# Patient Record
Sex: MALE | Race: WHITE | NOT HISPANIC OR LATINO
[De-identification: names, ages, dates, MRNs, and addresses within clinical notes are randomized per-mention and may not be internally consistent; named-entity substitution may affect disease eponyms.]

---

## 2017-01-02 ENCOUNTER — RESULT REVIEW (OUTPATIENT)
Age: 82
End: 2017-01-02

## 2017-01-03 ENCOUNTER — APPOINTMENT (OUTPATIENT)
Dept: HEMATOLOGY ONCOLOGY | Facility: CLINIC | Age: 82
End: 2017-01-03

## 2017-01-03 VITALS
SYSTOLIC BLOOD PRESSURE: 117 MMHG | DIASTOLIC BLOOD PRESSURE: 77 MMHG | HEART RATE: 61 BPM | RESPIRATION RATE: 16 BRPM | TEMPERATURE: 97.6 F | WEIGHT: 243.61 LBS | BODY MASS INDEX: 31.6 KG/M2 | OXYGEN SATURATION: 96 %

## 2017-02-14 ENCOUNTER — OUTPATIENT (OUTPATIENT)
Dept: OUTPATIENT SERVICES | Facility: HOSPITAL | Age: 82
LOS: 1 days | Discharge: HOME | End: 2017-02-14

## 2017-02-14 ENCOUNTER — RESULT REVIEW (OUTPATIENT)
Age: 82
End: 2017-02-14

## 2017-02-14 DIAGNOSIS — D59.5: ICD-10-CM

## 2017-02-15 ENCOUNTER — APPOINTMENT (OUTPATIENT)
Dept: HEMATOLOGY ONCOLOGY | Facility: CLINIC | Age: 82
End: 2017-02-15

## 2017-02-15 VITALS
RESPIRATION RATE: 16 BRPM | HEART RATE: 72 BPM | DIASTOLIC BLOOD PRESSURE: 70 MMHG | BODY MASS INDEX: 30.89 KG/M2 | TEMPERATURE: 97.8 F | SYSTOLIC BLOOD PRESSURE: 120 MMHG | WEIGHT: 238.1 LBS | OXYGEN SATURATION: 98 %

## 2017-02-15 DIAGNOSIS — G47.00 INSOMNIA, UNSPECIFIED: ICD-10-CM

## 2017-02-15 LAB
BASOPHILS # BLD AUTO: 0.1 K/UL — SIGNIFICANT CHANGE UP (ref 0–0.2)
BASOPHILS NFR BLD AUTO: 1.5 % — SIGNIFICANT CHANGE UP (ref 0–2)
EOSINOPHIL # BLD AUTO: 0.1 K/UL — SIGNIFICANT CHANGE UP (ref 0–0.5)
EOSINOPHIL NFR BLD AUTO: 4.4 % — SIGNIFICANT CHANGE UP (ref 0–6)
HCT VFR BLD CALC: 31 % — LOW (ref 39–50)
HGB BLD-MCNC: 10.4 G/DL — LOW (ref 13–17)
LYMPHOCYTES # BLD AUTO: 0.9 K/UL — LOW (ref 1–3.3)
LYMPHOCYTES # BLD AUTO: 27.6 % — SIGNIFICANT CHANGE UP (ref 13–44)
MCHC RBC-ENTMCNC: 27.8 PG — SIGNIFICANT CHANGE UP (ref 27–34)
MCHC RBC-ENTMCNC: 33.5 G/DL — SIGNIFICANT CHANGE UP (ref 32–36)
MCV RBC AUTO: 83 FL — SIGNIFICANT CHANGE UP (ref 80–100)
MONOCYTES # BLD AUTO: 0.4 K/UL — SIGNIFICANT CHANGE UP (ref 0–0.9)
MONOCYTES NFR BLD AUTO: 12 % — SIGNIFICANT CHANGE UP (ref 2–14)
NEUTROPHILS # BLD AUTO: 1.8 K/UL — SIGNIFICANT CHANGE UP (ref 1.8–7.4)
NEUTROPHILS NFR BLD AUTO: 54.5 % — SIGNIFICANT CHANGE UP (ref 43–77)
PLATELET # BLD AUTO: 160 K/UL — SIGNIFICANT CHANGE UP (ref 150–400)
RBC # BLD: 3.74 M/UL — LOW (ref 4.2–5.8)
RBC # FLD: 17.4 % — HIGH (ref 10.3–14.5)
WBC # BLD: 3.4 K/UL — LOW (ref 3.8–10.5)
WBC # FLD AUTO: 3.4 K/UL — LOW (ref 3.8–10.5)

## 2017-03-27 ENCOUNTER — RESULT REVIEW (OUTPATIENT)
Age: 82
End: 2017-03-27

## 2017-03-27 ENCOUNTER — OUTPATIENT (OUTPATIENT)
Dept: OUTPATIENT SERVICES | Facility: HOSPITAL | Age: 82
LOS: 1 days | Discharge: HOME | End: 2017-03-27

## 2017-03-27 DIAGNOSIS — D61.9 APLASTIC ANEMIA, UNSPECIFIED: ICD-10-CM

## 2017-03-27 DIAGNOSIS — D59.5: ICD-10-CM

## 2017-03-28 ENCOUNTER — APPOINTMENT (OUTPATIENT)
Dept: HEMATOLOGY ONCOLOGY | Facility: CLINIC | Age: 82
End: 2017-03-28

## 2017-03-28 VITALS
WEIGHT: 240.3 LBS | HEART RATE: 84 BPM | RESPIRATION RATE: 16 BRPM | DIASTOLIC BLOOD PRESSURE: 80 MMHG | OXYGEN SATURATION: 100 % | SYSTOLIC BLOOD PRESSURE: 120 MMHG | TEMPERATURE: 96.9 F | BODY MASS INDEX: 31.17 KG/M2

## 2017-03-28 LAB
BASOPHILS # BLD AUTO: 0 K/UL — SIGNIFICANT CHANGE UP (ref 0–0.2)
BASOPHILS NFR BLD AUTO: 0.8 % — SIGNIFICANT CHANGE UP (ref 0–2)
EOSINOPHIL # BLD AUTO: 0.2 K/UL — SIGNIFICANT CHANGE UP (ref 0–0.5)
EOSINOPHIL NFR BLD AUTO: 4.8 % — SIGNIFICANT CHANGE UP (ref 0–6)
HCT VFR BLD CALC: 32.3 % — LOW (ref 39–50)
HGB BLD-MCNC: 10.7 G/DL — LOW (ref 13–17)
LYMPHOCYTES # BLD AUTO: 1 K/UL — SIGNIFICANT CHANGE UP (ref 1–3.3)
LYMPHOCYTES # BLD AUTO: 28.6 % — SIGNIFICANT CHANGE UP (ref 13–44)
MCHC RBC-ENTMCNC: 26.5 PG — LOW (ref 27–34)
MCHC RBC-ENTMCNC: 33.1 G/DL — SIGNIFICANT CHANGE UP (ref 32–36)
MCV RBC AUTO: 80 FL — SIGNIFICANT CHANGE UP (ref 80–100)
MONOCYTES # BLD AUTO: 0.3 K/UL — SIGNIFICANT CHANGE UP (ref 0–0.9)
MONOCYTES NFR BLD AUTO: 9 % — SIGNIFICANT CHANGE UP (ref 2–14)
NEUTROPHILS # BLD AUTO: 2 K/UL — SIGNIFICANT CHANGE UP (ref 1.8–7.4)
NEUTROPHILS NFR BLD AUTO: 56.8 % — SIGNIFICANT CHANGE UP (ref 43–77)
PLATELET # BLD AUTO: 171 K/UL — SIGNIFICANT CHANGE UP (ref 150–400)
RBC # BLD: 4.03 M/UL — LOW (ref 4.2–5.8)
RBC # FLD: 17 % — HIGH (ref 10.3–14.5)
WBC # BLD: 3.6 K/UL — LOW (ref 3.8–10.5)
WBC # FLD AUTO: 3.6 K/UL — LOW (ref 3.8–10.5)

## 2017-05-08 ENCOUNTER — OUTPATIENT (OUTPATIENT)
Dept: OUTPATIENT SERVICES | Facility: HOSPITAL | Age: 82
LOS: 1 days | Discharge: HOME | End: 2017-05-08

## 2017-05-08 DIAGNOSIS — D59.5: ICD-10-CM

## 2017-05-09 ENCOUNTER — LABORATORY RESULT (OUTPATIENT)
Age: 82
End: 2017-05-09

## 2017-05-09 ENCOUNTER — APPOINTMENT (OUTPATIENT)
Dept: HEMATOLOGY ONCOLOGY | Facility: CLINIC | Age: 82
End: 2017-05-09

## 2017-05-09 ENCOUNTER — RESULT REVIEW (OUTPATIENT)
Age: 82
End: 2017-05-09

## 2017-05-09 VITALS
OXYGEN SATURATION: 99 % | DIASTOLIC BLOOD PRESSURE: 80 MMHG | RESPIRATION RATE: 16 BRPM | SYSTOLIC BLOOD PRESSURE: 120 MMHG | BODY MASS INDEX: 31.46 KG/M2 | TEMPERATURE: 98.5 F | WEIGHT: 242.49 LBS | HEART RATE: 88 BPM

## 2017-05-09 LAB
BASOPHILS # BLD AUTO: 0.1 K/UL — SIGNIFICANT CHANGE UP (ref 0–0.2)
BASOPHILS NFR BLD AUTO: 1.4 % — SIGNIFICANT CHANGE UP (ref 0–2)
EOSINOPHIL # BLD AUTO: 0.1 K/UL — SIGNIFICANT CHANGE UP (ref 0–0.5)
EOSINOPHIL NFR BLD AUTO: 2.7 % — SIGNIFICANT CHANGE UP (ref 0–6)
HCT VFR BLD CALC: 24.5 % — LOW (ref 39–50)
HGB BLD-MCNC: 8 G/DL — LOW (ref 13–17)
LYMPHOCYTES # BLD AUTO: 1.1 K/UL — SIGNIFICANT CHANGE UP (ref 1–3.3)
LYMPHOCYTES # BLD AUTO: 26.7 % — SIGNIFICANT CHANGE UP (ref 13–44)
MCHC RBC-ENTMCNC: 25.4 PG — LOW (ref 27–34)
MCHC RBC-ENTMCNC: 32.8 G/DL — SIGNIFICANT CHANGE UP (ref 32–36)
MCV RBC AUTO: 77.5 FL — LOW (ref 80–100)
MONOCYTES # BLD AUTO: 0.4 K/UL — SIGNIFICANT CHANGE UP (ref 0–0.9)
MONOCYTES NFR BLD AUTO: 10.3 % — SIGNIFICANT CHANGE UP (ref 2–14)
NEUTROPHILS # BLD AUTO: 2.3 K/UL — SIGNIFICANT CHANGE UP (ref 1.8–7.4)
NEUTROPHILS NFR BLD AUTO: 58.9 % — SIGNIFICANT CHANGE UP (ref 43–77)
PLATELET # BLD AUTO: 180 K/UL — SIGNIFICANT CHANGE UP (ref 150–400)
RBC # BLD: 3.16 M/UL — LOW (ref 4.2–5.8)
RBC # FLD: 16.9 % — HIGH (ref 10.3–14.5)
RETICS #: 58.6 K/UL — SIGNIFICANT CHANGE UP (ref 25–125)
RETICS/RBC NFR: 1.8 % — SIGNIFICANT CHANGE UP (ref 0.5–2.5)
WBC # BLD: 4 K/UL — SIGNIFICANT CHANGE UP (ref 3.8–10.5)
WBC # FLD AUTO: 4 K/UL — SIGNIFICANT CHANGE UP (ref 3.8–10.5)

## 2017-05-09 RX ORDER — DOCUSATE SODIUM 100 MG/1
100 CAPSULE ORAL TWICE DAILY
Refills: 0 | Status: ACTIVE | COMMUNITY
Start: 2017-05-09

## 2017-05-10 ENCOUNTER — RESULT REVIEW (OUTPATIENT)
Age: 82
End: 2017-05-10

## 2017-05-16 ENCOUNTER — APPOINTMENT (OUTPATIENT)
Dept: HEMATOLOGY ONCOLOGY | Facility: CLINIC | Age: 82
End: 2017-05-16

## 2017-05-16 ENCOUNTER — RESULT REVIEW (OUTPATIENT)
Age: 82
End: 2017-05-16

## 2017-05-16 ENCOUNTER — OUTPATIENT (OUTPATIENT)
Dept: OUTPATIENT SERVICES | Facility: HOSPITAL | Age: 82
LOS: 1 days | End: 2017-05-16
Payer: MEDICARE

## 2017-05-16 VITALS
BODY MASS INDEX: 30.97 KG/M2 | HEART RATE: 97 BPM | DIASTOLIC BLOOD PRESSURE: 69 MMHG | SYSTOLIC BLOOD PRESSURE: 110 MMHG | OXYGEN SATURATION: 97 % | TEMPERATURE: 98.1 F | RESPIRATION RATE: 16 BRPM | WEIGHT: 238.76 LBS

## 2017-05-16 DIAGNOSIS — D61.9 APLASTIC ANEMIA, UNSPECIFIED: ICD-10-CM

## 2017-05-16 DIAGNOSIS — D59.5: ICD-10-CM

## 2017-05-16 LAB
BASOPHILS # BLD AUTO: 0 K/UL — SIGNIFICANT CHANGE UP (ref 0–0.2)
BASOPHILS NFR BLD AUTO: 1.1 % — SIGNIFICANT CHANGE UP (ref 0–2)
EOSINOPHIL # BLD AUTO: 0.1 K/UL — SIGNIFICANT CHANGE UP (ref 0–0.5)
EOSINOPHIL NFR BLD AUTO: 1.4 % — SIGNIFICANT CHANGE UP (ref 0–6)
HCT VFR BLD CALC: 21.2 % — LOW (ref 39–50)
HGB BLD-MCNC: 7 G/DL — CRITICAL LOW (ref 13–17)
LYMPHOCYTES # BLD AUTO: 1 K/UL — SIGNIFICANT CHANGE UP (ref 1–3.3)
LYMPHOCYTES # BLD AUTO: 26.7 % — SIGNIFICANT CHANGE UP (ref 13–44)
MCHC RBC-ENTMCNC: 24.9 PG — LOW (ref 27–34)
MCHC RBC-ENTMCNC: 32.8 G/DL — SIGNIFICANT CHANGE UP (ref 32–36)
MCV RBC AUTO: 75.8 FL — LOW (ref 80–100)
MONOCYTES # BLD AUTO: 0.3 K/UL — SIGNIFICANT CHANGE UP (ref 0–0.9)
MONOCYTES NFR BLD AUTO: 9.2 % — SIGNIFICANT CHANGE UP (ref 2–14)
NEUTROPHILS # BLD AUTO: 2.3 K/UL — SIGNIFICANT CHANGE UP (ref 1.8–7.4)
NEUTROPHILS NFR BLD AUTO: 61.5 % — SIGNIFICANT CHANGE UP (ref 43–77)
OB PNL STL: POSITIVE
OB PNL STL: POSITIVE
PLATELET # BLD AUTO: 211 K/UL — SIGNIFICANT CHANGE UP (ref 150–400)
RBC # BLD: 2.79 M/UL — LOW (ref 4.2–5.8)
RBC # FLD: 16.9 % — HIGH (ref 10.3–14.5)
WBC # BLD: 3.7 K/UL — LOW (ref 3.8–10.5)
WBC # FLD AUTO: 3.7 K/UL — LOW (ref 3.8–10.5)

## 2017-05-19 ENCOUNTER — APPOINTMENT (OUTPATIENT)
Dept: INFUSION THERAPY | Facility: HOSPITAL | Age: 82
End: 2017-05-19

## 2017-05-22 DIAGNOSIS — Z51.89 ENCOUNTER FOR OTHER SPECIFIED AFTERCARE: ICD-10-CM

## 2017-06-02 ENCOUNTER — APPOINTMENT (OUTPATIENT)
Dept: HEMATOLOGY ONCOLOGY | Facility: CLINIC | Age: 82
End: 2017-06-02

## 2017-06-02 ENCOUNTER — RESULT REVIEW (OUTPATIENT)
Age: 82
End: 2017-06-02

## 2017-06-02 LAB
BASOPHILS # BLD AUTO: 0 K/UL — SIGNIFICANT CHANGE UP (ref 0–0.2)
BASOPHILS NFR BLD AUTO: 0.6 % — SIGNIFICANT CHANGE UP (ref 0–2)
BLD GP AB SCN SERPL QL: NEGATIVE — SIGNIFICANT CHANGE UP
EOSINOPHIL # BLD AUTO: 0.1 K/UL — SIGNIFICANT CHANGE UP (ref 0–0.5)
EOSINOPHIL NFR BLD AUTO: 1.8 % — SIGNIFICANT CHANGE UP (ref 0–6)
HCT VFR BLD CALC: 25.3 % — LOW (ref 39–50)
HGB BLD-MCNC: 8 G/DL — LOW (ref 13–17)
LYMPHOCYTES # BLD AUTO: 1 K/UL — SIGNIFICANT CHANGE UP (ref 1–3.3)
LYMPHOCYTES # BLD AUTO: 31.3 % — SIGNIFICANT CHANGE UP (ref 13–44)
MCHC RBC-ENTMCNC: 24.4 PG — LOW (ref 27–34)
MCHC RBC-ENTMCNC: 31.7 G/DL — LOW (ref 32–36)
MCV RBC AUTO: 77.1 FL — LOW (ref 80–100)
MONOCYTES # BLD AUTO: 0.3 K/UL — SIGNIFICANT CHANGE UP (ref 0–0.9)
MONOCYTES NFR BLD AUTO: 9.5 % — SIGNIFICANT CHANGE UP (ref 2–14)
NEUTROPHILS # BLD AUTO: 1.7 K/UL — LOW (ref 1.8–7.4)
NEUTROPHILS NFR BLD AUTO: 56.8 % — SIGNIFICANT CHANGE UP (ref 43–77)
PLATELET # BLD AUTO: 141 K/UL — LOW (ref 150–400)
RBC # BLD: 3.28 M/UL — LOW (ref 4.2–5.8)
RBC # FLD: 17.7 % — HIGH (ref 10.3–14.5)
RH IG SCN BLD-IMP: POSITIVE — SIGNIFICANT CHANGE UP
WBC # BLD: 3.1 K/UL — LOW (ref 3.8–10.5)
WBC # FLD AUTO: 3.1 K/UL — LOW (ref 3.8–10.5)

## 2017-06-02 PROCEDURE — 86923 COMPATIBILITY TEST ELECTRIC: CPT

## 2017-06-02 PROCEDURE — 86900 BLOOD TYPING SEROLOGIC ABO: CPT

## 2017-06-02 PROCEDURE — 86850 RBC ANTIBODY SCREEN: CPT

## 2017-06-02 PROCEDURE — 86901 BLOOD TYPING SEROLOGIC RH(D): CPT

## 2017-06-03 ENCOUNTER — APPOINTMENT (OUTPATIENT)
Dept: INFUSION THERAPY | Facility: HOSPITAL | Age: 82
End: 2017-06-03

## 2017-06-09 ENCOUNTER — OUTPATIENT (OUTPATIENT)
Dept: OUTPATIENT SERVICES | Facility: HOSPITAL | Age: 82
LOS: 1 days | Discharge: HOME | End: 2017-06-09

## 2017-06-09 DIAGNOSIS — D61.9 APLASTIC ANEMIA, UNSPECIFIED: ICD-10-CM

## 2017-06-09 DIAGNOSIS — D59.5: ICD-10-CM

## 2017-06-12 ENCOUNTER — RESULT REVIEW (OUTPATIENT)
Age: 82
End: 2017-06-12

## 2017-06-12 ENCOUNTER — APPOINTMENT (OUTPATIENT)
Dept: HEMATOLOGY ONCOLOGY | Facility: CLINIC | Age: 82
End: 2017-06-12

## 2017-06-12 VITALS
WEIGHT: 240.3 LBS | OXYGEN SATURATION: 98 % | TEMPERATURE: 97.3 F | HEART RATE: 71 BPM | SYSTOLIC BLOOD PRESSURE: 114 MMHG | RESPIRATION RATE: 16 BRPM | DIASTOLIC BLOOD PRESSURE: 70 MMHG | BODY MASS INDEX: 31.17 KG/M2

## 2017-06-12 LAB
ANISOCYTOSIS BLD QL: SLIGHT — SIGNIFICANT CHANGE UP
BASOPHILS # BLD AUTO: 0 K/UL — SIGNIFICANT CHANGE UP (ref 0–0.2)
BASOPHILS NFR BLD AUTO: 1 % — SIGNIFICANT CHANGE UP (ref 0–2)
ELLIPTOCYTES BLD QL SMEAR: SLIGHT — SIGNIFICANT CHANGE UP
EOSINOPHIL # BLD AUTO: 0.1 K/UL — SIGNIFICANT CHANGE UP (ref 0–0.5)
EOSINOPHIL NFR BLD AUTO: 4.7 % — SIGNIFICANT CHANGE UP (ref 0–6)
HCT VFR BLD CALC: 26.8 % — LOW (ref 39–50)
HGB BLD-MCNC: 8.9 G/DL — LOW (ref 13–17)
HYPOCHROMIA BLD QL: SLIGHT — SIGNIFICANT CHANGE UP
LYMPHOCYTES # BLD AUTO: 1 K/UL — SIGNIFICANT CHANGE UP (ref 1–3.3)
LYMPHOCYTES # BLD AUTO: 35.9 % — SIGNIFICANT CHANGE UP (ref 13–44)
MCHC RBC-ENTMCNC: 25.2 PG — LOW (ref 27–34)
MCHC RBC-ENTMCNC: 33.2 G/DL — SIGNIFICANT CHANGE UP (ref 32–36)
MCV RBC AUTO: 75.9 FL — LOW (ref 80–100)
MONOCYTES # BLD AUTO: 0.3 K/UL — SIGNIFICANT CHANGE UP (ref 0–0.9)
MONOCYTES NFR BLD AUTO: 10.3 % — SIGNIFICANT CHANGE UP (ref 2–14)
NEUTROPHILS # BLD AUTO: 1.4 K/UL — LOW (ref 1.8–7.4)
NEUTROPHILS NFR BLD AUTO: 48.1 % — SIGNIFICANT CHANGE UP (ref 43–77)
PLAT MORPH BLD: NORMAL — SIGNIFICANT CHANGE UP
PLATELET # BLD AUTO: 162 K/UL — SIGNIFICANT CHANGE UP (ref 150–400)
POIKILOCYTOSIS BLD QL AUTO: SLIGHT — SIGNIFICANT CHANGE UP
RBC # BLD: 3.53 M/UL — LOW (ref 4.2–5.8)
RBC # FLD: 18.8 % — HIGH (ref 10.3–14.5)
RBC BLD AUTO: ABNORMAL
TARGETS BLD QL SMEAR: SLIGHT — SIGNIFICANT CHANGE UP
WBC # BLD: 2.9 K/UL — LOW (ref 3.8–10.5)
WBC # FLD AUTO: 2.9 K/UL — LOW (ref 3.8–10.5)

## 2017-06-20 ENCOUNTER — RESULT REVIEW (OUTPATIENT)
Age: 82
End: 2017-06-20

## 2017-06-20 ENCOUNTER — APPOINTMENT (OUTPATIENT)
Dept: HEMATOLOGY ONCOLOGY | Facility: CLINIC | Age: 82
End: 2017-06-20

## 2017-06-20 LAB
BASOPHILS # BLD AUTO: 0 K/UL — SIGNIFICANT CHANGE UP (ref 0–0.2)
BASOPHILS NFR BLD AUTO: 1.4 % — SIGNIFICANT CHANGE UP (ref 0–2)
EOSINOPHIL # BLD AUTO: 0.1 K/UL — SIGNIFICANT CHANGE UP (ref 0–0.5)
EOSINOPHIL NFR BLD AUTO: 3 % — SIGNIFICANT CHANGE UP (ref 0–6)
HCT VFR BLD CALC: 26.3 % — LOW (ref 39–50)
HGB BLD-MCNC: 8.7 G/DL — LOW (ref 13–17)
LYMPHOCYTES # BLD AUTO: 0.8 K/UL — LOW (ref 1–3.3)
LYMPHOCYTES # BLD AUTO: 28.6 % — SIGNIFICANT CHANGE UP (ref 13–44)
MCHC RBC-ENTMCNC: 24.6 PG — LOW (ref 27–34)
MCHC RBC-ENTMCNC: 33 G/DL — SIGNIFICANT CHANGE UP (ref 32–36)
MCV RBC AUTO: 74.5 FL — LOW (ref 80–100)
MONOCYTES # BLD AUTO: 0.3 K/UL — SIGNIFICANT CHANGE UP (ref 0–0.9)
MONOCYTES NFR BLD AUTO: 11.4 % — SIGNIFICANT CHANGE UP (ref 2–14)
NEUTROPHILS # BLD AUTO: 1.6 K/UL — LOW (ref 1.8–7.4)
NEUTROPHILS NFR BLD AUTO: 55.5 % — SIGNIFICANT CHANGE UP (ref 43–77)
PLATELET # BLD AUTO: 160 K/UL — SIGNIFICANT CHANGE UP (ref 150–400)
RBC # BLD: 3.52 M/UL — LOW (ref 4.2–5.8)
RBC # FLD: 19 % — HIGH (ref 10.3–14.5)
WBC # BLD: 2.8 K/UL — LOW (ref 3.8–10.5)
WBC # FLD AUTO: 2.8 K/UL — LOW (ref 3.8–10.5)

## 2017-06-27 ENCOUNTER — APPOINTMENT (OUTPATIENT)
Dept: HEMATOLOGY ONCOLOGY | Facility: CLINIC | Age: 82
End: 2017-06-27

## 2017-06-27 ENCOUNTER — RESULT REVIEW (OUTPATIENT)
Age: 82
End: 2017-06-27

## 2017-06-27 LAB
BASOPHILS # BLD AUTO: 0 K/UL — SIGNIFICANT CHANGE UP (ref 0–0.2)
BASOPHILS NFR BLD AUTO: 1.5 % — SIGNIFICANT CHANGE UP (ref 0–2)
EOSINOPHIL # BLD AUTO: 0.1 K/UL — SIGNIFICANT CHANGE UP (ref 0–0.5)
EOSINOPHIL NFR BLD AUTO: 3.3 % — SIGNIFICANT CHANGE UP (ref 0–6)
HCT VFR BLD CALC: 27.3 % — LOW (ref 39–50)
HGB BLD-MCNC: 8.6 G/DL — LOW (ref 13–17)
LYMPHOCYTES # BLD AUTO: 0.8 K/UL — LOW (ref 1–3.3)
LYMPHOCYTES # BLD AUTO: 28.3 % — SIGNIFICANT CHANGE UP (ref 13–44)
MCHC RBC-ENTMCNC: 23.7 PG — LOW (ref 27–34)
MCHC RBC-ENTMCNC: 31.5 G/DL — LOW (ref 32–36)
MCV RBC AUTO: 75.1 FL — LOW (ref 80–100)
MONOCYTES # BLD AUTO: 0.3 K/UL — SIGNIFICANT CHANGE UP (ref 0–0.9)
MONOCYTES NFR BLD AUTO: 11.6 % — SIGNIFICANT CHANGE UP (ref 2–14)
NEUTROPHILS # BLD AUTO: 1.6 K/UL — LOW (ref 1.8–7.4)
NEUTROPHILS NFR BLD AUTO: 55.3 % — SIGNIFICANT CHANGE UP (ref 43–77)
PLATELET # BLD AUTO: 182 K/UL — SIGNIFICANT CHANGE UP (ref 150–400)
RBC # BLD: 3.63 M/UL — LOW (ref 4.2–5.8)
RBC # FLD: 19.3 % — HIGH (ref 10.3–14.5)
WBC # BLD: 3 K/UL — LOW (ref 3.8–10.5)
WBC # FLD AUTO: 3 K/UL — LOW (ref 3.8–10.5)

## 2017-07-18 ENCOUNTER — APPOINTMENT (OUTPATIENT)
Dept: HEMATOLOGY ONCOLOGY | Facility: CLINIC | Age: 82
End: 2017-07-18

## 2017-07-18 ENCOUNTER — OUTPATIENT (OUTPATIENT)
Dept: OUTPATIENT SERVICES | Facility: HOSPITAL | Age: 82
LOS: 1 days | Discharge: HOME | End: 2017-07-18

## 2017-07-18 ENCOUNTER — RESULT REVIEW (OUTPATIENT)
Age: 82
End: 2017-07-18

## 2017-07-18 DIAGNOSIS — D61.9 APLASTIC ANEMIA, UNSPECIFIED: ICD-10-CM

## 2017-07-18 DIAGNOSIS — D59.5: ICD-10-CM

## 2017-07-18 LAB
BASOPHILS # BLD AUTO: 0 K/UL — SIGNIFICANT CHANGE UP (ref 0–0.2)
BASOPHILS NFR BLD AUTO: 0.8 % — SIGNIFICANT CHANGE UP (ref 0–2)
EOSINOPHIL # BLD AUTO: 0.2 K/UL — SIGNIFICANT CHANGE UP (ref 0–0.5)
EOSINOPHIL NFR BLD AUTO: 3.4 % — SIGNIFICANT CHANGE UP (ref 0–6)
HCT VFR BLD CALC: 29 % — LOW (ref 39–50)
HGB BLD-MCNC: 9.6 G/DL — LOW (ref 13–17)
LYMPHOCYTES # BLD AUTO: 0.9 K/UL — LOW (ref 1–3.3)
LYMPHOCYTES # BLD AUTO: 20.2 % — SIGNIFICANT CHANGE UP (ref 13–44)
MCHC RBC-ENTMCNC: 24.2 PG — LOW (ref 27–34)
MCHC RBC-ENTMCNC: 33.1 G/DL — SIGNIFICANT CHANGE UP (ref 32–36)
MCV RBC AUTO: 73.3 FL — LOW (ref 80–100)
MONOCYTES # BLD AUTO: 0.5 K/UL — SIGNIFICANT CHANGE UP (ref 0–0.9)
MONOCYTES NFR BLD AUTO: 9.9 % — SIGNIFICANT CHANGE UP (ref 2–14)
NEUTROPHILS # BLD AUTO: 3 K/UL — SIGNIFICANT CHANGE UP (ref 1.8–7.4)
NEUTROPHILS NFR BLD AUTO: 65.7 % — SIGNIFICANT CHANGE UP (ref 43–77)
PLATELET # BLD AUTO: 214 K/UL — SIGNIFICANT CHANGE UP (ref 150–400)
RBC # BLD: 3.96 M/UL — LOW (ref 4.2–5.8)
RBC # FLD: 22.2 % — HIGH (ref 10.3–14.5)
WBC # BLD: 4.6 K/UL — SIGNIFICANT CHANGE UP (ref 3.8–10.5)
WBC # FLD AUTO: 4.6 K/UL — SIGNIFICANT CHANGE UP (ref 3.8–10.5)

## 2017-07-18 RX ORDER — POLYETHYLENE GLYCOL 3350, SODIUM SULFATE, SODIUM CHLORIDE, POTASSIUM CHLORIDE, ASCORBIC ACID, SODIUM ASCORBATE 7.5-2.691G
100 KIT ORAL
Qty: 1 | Refills: 0 | Status: DISCONTINUED | COMMUNITY
Start: 2017-06-15 | End: 2017-07-18

## 2017-08-03 ENCOUNTER — APPOINTMENT (OUTPATIENT)
Dept: CT IMAGING | Facility: IMAGING CENTER | Age: 82
End: 2017-08-03
Payer: MEDICARE

## 2017-08-03 ENCOUNTER — OUTPATIENT (OUTPATIENT)
Dept: OUTPATIENT SERVICES | Facility: HOSPITAL | Age: 82
LOS: 1 days | End: 2017-08-03
Payer: MEDICARE

## 2017-08-03 DIAGNOSIS — D59.5: ICD-10-CM

## 2017-08-03 DIAGNOSIS — D61.9 APLASTIC ANEMIA, UNSPECIFIED: ICD-10-CM

## 2017-08-03 PROCEDURE — 74176 CT ABD & PELVIS W/O CONTRAST: CPT

## 2017-08-03 PROCEDURE — 74176 CT ABD & PELVIS W/O CONTRAST: CPT | Mod: 26

## 2017-08-04 ENCOUNTER — APPOINTMENT (OUTPATIENT)
Dept: COLORECTAL SURGERY | Facility: CLINIC | Age: 82
End: 2017-08-04
Payer: MEDICARE

## 2017-08-04 VITALS
BODY MASS INDEX: 29.84 KG/M2 | RESPIRATION RATE: 14 BRPM | HEIGHT: 75 IN | WEIGHT: 240 LBS | HEART RATE: 76 BPM | DIASTOLIC BLOOD PRESSURE: 80 MMHG | SYSTOLIC BLOOD PRESSURE: 120 MMHG

## 2017-08-04 PROCEDURE — 99204 OFFICE O/P NEW MOD 45 MIN: CPT

## 2017-08-04 RX ORDER — AMOXICILLIN AND CLAVULANATE POTASSIUM 875; 125 MG/1; MG/1
875-125 TABLET, COATED ORAL
Qty: 20 | Refills: 0 | Status: DISCONTINUED | COMMUNITY
Start: 2017-05-22

## 2017-08-08 ENCOUNTER — RESULT REVIEW (OUTPATIENT)
Age: 82
End: 2017-08-08

## 2017-08-08 ENCOUNTER — APPOINTMENT (OUTPATIENT)
Dept: HEMATOLOGY ONCOLOGY | Facility: CLINIC | Age: 82
End: 2017-08-08
Payer: MEDICARE

## 2017-08-08 VITALS
OXYGEN SATURATION: 96 % | SYSTOLIC BLOOD PRESSURE: 120 MMHG | HEART RATE: 78 BPM | WEIGHT: 242.49 LBS | BODY MASS INDEX: 30.31 KG/M2 | TEMPERATURE: 98.2 F | DIASTOLIC BLOOD PRESSURE: 70 MMHG | RESPIRATION RATE: 16 BRPM

## 2017-08-08 DIAGNOSIS — K63.9 DISEASE OF INTESTINE, UNSPECIFIED: ICD-10-CM

## 2017-08-08 LAB
BASOPHILS # BLD AUTO: 0 K/UL — SIGNIFICANT CHANGE UP (ref 0–0.2)
BASOPHILS NFR BLD AUTO: 0.9 % — SIGNIFICANT CHANGE UP (ref 0–2)
EOSINOPHIL # BLD AUTO: 0.1 K/UL — SIGNIFICANT CHANGE UP (ref 0–0.5)
EOSINOPHIL NFR BLD AUTO: 2.4 % — SIGNIFICANT CHANGE UP (ref 0–6)
HCT VFR BLD CALC: 38.2 % — LOW (ref 39–50)
HGB BLD-MCNC: 11.7 G/DL — LOW (ref 13–17)
LYMPHOCYTES # BLD AUTO: 0.7 K/UL — LOW (ref 1–3.3)
LYMPHOCYTES # BLD AUTO: 16.9 % — SIGNIFICANT CHANGE UP (ref 13–44)
MCHC RBC-ENTMCNC: 25.2 PG — LOW (ref 27–34)
MCHC RBC-ENTMCNC: 30.7 G/DL — LOW (ref 32–36)
MCV RBC AUTO: 82.2 FL — SIGNIFICANT CHANGE UP (ref 80–100)
MONOCYTES # BLD AUTO: 0.5 K/UL — SIGNIFICANT CHANGE UP (ref 0–0.9)
MONOCYTES NFR BLD AUTO: 11.2 % — SIGNIFICANT CHANGE UP (ref 2–14)
NEUTROPHILS # BLD AUTO: 3 K/UL — SIGNIFICANT CHANGE UP (ref 1.8–7.4)
NEUTROPHILS NFR BLD AUTO: 68.6 % — SIGNIFICANT CHANGE UP (ref 43–77)
PLATELET # BLD AUTO: 220 K/UL — SIGNIFICANT CHANGE UP (ref 150–400)
RBC # BLD: 4.64 M/UL — SIGNIFICANT CHANGE UP (ref 4.2–5.8)
RBC # FLD: 28.4 % — HIGH (ref 10.3–14.5)
WBC # BLD: 4.4 K/UL — SIGNIFICANT CHANGE UP (ref 3.8–10.5)
WBC # FLD AUTO: 4.4 K/UL — SIGNIFICANT CHANGE UP (ref 3.8–10.5)

## 2017-08-08 PROCEDURE — 99214 OFFICE O/P EST MOD 30 MIN: CPT

## 2017-08-10 ENCOUNTER — OTHER (OUTPATIENT)
Age: 82
End: 2017-08-10

## 2017-08-10 LAB
ALBUMIN SERPL ELPH-MCNC: 4.1 G/DL
ALP BLD-CCNC: 94 U/L
ALT SERPL-CCNC: 16 U/L
ANION GAP SERPL CALC-SCNC: 14 MMOL/L
APTT BLD: 31.2 SEC
AST SERPL-CCNC: 27 U/L
BILIRUB SERPL-MCNC: 0.6 MG/DL
BUN SERPL-MCNC: 28 MG/DL
CALCIUM SERPL-MCNC: 8.9 MG/DL
CEA SERPL-MCNC: 3.1 NG/ML
CHLORIDE SERPL-SCNC: 103 MMOL/L
CO2 SERPL-SCNC: 28 MMOL/L
CREAT SERPL-MCNC: 1.47 MG/DL
GLUCOSE SERPL-MCNC: 126 MG/DL
INR PPP: 0.99 RATIO
LDH SERPL-CCNC: 239 U/L
POTASSIUM SERPL-SCNC: 4.7 MMOL/L
PROT SERPL-MCNC: 7.3 G/DL
PT BLD: 11.2 SEC
SODIUM SERPL-SCNC: 145 MMOL/L

## 2017-08-14 ENCOUNTER — APPOINTMENT (OUTPATIENT)
Dept: HEMATOLOGY ONCOLOGY | Facility: CLINIC | Age: 82
End: 2017-08-14

## 2017-08-24 ENCOUNTER — OUTPATIENT (OUTPATIENT)
Dept: OUTPATIENT SERVICES | Facility: HOSPITAL | Age: 82
LOS: 1 days | End: 2017-08-24
Payer: MEDICARE

## 2017-08-24 VITALS
RESPIRATION RATE: 15 BRPM | HEIGHT: 75 IN | TEMPERATURE: 99 F | SYSTOLIC BLOOD PRESSURE: 132 MMHG | WEIGHT: 238.98 LBS | DIASTOLIC BLOOD PRESSURE: 85 MMHG | HEART RATE: 102 BPM | OXYGEN SATURATION: 97 %

## 2017-08-24 DIAGNOSIS — K63.9 DISEASE OF INTESTINE, UNSPECIFIED: ICD-10-CM

## 2017-08-24 DIAGNOSIS — Z98.890 OTHER SPECIFIED POSTPROCEDURAL STATES: Chronic | ICD-10-CM

## 2017-08-24 DIAGNOSIS — I42.9 CARDIOMYOPATHY, UNSPECIFIED: ICD-10-CM

## 2017-08-24 DIAGNOSIS — D61.9 APLASTIC ANEMIA, UNSPECIFIED: ICD-10-CM

## 2017-08-24 DIAGNOSIS — Z95.810 PRESENCE OF AUTOMATIC (IMPLANTABLE) CARDIAC DEFIBRILLATOR: Chronic | ICD-10-CM

## 2017-08-24 DIAGNOSIS — Z01.818 ENCOUNTER FOR OTHER PREPROCEDURAL EXAMINATION: ICD-10-CM

## 2017-08-24 LAB
BLD GP AB SCN SERPL QL: NEGATIVE — SIGNIFICANT CHANGE UP
HBA1C BLD-MCNC: 5.1 % — SIGNIFICANT CHANGE UP (ref 4–5.6)
RH IG SCN BLD-IMP: POSITIVE — SIGNIFICANT CHANGE UP

## 2017-08-24 PROCEDURE — 83036 HEMOGLOBIN GLYCOSYLATED A1C: CPT

## 2017-08-24 PROCEDURE — G0463: CPT

## 2017-08-24 PROCEDURE — 86901 BLOOD TYPING SEROLOGIC RH(D): CPT

## 2017-08-24 PROCEDURE — 86900 BLOOD TYPING SEROLOGIC ABO: CPT

## 2017-08-24 PROCEDURE — 86850 RBC ANTIBODY SCREEN: CPT

## 2017-08-24 NOTE — H&P PST ADULT - NEGATIVE GENERAL GENITOURINARY SYMPTOMS
no dysuria/no incontinence/no bladder infections/no flank pain L/no flank pain R/no hematuria/no renal colic

## 2017-08-24 NOTE — H&P PST ADULT - HISTORY OF PRESENT ILLNESS
83 y/o male with pmhx of CAD s/p PCI with GREYSON x 3 in 2008, cardiomyopathy last EF 20-25%, chronic afib - no AC, severe aplastic anemia with thrombocytopenia, 81 y/o male with pmhx of CAD s/p PCI with GREYSON x 3 in 2008, cardiomyopathy last EF 20-25% s/p ICD placement in 2014, fall in 2014 with subdural hematoma s/p evacuation at [facility], chronic afib - no coumadin 2/2 severe aplastic anemia with thrombocytopenia, (recent plts 220). Pt states his H&H began to decline with stable plts - was sent for colonoscopy/capsule endoscopy with + small bowel lesion suspicious for malignancy. Pt states he has been feeling well, no unintentional weight loss, fever/chills, recent infections, denies abdominal pain/melena/hematochezia. Presents for exploratory laparotomy, small bowel resection.

## 2017-08-24 NOTE — H&P PST ADULT - NSANTHNECKRD_ENT_A_CORE
R eye vision impairment/Partially impaired: cannot see medication labels or newsprint, but can see obstacles in path, and the surrounding layout; can count fingers at arm's length No

## 2017-08-24 NOTE — H&P PST ADULT - PROBLEM SELECTOR PLAN 2
was seem by cardiology for preop eval - pt is to stay on aspirin preop  last echo from 11/2016 and ICD reports requested  EP notified of AICD

## 2017-08-24 NOTE — H&P PST ADULT - NSANTHOSAYNRD_GEN_A_CORE
No. MAYNOR screening performed.  STOP BANG Legend: 0-2 = LOW Risk; 3-4 = INTERMEDIATE Risk; 5-8 = HIGH Risk/16.5 in

## 2017-08-24 NOTE — H&P PST ADULT - PMH
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

## 2017-08-24 NOTE — H&P PST ADULT - PROBLEM SELECTOR PLAN 1
Scheduled exploratory laparotomy, small bowel resection  T&S, A1c ordered; coags, CBC and CMP results in chart  STAT FS for DOS ordered

## 2017-09-05 ENCOUNTER — TRANSCRIPTION ENCOUNTER (OUTPATIENT)
Age: 82
End: 2017-09-05

## 2017-09-05 ENCOUNTER — INPATIENT (INPATIENT)
Facility: HOSPITAL | Age: 82
LOS: 2 days | Discharge: HOME | DRG: 330 | End: 2017-09-08
Attending: SURGERY | Admitting: SURGERY
Payer: MEDICARE

## 2017-09-05 ENCOUNTER — APPOINTMENT (OUTPATIENT)
Dept: COLORECTAL SURGERY | Facility: HOSPITAL | Age: 82
End: 2017-09-05
Payer: MEDICARE

## 2017-09-05 ENCOUNTER — RESULT REVIEW (OUTPATIENT)
Age: 82
End: 2017-09-05

## 2017-09-05 VITALS
TEMPERATURE: 98 F | WEIGHT: 238.98 LBS | OXYGEN SATURATION: 97 % | SYSTOLIC BLOOD PRESSURE: 131 MMHG | HEIGHT: 75 IN | HEART RATE: 110 BPM | DIASTOLIC BLOOD PRESSURE: 86 MMHG | RESPIRATION RATE: 18 BRPM

## 2017-09-05 DIAGNOSIS — K63.9 DISEASE OF INTESTINE, UNSPECIFIED: ICD-10-CM

## 2017-09-05 DIAGNOSIS — Z98.890 OTHER SPECIFIED POSTPROCEDURAL STATES: Chronic | ICD-10-CM

## 2017-09-05 DIAGNOSIS — Z95.810 PRESENCE OF AUTOMATIC (IMPLANTABLE) CARDIAC DEFIBRILLATOR: Chronic | ICD-10-CM

## 2017-09-05 LAB
BLD GP AB SCN SERPL QL: NEGATIVE — SIGNIFICANT CHANGE UP
GAS PNL BLDA: SIGNIFICANT CHANGE UP
RH IG SCN BLD-IMP: POSITIVE — SIGNIFICANT CHANGE UP

## 2017-09-05 PROCEDURE — 49320 DIAG LAPARO SEPARATE PROC: CPT | Mod: 59

## 2017-09-05 PROCEDURE — 44120 REMOVAL OF SMALL INTESTINE: CPT

## 2017-09-05 RX ORDER — ACETAMINOPHEN 500 MG
1000 TABLET ORAL ONCE
Qty: 0 | Refills: 0 | Status: COMPLETED | OUTPATIENT
Start: 2017-09-06 | End: 2017-09-06

## 2017-09-05 RX ORDER — HEPARIN SODIUM 5000 [USP'U]/ML
5000 INJECTION INTRAVENOUS; SUBCUTANEOUS ONCE
Qty: 0 | Refills: 0 | Status: COMPLETED | OUTPATIENT
Start: 2017-09-05 | End: 2017-09-05

## 2017-09-05 RX ORDER — METOPROLOL TARTRATE 50 MG
5 TABLET ORAL EVERY 6 HOURS
Qty: 0 | Refills: 0 | Status: DISCONTINUED | OUTPATIENT
Start: 2017-09-05 | End: 2017-09-07

## 2017-09-05 RX ORDER — HYDROMORPHONE HYDROCHLORIDE 2 MG/ML
30 INJECTION INTRAMUSCULAR; INTRAVENOUS; SUBCUTANEOUS
Qty: 0 | Refills: 0 | Status: DISCONTINUED | OUTPATIENT
Start: 2017-09-05 | End: 2017-09-07

## 2017-09-05 RX ORDER — ONDANSETRON 8 MG/1
4 TABLET, FILM COATED ORAL EVERY 6 HOURS
Qty: 0 | Refills: 0 | Status: DISCONTINUED | OUTPATIENT
Start: 2017-09-05 | End: 2017-09-08

## 2017-09-05 RX ORDER — HYDROMORPHONE HYDROCHLORIDE 2 MG/ML
0.25 INJECTION INTRAMUSCULAR; INTRAVENOUS; SUBCUTANEOUS
Qty: 0 | Refills: 0 | Status: DISCONTINUED | OUTPATIENT
Start: 2017-09-05 | End: 2017-09-05

## 2017-09-05 RX ORDER — CEFOTETAN DISODIUM 1 G
2 VIAL (EA) INJECTION ONCE
Qty: 0 | Refills: 0 | Status: DISCONTINUED | OUTPATIENT
Start: 2017-09-05 | End: 2017-09-06

## 2017-09-05 RX ORDER — ACETAMINOPHEN 500 MG
1000 TABLET ORAL ONCE
Qty: 0 | Refills: 0 | Status: COMPLETED | OUTPATIENT
Start: 2017-09-05 | End: 2017-09-05

## 2017-09-05 RX ORDER — NALOXONE HYDROCHLORIDE 4 MG/.1ML
0.1 SPRAY NASAL
Qty: 0 | Refills: 0 | Status: DISCONTINUED | OUTPATIENT
Start: 2017-09-05 | End: 2017-09-08

## 2017-09-05 RX ORDER — ONDANSETRON 8 MG/1
4 TABLET, FILM COATED ORAL ONCE
Qty: 0 | Refills: 0 | Status: DISCONTINUED | OUTPATIENT
Start: 2017-09-05 | End: 2017-09-05

## 2017-09-05 RX ORDER — ELTROMBOPAG OLAMINE 50 MG/1
150 TABLET, FILM COATED ORAL AT BEDTIME
Qty: 0 | Refills: 0 | Status: DISCONTINUED | OUTPATIENT
Start: 2017-09-05 | End: 2017-09-08

## 2017-09-05 RX ORDER — HYDROMORPHONE HYDROCHLORIDE 2 MG/ML
0.25 INJECTION INTRAMUSCULAR; INTRAVENOUS; SUBCUTANEOUS
Qty: 0 | Refills: 0 | Status: DISCONTINUED | OUTPATIENT
Start: 2017-09-05 | End: 2017-09-07

## 2017-09-05 RX ORDER — PANTOPRAZOLE SODIUM 20 MG/1
40 TABLET, DELAYED RELEASE ORAL DAILY
Qty: 0 | Refills: 0 | Status: DISCONTINUED | OUTPATIENT
Start: 2017-09-05 | End: 2017-09-08

## 2017-09-05 RX ORDER — SODIUM CHLORIDE 9 MG/ML
3 INJECTION INTRAMUSCULAR; INTRAVENOUS; SUBCUTANEOUS EVERY 8 HOURS
Qty: 0 | Refills: 0 | Status: DISCONTINUED | OUTPATIENT
Start: 2017-09-05 | End: 2017-09-05

## 2017-09-05 RX ORDER — HEPARIN SODIUM 5000 [USP'U]/ML
5000 INJECTION INTRAVENOUS; SUBCUTANEOUS EVERY 8 HOURS
Qty: 0 | Refills: 0 | Status: DISCONTINUED | OUTPATIENT
Start: 2017-09-06 | End: 2017-09-08

## 2017-09-05 RX ORDER — SODIUM CHLORIDE 9 MG/ML
1000 INJECTION, SOLUTION INTRAVENOUS
Qty: 0 | Refills: 0 | Status: DISCONTINUED | OUTPATIENT
Start: 2017-09-05 | End: 2017-09-06

## 2017-09-05 RX ADMIN — ELTROMBOPAG OLAMINE 150 MILLIGRAM(S): 50 TABLET, FILM COATED ORAL at 21:20

## 2017-09-05 RX ADMIN — SODIUM CHLORIDE 75 MILLILITER(S): 9 INJECTION, SOLUTION INTRAVENOUS at 18:38

## 2017-09-05 RX ADMIN — HYDROMORPHONE HYDROCHLORIDE 0.25 MILLIGRAM(S): 2 INJECTION INTRAMUSCULAR; INTRAVENOUS; SUBCUTANEOUS at 18:30

## 2017-09-05 RX ADMIN — Medication 5 MILLIGRAM(S): at 23:12

## 2017-09-05 RX ADMIN — HYDROMORPHONE HYDROCHLORIDE 30 MILLILITER(S): 2 INJECTION INTRAMUSCULAR; INTRAVENOUS; SUBCUTANEOUS at 18:21

## 2017-09-05 RX ADMIN — Medication 400 MILLIGRAM(S): at 23:11

## 2017-09-05 RX ADMIN — HYDROMORPHONE HYDROCHLORIDE 0.25 MILLIGRAM(S): 2 INJECTION INTRAMUSCULAR; INTRAVENOUS; SUBCUTANEOUS at 18:15

## 2017-09-05 RX ADMIN — HYDROMORPHONE HYDROCHLORIDE 30 MILLILITER(S): 2 INJECTION INTRAMUSCULAR; INTRAVENOUS; SUBCUTANEOUS at 20:11

## 2017-09-05 RX ADMIN — HYDROMORPHONE HYDROCHLORIDE 30 MILLILITER(S): 2 INJECTION INTRAMUSCULAR; INTRAVENOUS; SUBCUTANEOUS at 22:09

## 2017-09-05 RX ADMIN — SODIUM CHLORIDE 3 MILLILITER(S): 9 INJECTION INTRAMUSCULAR; INTRAVENOUS; SUBCUTANEOUS at 11:48

## 2017-09-05 RX ADMIN — Medication 5 MILLIGRAM(S): at 19:01

## 2017-09-05 RX ADMIN — HYDROMORPHONE HYDROCHLORIDE 30 MILLILITER(S): 2 INJECTION INTRAMUSCULAR; INTRAVENOUS; SUBCUTANEOUS at 23:01

## 2017-09-05 RX ADMIN — Medication 1000 MILLIGRAM(S): at 23:41

## 2017-09-05 NOTE — PATIENT PROFILE ADULT. - PMH
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

## 2017-09-05 NOTE — PROGRESS NOTE ADULT - ASSESSMENT
ASSESSMENT: Sanjeev Seaman is a 82 y.o. man who is POD 0 from laparoscopic small bowel resection. In the immediate post-operative period, the patient is having some abdominal pain, but it is mostly well controlled with the PCA. No nausea.     PLAN:   - NPO  - Betancur  - Cefotetan  - PCA + IV Tylenol

## 2017-09-05 NOTE — PROCEDURE NOTE - ADDITIONAL PROCEDURE DETAILS
Indication: Post procedure/ Magnet used  Normal sensing and pacing thresholds. Normal lead impedance.  Normal ICD function.   ICD remains activated.   Changes Made: None    [place] [id number]

## 2017-09-05 NOTE — PROGRESS NOTE ADULT - SUBJECTIVE AND OBJECTIVE BOX
Red Team Surgery  Post-operative Check    SUBJECTIVE: Pain mostly controlled with PCA. No nausea or vomiting. No flatus. No SOB.     OBJECTIVE:  Physical Exam:  - General: alert, interactive, no acute distress  - Abdomen: soft, minimally distended, tender at incision sites   - : some blood-tinged urine in Betancur bag    ICU Vital Signs Last 24 Hrs  T(C): 36.6 (05 Sep 2017 20:00), Max: 36.6 (05 Sep 2017 17:10)  T(F): 97.9 (05 Sep 2017 20:00), Max: 97.9 (05 Sep 2017 17:10)  HR: 92 (05 Sep 2017 20:00) (91 - 117)  BP: 129/68 (05 Sep 2017 19:30) (129/68 - 150/78)  BP(mean): 92 (05 Sep 2017 19:30) (85 - 109)  ABP: 160/84 (05 Sep 2017 20:00) (148/88 - 169/88)  ABP(mean): 110 (05 Sep 2017 20:00) (103 - 114)  RR: 18 (05 Sep 2017 20:00) (16 - 19)  SpO2: 100% (05 Sep 2017 20:00) (96% - 100%)    I&O's Detail    05 Sep 2017 07:01  -  05 Sep 2017 20:18  --------------------------------------------------------  IN:    lactated ringers.: 225 mL  Total IN: 225 mL    OUT:    Indwelling Catheter - Urethral: 100 mL  Total OUT: 100 mL    Total NET: 125 mL    MEDICATIONS  (STANDING):  cefoTEtan  IVPB 2 Gram(s) IV Intermittent once  HYDROmorphone PCA (1 mG/mL) 30 milliLiter(s) PCA Continuous PCA Continuous  lactated ringers. 1000 milliLiter(s) (75 mL/Hr) IV Continuous <Continuous>  pantoprazole  Injectable 40 milliGRAM(s) IV Push daily  metoprolol Injectable 5 milliGRAM(s) IV Push every 6 hours  acetaminophen  IVPB. 1000 milliGRAM(s) IV Intermittent once    MEDICATIONS  (PRN):  HYDROmorphone  Injectable 0.25 milliGRAM(s) IV Push every 10 minutes PRN Moderate Pain (4 - 6)  ondansetron Injectable 4 milliGRAM(s) IV Push once PRN Nausea and/or Vomiting  HYDROmorphone PCA (1 mG/mL) Rescue Clinician Bolus 0.25 milliGRAM(s) IV Push every 15 minutes PRN for Pain Scale GREATER THAN 6  naloxone Injectable 0.1 milliGRAM(s) IV Push every 3 minutes PRN For ANY of the following changes in patient status:  A. RR LESS THAN 10 breaths per minute, B. Oxygen saturation LESS THAN 90%, C. Sedation score of 6  ondansetron Injectable 4 milliGRAM(s) IV Push every 6 hours PRN Nausea

## 2017-09-06 LAB
ANION GAP SERPL CALC-SCNC: 16 MMOL/L — SIGNIFICANT CHANGE UP (ref 5–17)
ANION GAP SERPL CALC-SCNC: 20 MMOL/L — HIGH (ref 5–17)
BUN SERPL-MCNC: 22 MG/DL — SIGNIFICANT CHANGE UP (ref 7–23)
BUN SERPL-MCNC: 26 MG/DL — HIGH (ref 7–23)
CALCIUM SERPL-MCNC: 9.2 MG/DL — SIGNIFICANT CHANGE UP (ref 8.4–10.5)
CALCIUM SERPL-MCNC: 9.5 MG/DL — SIGNIFICANT CHANGE UP (ref 8.4–10.5)
CHLORIDE SERPL-SCNC: 100 MMOL/L — SIGNIFICANT CHANGE UP (ref 96–108)
CHLORIDE SERPL-SCNC: 100 MMOL/L — SIGNIFICANT CHANGE UP (ref 96–108)
CO2 SERPL-SCNC: 22 MMOL/L — SIGNIFICANT CHANGE UP (ref 22–31)
CO2 SERPL-SCNC: 22 MMOL/L — SIGNIFICANT CHANGE UP (ref 22–31)
CREAT SERPL-MCNC: 1.31 MG/DL — HIGH (ref 0.5–1.3)
CREAT SERPL-MCNC: 1.41 MG/DL — HIGH (ref 0.5–1.3)
GLUCOSE SERPL-MCNC: 120 MG/DL — HIGH (ref 70–99)
GLUCOSE SERPL-MCNC: 167 MG/DL — HIGH (ref 70–99)
HCT VFR BLD CALC: 43.3 % — SIGNIFICANT CHANGE UP (ref 39–50)
HCT VFR BLD CALC: 44.6 % — SIGNIFICANT CHANGE UP (ref 39–50)
HGB BLD-MCNC: 13.8 G/DL — SIGNIFICANT CHANGE UP (ref 13–17)
HGB BLD-MCNC: 14.2 G/DL — SIGNIFICANT CHANGE UP (ref 13–17)
MAGNESIUM SERPL-MCNC: 2 MG/DL — SIGNIFICANT CHANGE UP (ref 1.6–2.6)
MCHC RBC-ENTMCNC: 28.3 PG — SIGNIFICANT CHANGE UP (ref 27–34)
MCHC RBC-ENTMCNC: 29.1 PG — SIGNIFICANT CHANGE UP (ref 27–34)
MCHC RBC-ENTMCNC: 31.9 GM/DL — LOW (ref 32–36)
MCHC RBC-ENTMCNC: 31.9 GM/DL — LOW (ref 32–36)
MCV RBC AUTO: 88.9 FL — SIGNIFICANT CHANGE UP (ref 80–100)
MCV RBC AUTO: 91.3 FL — SIGNIFICANT CHANGE UP (ref 80–100)
PHOSPHATE SERPL-MCNC: 4.4 MG/DL — SIGNIFICANT CHANGE UP (ref 2.5–4.5)
PLATELET # BLD AUTO: 169 K/UL — SIGNIFICANT CHANGE UP (ref 150–400)
PLATELET # BLD AUTO: 189 K/UL — SIGNIFICANT CHANGE UP (ref 150–400)
POTASSIUM SERPL-MCNC: 4.6 MMOL/L — SIGNIFICANT CHANGE UP (ref 3.5–5.3)
POTASSIUM SERPL-MCNC: 4.9 MMOL/L — SIGNIFICANT CHANGE UP (ref 3.5–5.3)
POTASSIUM SERPL-SCNC: 4.6 MMOL/L — SIGNIFICANT CHANGE UP (ref 3.5–5.3)
POTASSIUM SERPL-SCNC: 4.9 MMOL/L — SIGNIFICANT CHANGE UP (ref 3.5–5.3)
RBC # BLD: 4.87 M/UL — SIGNIFICANT CHANGE UP (ref 4.2–5.8)
RBC # BLD: 4.88 M/UL — SIGNIFICANT CHANGE UP (ref 4.2–5.8)
RBC # FLD: 26.1 % — HIGH (ref 10.3–14.5)
RBC # FLD: SIGNIFICANT CHANGE UP (ref 10.3–14.5)
SODIUM SERPL-SCNC: 138 MMOL/L — SIGNIFICANT CHANGE UP (ref 135–145)
SODIUM SERPL-SCNC: 142 MMOL/L — SIGNIFICANT CHANGE UP (ref 135–145)
WBC # BLD: 7.95 K/UL — SIGNIFICANT CHANGE UP (ref 3.8–10.5)
WBC # BLD: 8.7 K/UL — SIGNIFICANT CHANGE UP (ref 3.8–10.5)
WBC # FLD AUTO: 7.95 K/UL — SIGNIFICANT CHANGE UP (ref 3.8–10.5)
WBC # FLD AUTO: 8.7 K/UL — SIGNIFICANT CHANGE UP (ref 3.8–10.5)

## 2017-09-06 RX ORDER — DEXTROSE MONOHYDRATE, SODIUM CHLORIDE, AND POTASSIUM CHLORIDE 50; .745; 4.5 G/1000ML; G/1000ML; G/1000ML
1000 INJECTION, SOLUTION INTRAVENOUS
Qty: 0 | Refills: 0 | Status: DISCONTINUED | OUTPATIENT
Start: 2017-09-06 | End: 2017-09-06

## 2017-09-06 RX ORDER — SODIUM CHLORIDE 9 MG/ML
1000 INJECTION INTRAMUSCULAR; INTRAVENOUS; SUBCUTANEOUS
Qty: 0 | Refills: 0 | Status: DISCONTINUED | OUTPATIENT
Start: 2017-09-06 | End: 2017-09-07

## 2017-09-06 RX ORDER — FUROSEMIDE 40 MG
20 TABLET ORAL
Qty: 0 | Refills: 0 | Status: DISCONTINUED | OUTPATIENT
Start: 2017-09-06 | End: 2017-09-08

## 2017-09-06 RX ORDER — SODIUM CHLORIDE 9 MG/ML
500 INJECTION INTRAMUSCULAR; INTRAVENOUS; SUBCUTANEOUS ONCE
Qty: 0 | Refills: 0 | Status: COMPLETED | OUTPATIENT
Start: 2017-09-06 | End: 2017-09-06

## 2017-09-06 RX ADMIN — PANTOPRAZOLE SODIUM 40 MILLIGRAM(S): 20 TABLET, DELAYED RELEASE ORAL at 12:11

## 2017-09-06 RX ADMIN — ELTROMBOPAG OLAMINE 150 MILLIGRAM(S): 50 TABLET, FILM COATED ORAL at 21:58

## 2017-09-06 RX ADMIN — HEPARIN SODIUM 5000 UNIT(S): 5000 INJECTION INTRAVENOUS; SUBCUTANEOUS at 15:40

## 2017-09-06 RX ADMIN — HYDROMORPHONE HYDROCHLORIDE 30 MILLILITER(S): 2 INJECTION INTRAMUSCULAR; INTRAVENOUS; SUBCUTANEOUS at 18:58

## 2017-09-06 RX ADMIN — HEPARIN SODIUM 5000 UNIT(S): 5000 INJECTION INTRAVENOUS; SUBCUTANEOUS at 23:44

## 2017-09-06 RX ADMIN — Medication 20 MILLIGRAM(S): at 15:41

## 2017-09-06 RX ADMIN — Medication 5 MILLIGRAM(S): at 23:44

## 2017-09-06 RX ADMIN — Medication 5 MILLIGRAM(S): at 06:27

## 2017-09-06 RX ADMIN — Medication 5 MILLIGRAM(S): at 12:11

## 2017-09-06 RX ADMIN — Medication 5 MILLIGRAM(S): at 17:09

## 2017-09-06 RX ADMIN — HEPARIN SODIUM 5000 UNIT(S): 5000 INJECTION INTRAVENOUS; SUBCUTANEOUS at 10:41

## 2017-09-06 RX ADMIN — HYDROMORPHONE HYDROCHLORIDE 30 MILLILITER(S): 2 INJECTION INTRAMUSCULAR; INTRAVENOUS; SUBCUTANEOUS at 06:59

## 2017-09-06 RX ADMIN — Medication 400 MILLIGRAM(S): at 06:27

## 2017-09-06 RX ADMIN — SODIUM CHLORIDE 1000 MILLILITER(S): 9 INJECTION INTRAMUSCULAR; INTRAVENOUS; SUBCUTANEOUS at 17:09

## 2017-09-06 RX ADMIN — DEXTROSE MONOHYDRATE, SODIUM CHLORIDE, AND POTASSIUM CHLORIDE 75 MILLILITER(S): 50; .745; 4.5 INJECTION, SOLUTION INTRAVENOUS at 10:44

## 2017-09-06 RX ADMIN — Medication 1000 MILLIGRAM(S): at 06:42

## 2017-09-06 NOTE — CONSULT NOTE ADULT - PROBLEM SELECTOR RECOMMENDATION 2
history of aplastic anemia plus a PNH subpopulation on Eltrombobag 150mg oral daily as an outpatient.  has been on sandimmune and prednisone ( 7/2013-6/2014)  trend cbc daily

## 2017-09-06 NOTE — CONSULT NOTE ADULT - ASSESSMENT
81 y/o male with pmhx of aplastic anemia plus a PNH subpopulation on Eltrombobag CAD s/p PCI with GREYSON x 3 in 2008, cardiomyopathy last EF 20-25% s/p ICD placement in 2014, fall in 2014 with subdural hematoma s/p evacuation at [facility], chronic afib - no coumadin 2/2 severe aplastic anemia with thrombocytopenia, (recent plts 220); has had progressive anemia and was found to have ulcerative mass s/p small bowel resection on 9/5/17.

## 2017-09-06 NOTE — PROGRESS NOTE ADULT - SUBJECTIVE AND OBJECTIVE BOX
Day 1 of Anesthesia Pain Management Service    SUBJECTIVE: Patient is doing well with IV PCA    Pain Scale Score:	[X] Refer to charted pain scores    THERAPY:    [ ] IV PCA Morphine		[ ] 5 mg/mL	[ ] 1 mg/mL  [X] IV PCA Hydromorphone	[ ] 5 mg/mL	[X] 1 mg/mL  [ ] IV PCA Fentanyl		[ ] 50 micrograms/mL    Demand dose: 0.1 mg     Lockout: 10 minutes   Continuous Rate: 0 mg/hr  4 Hour Limit: 3 mg    MEDICATIONS  (STANDING):  HYDROmorphone PCA (1 mG/mL) 30 milliLiter(s) PCA Continuous PCA Continuous  heparin  Injectable 5000 Unit(s) SubCutaneous every 8 hours  pantoprazole  Injectable 40 milliGRAM(s) IV Push daily  metoprolol Injectable 5 milliGRAM(s) IV Push every 6 hours  eltrombopag 150 milliGRAM(s) Oral at bedtime  dextrose 5% + sodium chloride 0.45% with potassium chloride 20 mEq/L 1000 milliLiter(s) (75 mL/Hr) IV Continuous <Continuous>    MEDICATIONS  (PRN):  HYDROmorphone PCA (1 mG/mL) Rescue Clinician Bolus 0.25 milliGRAM(s) IV Push every 15 minutes PRN for Pain Scale GREATER THAN 6  naloxone Injectable 0.1 milliGRAM(s) IV Push every 3 minutes PRN For ANY of the following changes in patient status:  A. RR LESS THAN 10 breaths per minute, B. Oxygen saturation LESS THAN 90%, C. Sedation score of 6  ondansetron Injectable 4 milliGRAM(s) IV Push every 6 hours PRN Nausea      OBJECTIVE:    Sedation Score:	[ X] Alert	[ ] Drowsy 	[ ] Arousable	[ ] Asleep	[ ] Unresponsive    Side Effects:	[X ] None	[ ] Nausea	[ ] Vomiting	[ ] Pruritus  		[ ] Other:    Vital Signs Last 24 Hrs  T(C): 36.4 (06 Sep 2017 06:27), Max: 36.9 (06 Sep 2017 00:35)  T(F): 97.5 (06 Sep 2017 06:27), Max: 98.4 (06 Sep 2017 00:35)  HR: 88 (06 Sep 2017 07:57) (84 - 118)  BP: 138/96 (06 Sep 2017 06:27) (120/74 - 150/78)  BP(mean): 105 (05 Sep 2017 22:00) (85 - 109)  RR: 18 (06 Sep 2017 06:27) (14 - 19)  SpO2: 96% (06 Sep 2017 06:27) (94% - 100%)    ASSESSMENT/ PLAN    Therapy to  be:               [X] Continued   [ ] Discontinued   [ ] Changed to PRN Analgesics    Documentation and Verification of current medications:   [X] Done	[ ] Not done, not eligible    Comments:

## 2017-09-06 NOTE — PROGRESS NOTE ADULT - SUBJECTIVE AND OBJECTIVE BOX
Pain Management Attending Addendum    SUBJECTIVE:    Therapy:	  [x ] IV PCA	   [ ] Epidural           [ ] s/p Spinal Opoid              [ ] Postpartum infusion	  [ ] Patient controlled regional anesthesia (PCRA)    [ ] prn Analgesics    OBJECTIVE:   [x ] No new signs     [ ] Other:    Side Effects:  [x ] None			[ ] Other:    Assessment of Catheter Site:		[ ] Intact		[ ] Other:    ASSESSMENT/PLAN  [ x] Continue current therapy    [ ] Therapy changed to:    [ ] IV PCA       [ ] Epidural     [ ] prn Analgesics     [ ] post partum infusion    Comments:

## 2017-09-06 NOTE — PROGRESS NOTE ADULT - SUBJECTIVE AND OBJECTIVE BOX
Pain Management Attending Addendum    SUBJECTIVE:    Therapy:	  [ ] IV PCA	   [x ] Epidural           [ ] s/p Spinal Opoid              [ ] Postpartum infusion	  [ ] Patient controlled regional anesthesia (PCRA)    [ ] prn Analgesics    OBJECTIVE:   [x ] No new signs     [ ] Other:    Side Effects:  [x ] None			[ ] Other:    Assessment of Catheter Site:		[x ] Intact		[ ] Other:    ASSESSMENT/PLAN  [x ] Continue current therapy    [ ] Therapy changed to:    [ ] IV PCA       [ ] Epidural     [ ] prn Analgesics     [ ] post partum infusion    Comments: Pull catheter if coags okay.

## 2017-09-06 NOTE — PROGRESS NOTE ADULT - SUBJECTIVE AND OBJECTIVE BOX
RED SURGERY DAILY PROGRESS NOTE:    S: Patient was transferred from the PACU to the floor yesterday after a diagnostic laparoscopy and small bowel resection. He had some igor-incisional pain overnight, but no acute events. His sleep was disrupted by his neighbor in the room having pain issues. His pain is currently 5/10. He denies any nausea/vomiting/flatus/bowel movements.    O:  Vital Signs Last 24 Hrs  T(C): 36.4 (06 Sep 2017 06:27), Max: 36.9 (06 Sep 2017 00:35)  T(F): 97.5 (06 Sep 2017 06:27), Max: 98.4 (06 Sep 2017 00:35)  HR: 118 (06 Sep 2017 06:27) (84 - 118)  BP: 138/96 (06 Sep 2017 06:27) (120/74 - 150/78)  BP(mean): 105 (05 Sep 2017 22:00) (85 - 109)  RR: 18 (06 Sep 2017 06:27) (14 - 19)  SpO2: 96% (06 Sep 2017 06:27) (94% - 100%)    I&O's Detail    05 Sep 2017 07:01  -  06 Sep 2017 07:00  --------------------------------------------------------  IN:    lactated ringers.: 1275 mL  Total IN: 1275 mL    OUT:    Indwelling Catheter - Urethral: 585 mL  Total OUT: 585 mL    Total NET: 690 mL    MEDICATIONS  (STANDING):  cefoTEtan  IVPB 2 Gram(s) IV Intermittent once  HYDROmorphone PCA (1 mG/mL) 30 milliLiter(s) PCA Continuous PCA Continuous  heparin  Injectable 5000 Unit(s) SubCutaneous every 8 hours  lactated ringers. 1000 milliLiter(s) (75 mL/Hr) IV Continuous <Continuous>  pantoprazole  Injectable 40 milliGRAM(s) IV Push daily  metoprolol Injectable 5 milliGRAM(s) IV Push every 6 hours  eltrombopag 150 milliGRAM(s) Oral at bedtime    MEDICATIONS  (PRN):  HYDROmorphone PCA (1 mG/mL) Rescue Clinician Bolus 0.25 milliGRAM(s) IV Push every 15 minutes PRN for Pain Scale GREATER THAN 6  naloxone Injectable 0.1 milliGRAM(s) IV Push every 3 minutes PRN For ANY of the following changes in patient status:  A. RR LESS THAN 10 breaths per minute, B. Oxygen saturation LESS THAN 90%, C. Sedation score of 6  ondansetron Injectable 4 milliGRAM(s) IV Push every 6 hours PRN Nausea    Physical Exam:  Gen: Laying in bed, NAD, alert and oriented.   Resp: Unlabored breathing  Abd: soft, NT, ND, midline incision with mauricio and gauze changed over it, OpSites clean    Lines:   IV: patent, in place.

## 2017-09-06 NOTE — CONSULT NOTE ADULT - SUBJECTIVE AND OBJECTIVE BOX
HPI:  83 y/o male with pmhx of CAD s/p PCI with GREYSON x 3 in 2008, cardiomyopathy last EF 20-25% s/p ICD placement in 2014, fall in 2014 with subdural hematoma s/p evacuation at [facility], chronic afib - no coumadin 2/2 severe aplastic anemia with thrombocytopenia, (recent plts 220). Pt states his H&H began to decline with stable plts - was sent for colonoscopy/capsule endoscopy with + small bowel lesion suspicious for malignancy. Pt states he has been feeling well, no unintentional weight loss, fever/chills, recent infections, denies abdominal pain/melena/hematochezia. Presents for exploratory laparotomy, small bowel resection. (24 Aug 2017 13:59)      PAST MEDICAL & SURGICAL HISTORY:  PNH (paroxysmal nocturnal hemoglobinuria): hypercoaguable state  Chronic atrial fibrillation  Thoracic aortic aneurysm without rupture: 4.8cm 2017  [place] (hard of hearing): b/l hearing aids  Cardiomyopathy  CAD (coronary artery disease)  Aplastic anemia  Pancytopenia: current plts 220  S/P ICD (internal cardiac defibrillator) procedure: 2014  S/P brain surgery: 2014 subdural hematoma      Allergies    No Known Allergies    Intolerances        MEDICATIONS  (STANDING):  HYDROmorphone PCA (1 mG/mL) 30 milliLiter(s) PCA Continuous PCA Continuous  heparin  Injectable 5000 Unit(s) SubCutaneous every 8 hours  pantoprazole  Injectable 40 milliGRAM(s) IV Push daily  metoprolol Injectable 5 milliGRAM(s) IV Push every 6 hours  eltrombopag 150 milliGRAM(s) Oral at bedtime  dextrose 5% + sodium chloride 0.45% with potassium chloride 20 mEq/L 1000 milliLiter(s) (75 mL/Hr) IV Continuous <Continuous>    MEDICATIONS  (PRN):  HYDROmorphone PCA (1 mG/mL) Rescue Clinician Bolus 0.25 milliGRAM(s) IV Push every 15 minutes PRN for Pain Scale GREATER THAN 6  naloxone Injectable 0.1 milliGRAM(s) IV Push every 3 minutes PRN For ANY of the following changes in patient status:  A. RR LESS THAN 10 breaths per minute, B. Oxygen saturation LESS THAN 90%, C. Sedation score of 6  ondansetron Injectable 4 milliGRAM(s) IV Push every 6 hours PRN Nausea      FAMILY HISTORY:      SOCIAL HISTORY: No EtOH, no tobacco    REVIEW OF SYSTEMS:    CONSTITUTIONAL: No weakness, fevers or chills  EYES/ENT: No visual changes;  No vertigo or throat pain   NECK: No pain or stiffness  RESPIRATORY: No cough, wheezing, hemoptysis; No shortness of breath  CARDIOVASCULAR: No chest pain or palpitations  GASTROINTESTINAL: No abdominal or epigastric pain. No nausea, vomiting, or hematemesis; No diarrhea or constipation. No melena or hematochezia.  GENITOURINARY: No dysuria, frequency or hematuria  NEUROLOGICAL: No numbness or weakness  SKIN: No itching, burning, rashes, or lesions   All other review of systems is negative unless indicated above.        T(F): 97.5 (09-06-17 @ 13:54), Max: 98.4 (09-06-17 @ 00:35)  HR: 96 (09-06-17 @ 13:54)  BP: 131/80 (09-06-17 @ 13:54)  RR: 18 (09-06-17 @ 13:54)  SpO2: 96% (09-06-17 @ 13:54)  Wt(kg): --    GENERAL: NAD, well-developed  HEAD:  Atraumatic, Normocephalic  EYES: EOMI, PERRLA, conjunctiva and sclera clear  NECK: Supple, No JVD  CHEST/LUNG: Clear to auscultation bilaterally; No wheeze  HEART: Regular rate and rhythm; No murmurs, rubs, or gallops  ABDOMEN: Soft, Nontender, Nondistended; Bowel sounds present  EXTREMITIES:  2+ Peripheral Pulses, No clubbing, cyanosis, or edema  NEUROLOGY: non-focal  SKIN: No rashes or lesions                          13.8   7.95  )-----------( 169      ( 06 Sep 2017 08:41 )             43.3       09-06    142  |  100  |  22  ----------------------------<  120<H>  4.9   |  22  |  1.31<H>    Ca    9.2      06 Sep 2017 09:02  Phos  4.4     09-06  Mg     2.0     09-06        Magnesium, Serum: 2.0 mg/dL (09-06 @ 09:02)  Phosphorus Level, Serum: 4.4 mg/dL (09-06 @ 09:02) HPI:  81 y/o male with pmhx of aplastic anemia plus a PNH subpopulation on Eltrombobag, CAD s/p PCI with GREYSON x 3 in 2008, cardiomyopathy last EF 20-25% s/p ICD placement in 2014, fall in 2014 with subdural hematoma s/p evacuation at [facility], chronic afib - no coumadin 2/2 severe aplastic anemia with thrombocytopenia, (recent plts 220). Pt states his H&H began to decline with stable plts - was sent for colonoscopy/capsule endoscopy with + small bowel lesion suspicious for malignancy. Pt states he has been feeling well, no unintentional weight loss, fever/chills, recent infections, denies abdominal pain/melena/hematochezia. Presents for exploratory laparotomy, small bowel resection. (24 Aug 2017 13:59)      PAST MEDICAL & SURGICAL HISTORY:  PNH (paroxysmal nocturnal hemoglobinuria): hypercoaguable state  Chronic atrial fibrillation  Thoracic aortic aneurysm without rupture: 4.8cm 2017  [place] (hard of hearing): b/l hearing aids  Cardiomyopathy  CAD (coronary artery disease)  Aplastic anemia  Pancytopenia: current plts 220  S/P ICD (internal cardiac defibrillator) procedure: 2014  S/P brain surgery: 2014 subdural hematoma      Allergies    No Known Allergies    Intolerances        MEDICATIONS  (STANDING):  HYDROmorphone PCA (1 mG/mL) 30 milliLiter(s) PCA Continuous PCA Continuous  heparin  Injectable 5000 Unit(s) SubCutaneous every 8 hours  pantoprazole  Injectable 40 milliGRAM(s) IV Push daily  metoprolol Injectable 5 milliGRAM(s) IV Push every 6 hours  eltrombopag 150 milliGRAM(s) Oral at bedtime  dextrose 5% + sodium chloride 0.45% with potassium chloride 20 mEq/L 1000 milliLiter(s) (75 mL/Hr) IV Continuous <Continuous>    MEDICATIONS  (PRN):  HYDROmorphone PCA (1 mG/mL) Rescue Clinician Bolus 0.25 milliGRAM(s) IV Push every 15 minutes PRN for Pain Scale GREATER THAN 6  naloxone Injectable 0.1 milliGRAM(s) IV Push every 3 minutes PRN For ANY of the following changes in patient status:  A. RR LESS THAN 10 breaths per minute, B. Oxygen saturation LESS THAN 90%, C. Sedation score of 6  ondansetron Injectable 4 milliGRAM(s) IV Push every 6 hours PRN Nausea      FAMILY HISTORY:      SOCIAL HISTORY: No EtOH, no tobacco    REVIEW OF SYSTEMS:    CONSTITUTIONAL: No weakness, fevers or chills  EYES/ENT: No visual changes;  No vertigo or throat pain   NECK: No pain or stiffness  RESPIRATORY: No cough, wheezing, hemoptysis; No shortness of breath  CARDIOVASCULAR: No chest pain or palpitations  GASTROINTESTINAL: No abdominal or epigastric pain. No nausea, vomiting, or hematemesis; No diarrhea or constipation. No melena or hematochezia.  GENITOURINARY: No dysuria, frequency or hematuria  NEUROLOGICAL: No numbness or weakness  SKIN: No itching, burning, rashes, or lesions   All other review of systems is negative unless indicated above.        T(F): 97.5 (09-06-17 @ 13:54), Max: 98.4 (09-06-17 @ 00:35)  HR: 96 (09-06-17 @ 13:54)  BP: 131/80 (09-06-17 @ 13:54)  RR: 18 (09-06-17 @ 13:54)  SpO2: 96% (09-06-17 @ 13:54)  Wt(kg): --    GENERAL: NAD, well-developed  HEAD:  Atraumatic, Normocephalic  EYES: EOMI, PERRLA, conjunctiva and sclera clear  NECK: Supple, No JVD  CHEST/LUNG: Clear to auscultation bilaterally; No wheeze  HEART: Regular rate and rhythm; No murmurs, rubs, or gallops  ABDOMEN: Soft, Nontender, Nondistended; Bowel sounds present  EXTREMITIES:  2+ Peripheral Pulses, No clubbing, cyanosis, or edema  NEUROLOGY: non-focal  SKIN: No rashes or lesions                          13.8   7.95  )-----------( 169      ( 06 Sep 2017 08:41 )             43.3       09-06    142  |  100  |  22  ----------------------------<  120<H>  4.9   |  22  |  1.31<H>    Ca    9.2      06 Sep 2017 09:02  Phos  4.4     09-06  Mg     2.0     09-06        Magnesium, Serum: 2.0 mg/dL (09-06 @ 09:02)  Phosphorus Level, Serum: 4.4 mg/dL (09-06 @ 09:02) HPI:  81 y/o male with pmhx of aplastic anemia plus a PNH subpopulation on Eltrombobag, CAD s/p PCI with GREYSON x 3 in 2008, cardiomyopathy last EF 20-25% s/p ICD placement in 2014, fall in 2014 with subdural hematoma s/p evacuation at [facility], chronic afib - no coumadin 2/2 severe aplastic anemia with thrombocytopenia, (recent plts 220 presents for surgical resection of small bowel mass found concerning for malignancy as etiology of his progressive microcytic anemia.    The patient is accompanied by his wife. he said he had surgery yesterday, he is not urinating a lot since the surgery and has not been able to eat anything since Sunday night.    He says he has felt a little weak and has had less energy the past couple of months. he says he has had endoscopies, colonoscopies and a capsule endoscopy to look for the cause of his anemia.    he denies any blood in his stool.    PAST MEDICAL & SURGICAL HISTORY:  PNH (paroxysmal nocturnal hemoglobinuria): hypercoaguable state  Chronic atrial fibrillation  Thoracic aortic aneurysm without rupture: 4.8cm 2017  [place] (hard of hearing): b/l hearing aids  Cardiomyopathy  CAD (coronary artery disease)  Aplastic anemia  Pancytopenia: current plts 220  S/P ICD (internal cardiac defibrillator) procedure: 2014  S/P brain surgery: 2014 subdural hematoma      Allergies    No Known Allergies    Intolerances        MEDICATIONS  (STANDING):  HYDROmorphone PCA (1 mG/mL) 30 milliLiter(s) PCA Continuous PCA Continuous  heparin  Injectable 5000 Unit(s) SubCutaneous every 8 hours  pantoprazole  Injectable 40 milliGRAM(s) IV Push daily  metoprolol Injectable 5 milliGRAM(s) IV Push every 6 hours  eltrombopag 150 milliGRAM(s) Oral at bedtime  dextrose 5% + sodium chloride 0.45% with potassium chloride 20 mEq/L 1000 milliLiter(s) (75 mL/Hr) IV Continuous <Continuous>    MEDICATIONS  (PRN):  HYDROmorphone PCA (1 mG/mL) Rescue Clinician Bolus 0.25 milliGRAM(s) IV Push every 15 minutes PRN for Pain Scale GREATER THAN 6  naloxone Injectable 0.1 milliGRAM(s) IV Push every 3 minutes PRN For ANY of the following changes in patient status:  A. RR LESS THAN 10 breaths per minute, B. Oxygen saturation LESS THAN 90%, C. Sedation score of 6  ondansetron Injectable 4 milliGRAM(s) IV Push every 6 hours PRN Nausea      FAMILY HISTORY:      SOCIAL HISTORY: No EtOH, no tobacco    REVIEW OF SYSTEMS:    see hpi        T(F): 97.5 (09-06-17 @ 13:54), Max: 98.4 (09-06-17 @ 00:35)  HR: 96 (09-06-17 @ 13:54)  BP: 131/80 (09-06-17 @ 13:54)  RR: 18 (09-06-17 @ 13:54)  SpO2: 96% (09-06-17 @ 13:54)  Wt(kg): --    GENERAL: NAD, well-developed  HEAD:  Atraumatic, Normocephalic  EYES: EOMI, PERRLA, conjunctiva and sclera clear  NECK: Supple, No JVD  CHEST/LUNG: Clear to auscultation bilaterally; No wheeze  HEART: irregular  ABDOMEN: Soft, Nontender, Nondistended; Bowel sounds present  EXTREMITIES:  2+ Peripheral Pulses, No clubbing, cyanosis, or edema  NEUROLOGY: non-focal  SKIN: No rashes or lesions                          13.8   7.95  )-----------( 169      ( 06 Sep 2017 08:41 )             43.3       09-06    142  |  100  |  22  ----------------------------<  120<H>  4.9   |  22  |  1.31<H>    Ca    9.2      06 Sep 2017 09:02  Phos  4.4     09-06  Mg     2.0     09-06        Magnesium, Serum: 2.0 mg/dL (09-06 @ 09:02)  Phosphorus Level, Serum: 4.4 mg/dL (09-06 @ 09:02)

## 2017-09-06 NOTE — CHART NOTE - NSCHARTNOTEFT_GEN_A_CORE
ABDOMINAL EXAM:    Patient was seen and examined this evening. He had some burping, which he often gets at home, and takes a medication for. He denies any nausea/vomiting. He is negative for flatus/bowel movements. He is soft, non-tender, and mildly distended on exam. He has a roblero in place and a midline incision under clean gauze.

## 2017-09-06 NOTE — PROGRESS NOTE ADULT - ASSESSMENT
A: 82M on POD1 s/p diagnostic laparoscopy and small bowel resection, progressing well.    P:  - OOB  - f/u GI function  - d/c roblero  - PCA for pain control  - NPO  - SQH DVT prophylaxis

## 2017-09-06 NOTE — PROVIDER CONTACT NOTE (OTHER) - SITUATION
Pt failed Dtv at 1600. pt was given 20mg of lasix earlier in afternoon. Pt feels he has to void but is unable too

## 2017-09-06 NOTE — CONSULT NOTE ADULT - CONSULT REASON
history of aplastic anemia, patient of Dr. Yeager progressive anemia in setting of aplastic anemia; patient of Dr. Yeager

## 2017-09-07 LAB
ANION GAP SERPL CALC-SCNC: 17 MMOL/L — SIGNIFICANT CHANGE UP (ref 5–17)
BUN SERPL-MCNC: 27 MG/DL — HIGH (ref 7–23)
CALCIUM SERPL-MCNC: 9.7 MG/DL — SIGNIFICANT CHANGE UP (ref 8.4–10.5)
CHLORIDE SERPL-SCNC: 100 MMOL/L — SIGNIFICANT CHANGE UP (ref 96–108)
CO2 SERPL-SCNC: 23 MMOL/L — SIGNIFICANT CHANGE UP (ref 22–31)
CREAT SERPL-MCNC: 1.38 MG/DL — HIGH (ref 0.5–1.3)
GLUCOSE SERPL-MCNC: 134 MG/DL — HIGH (ref 70–99)
HCT VFR BLD CALC: 44.7 % — SIGNIFICANT CHANGE UP (ref 39–50)
HGB BLD-MCNC: 14.5 G/DL — SIGNIFICANT CHANGE UP (ref 13–17)
MAGNESIUM SERPL-MCNC: 2.1 MG/DL — SIGNIFICANT CHANGE UP (ref 1.6–2.6)
MCHC RBC-ENTMCNC: 29.8 PG — SIGNIFICANT CHANGE UP (ref 27–34)
MCHC RBC-ENTMCNC: 32.5 GM/DL — SIGNIFICANT CHANGE UP (ref 32–36)
MCV RBC AUTO: 91.5 FL — SIGNIFICANT CHANGE UP (ref 80–100)
PHOSPHATE SERPL-MCNC: 2.7 MG/DL — SIGNIFICANT CHANGE UP (ref 2.5–4.5)
PLATELET # BLD AUTO: 201 K/UL — SIGNIFICANT CHANGE UP (ref 150–400)
POTASSIUM SERPL-MCNC: 4.5 MMOL/L — SIGNIFICANT CHANGE UP (ref 3.5–5.3)
POTASSIUM SERPL-SCNC: 4.5 MMOL/L — SIGNIFICANT CHANGE UP (ref 3.5–5.3)
RBC # BLD: 4.88 M/UL — SIGNIFICANT CHANGE UP (ref 4.2–5.8)
RBC # FLD: 26.8 % — HIGH (ref 10.3–14.5)
SODIUM SERPL-SCNC: 140 MMOL/L — SIGNIFICANT CHANGE UP (ref 135–145)
WBC # BLD: 8.5 K/UL — SIGNIFICANT CHANGE UP (ref 3.8–10.5)
WBC # FLD AUTO: 8.5 K/UL — SIGNIFICANT CHANGE UP (ref 3.8–10.5)

## 2017-09-07 PROCEDURE — 93010 ELECTROCARDIOGRAM REPORT: CPT

## 2017-09-07 RX ORDER — CARVEDILOL PHOSPHATE 80 MG/1
25 CAPSULE, EXTENDED RELEASE ORAL ONCE
Qty: 0 | Refills: 0 | Status: COMPLETED | OUTPATIENT
Start: 2017-09-07 | End: 2017-09-07

## 2017-09-07 RX ORDER — TAMSULOSIN HYDROCHLORIDE 0.4 MG/1
0.4 CAPSULE ORAL AT BEDTIME
Qty: 0 | Refills: 0 | Status: DISCONTINUED | OUTPATIENT
Start: 2017-09-07 | End: 2017-09-08

## 2017-09-07 RX ORDER — SODIUM CHLORIDE 9 MG/ML
1000 INJECTION, SOLUTION INTRAVENOUS
Qty: 0 | Refills: 0 | Status: DISCONTINUED | OUTPATIENT
Start: 2017-09-07 | End: 2017-09-08

## 2017-09-07 RX ORDER — HYDROMORPHONE HYDROCHLORIDE 2 MG/ML
2 INJECTION INTRAMUSCULAR; INTRAVENOUS; SUBCUTANEOUS EVERY 4 HOURS
Qty: 0 | Refills: 0 | Status: DISCONTINUED | OUTPATIENT
Start: 2017-09-07 | End: 2017-09-08

## 2017-09-07 RX ORDER — CARVEDILOL PHOSPHATE 80 MG/1
25 CAPSULE, EXTENDED RELEASE ORAL EVERY 12 HOURS
Qty: 0 | Refills: 0 | Status: DISCONTINUED | OUTPATIENT
Start: 2017-09-07 | End: 2017-09-08

## 2017-09-07 RX ORDER — HYDROMORPHONE HYDROCHLORIDE 2 MG/ML
0.5 INJECTION INTRAMUSCULAR; INTRAVENOUS; SUBCUTANEOUS EVERY 4 HOURS
Qty: 0 | Refills: 0 | Status: DISCONTINUED | OUTPATIENT
Start: 2017-09-07 | End: 2017-09-08

## 2017-09-07 RX ADMIN — HEPARIN SODIUM 5000 UNIT(S): 5000 INJECTION INTRAVENOUS; SUBCUTANEOUS at 07:23

## 2017-09-07 RX ADMIN — HEPARIN SODIUM 5000 UNIT(S): 5000 INJECTION INTRAVENOUS; SUBCUTANEOUS at 23:34

## 2017-09-07 RX ADMIN — Medication 20 MILLIGRAM(S): at 05:14

## 2017-09-07 RX ADMIN — HYDROMORPHONE HYDROCHLORIDE 30 MILLILITER(S): 2 INJECTION INTRAMUSCULAR; INTRAVENOUS; SUBCUTANEOUS at 06:46

## 2017-09-07 RX ADMIN — Medication 20 MILLIGRAM(S): at 17:37

## 2017-09-07 RX ADMIN — HYDROMORPHONE HYDROCHLORIDE 30 MILLILITER(S): 2 INJECTION INTRAMUSCULAR; INTRAVENOUS; SUBCUTANEOUS at 18:56

## 2017-09-07 RX ADMIN — SODIUM CHLORIDE 75 MILLILITER(S): 9 INJECTION, SOLUTION INTRAVENOUS at 17:37

## 2017-09-07 RX ADMIN — Medication 5 MILLIGRAM(S): at 05:13

## 2017-09-07 RX ADMIN — ELTROMBOPAG OLAMINE 150 MILLIGRAM(S): 50 TABLET, FILM COATED ORAL at 21:01

## 2017-09-07 RX ADMIN — TAMSULOSIN HYDROCHLORIDE 0.4 MILLIGRAM(S): 0.4 CAPSULE ORAL at 20:57

## 2017-09-07 RX ADMIN — CARVEDILOL PHOSPHATE 25 MILLIGRAM(S): 80 CAPSULE, EXTENDED RELEASE ORAL at 07:21

## 2017-09-07 RX ADMIN — PANTOPRAZOLE SODIUM 40 MILLIGRAM(S): 20 TABLET, DELAYED RELEASE ORAL at 11:45

## 2017-09-07 RX ADMIN — CARVEDILOL PHOSPHATE 25 MILLIGRAM(S): 80 CAPSULE, EXTENDED RELEASE ORAL at 17:37

## 2017-09-07 RX ADMIN — HEPARIN SODIUM 5000 UNIT(S): 5000 INJECTION INTRAVENOUS; SUBCUTANEOUS at 17:37

## 2017-09-07 NOTE — PHYSICAL THERAPY INITIAL EVALUATION ADULT - PERTINENT HX OF CURRENT PROBLEM, REHAB EVAL
81 y/o male with pmhx of CAD s/p PCI with GREYSON x 3 in 2008, cardiomyopathy last EF 20-25% s/p ICD placement in 2014, fall in 2014 with subdural hematoma s/p evacuation at [facility], chronic afib - no coumadin 2/2 severe aplastic anemia with thrombocytopenia, (recent plts 220). contd below:

## 2017-09-07 NOTE — PROGRESS NOTE ADULT - SUBJECTIVE AND OBJECTIVE BOX
RED SURGERY DAILY PROGRESS NOTE:    S: Patient was seen and examined this evening. He had his roblero replaced overnight due to low output and to monitor ins and outs. He had some burping, which he often gets at home, and takes a medication for. He denies any nausea/vomiting. He is negative for flatus/bowel movements.     O:  Vital Signs Last 24 Hrs  T(C): 36.4 (07 Sep 2017 05:10), Max: 36.7 (06 Sep 2017 10:15)  T(F): 97.5 (07 Sep 2017 05:10), Max: 98 (06 Sep 2017 10:15)  HR: 124 (07 Sep 2017 05:10) (88 - 124)  BP: 149/91 (07 Sep 2017 05:10) (129/79 - 149/91)  BP(mean): --  RR: 17 (07 Sep 2017 05:10) (16 - 18)  SpO2: 100% (07 Sep 2017 05:10) (95% - 100%)    I&O's Detail    05 Sep 2017 07:01  -  06 Sep 2017 07:00  --------------------------------------------------------  IN:    lactated ringers.: 1275 mL  Total IN: 1275 mL    OUT:    Indwelling Catheter - Urethral: 585 mL  Total OUT: 585 mL    Total NET: 690 mL      06 Sep 2017 07:01  -  07 Sep 2017 05:28  --------------------------------------------------------  IN:    Sodium Chloride 0.9% IV Bolus: 675 mL    sodium chloride 0.9%.: 200 mL  Total IN: 875 mL    OUT:    Indwelling Catheter - Urethral: 690 mL  Total OUT: 690 mL    Total NET: 185 mL    09-06    138  |  100  |  26<H>  ----------------------------<  167<H>  4.6   |  22  |  1.41<H>    Ca    9.5      06 Sep 2017 17:40  Phos  4.4     09-06  Mg     2.0     09-06    Physical Exam:  Gen: Laying in bed, NAD, alert and oriented, roblero in  Resp: Unlabored breathing  Abd: soft, NT, ND, midline incision with mauricio and gauze changed over it, OpSites clean    Lines:   IV: patent, in place. RED SURGERY DAILY PROGRESS NOTE:    S: Patient was seen and examined this morning. He had his roblero replaced overnight due to low output and to monitor ins and outs. He had some burping, which he often gets at home, and takes a medication for. He denies any nausea/vomiting. He is now positive for flatus and negative for bowel movements. He was tachycardic, but asymptomatic when we saw him, so he was given metoprolol and an EKG was performed.    O:  Vital Signs Last 24 Hrs  T(C): 36.4 (07 Sep 2017 05:10), Max: 36.7 (06 Sep 2017 10:15)  T(F): 97.5 (07 Sep 2017 05:10), Max: 98 (06 Sep 2017 10:15)  HR: 124 (07 Sep 2017 05:10) (88 - 124)  BP: 149/91 (07 Sep 2017 05:10) (129/79 - 149/91)  BP(mean): --  RR: 17 (07 Sep 2017 05:10) (16 - 18)  SpO2: 100% (07 Sep 2017 05:10) (95% - 100%)    I&O's Detail    05 Sep 2017 07:01  -  06 Sep 2017 07:00  --------------------------------------------------------  IN:    lactated ringers.: 1275 mL  Total IN: 1275 mL    OUT:    Indwelling Catheter - Urethral: 585 mL  Total OUT: 585 mL    Total NET: 690 mL      06 Sep 2017 07:01  -  07 Sep 2017 05:28  --------------------------------------------------------  IN:    Sodium Chloride 0.9% IV Bolus: 675 mL    sodium chloride 0.9%.: 200 mL  Total IN: 875 mL    OUT:    Indwelling Catheter - Urethral: 690 mL  Total OUT: 690 mL    Total NET: 185 mL    09-06    138  |  100  |  26<H>  ----------------------------<  167<H>  4.6   |  22  |  1.41<H>    Ca    9.5      06 Sep 2017 17:40  Phos  4.4     09-06  Mg     2.0     09-06    Physical Exam:  Gen: Laying in bed, NAD, alert and oriented, roblero in  Resp: Unlabored breathing  Abd: soft, NT, mildly distended, midline incision with mauricio and gauze changed over it, OpSites off    Lines:   IV: patent, in place.

## 2017-09-07 NOTE — PROGRESS NOTE ADULT - ASSESSMENT
A: 82M on POD2 s/p diagnostic laparoscopy and small bowel resection, progressing well.    P:  - OOB  - f/u GI function  - discuss possibility of d/c'ing roblero again  - PCA for pain control  - NPO  - SQH DVT prophylaxis A: 82M on POD2 s/p diagnostic laparoscopy and small bowel resection, progressing well.    P:  - OOB  - f/u GI function  - Continue on Flomax  - STAT labs  - f/u EKG  - PCA for pain control  - NPO  - Home cardiac meds.  - SQH DVT prophylaxis

## 2017-09-07 NOTE — PHYSICAL THERAPY INITIAL EVALUATION ADULT - ADDITIONAL COMMENTS
contd frm above: Pt states his H&H began to decline with stable plts - was sent for colonoscopy/capsule endoscopy with + small bowel lesion suspicious for malignancy. Pt states he has been feeling well, no unintentional weight loss, fever/chills, recent infections, denies abdominal pain/melena/hematochezia.CT abd: mesenteric adenopathy; pt is s/p small bowel resection 9/5    social history: pt lives with wife and daughter in private house, bedrooms on the second floor, handrail on stairs. pt independent with mobility and did not use assistive device

## 2017-09-07 NOTE — PHYSICAL THERAPY INITIAL EVALUATION ADULT - CRITERIA FOR SKILLED THERAPEUTIC INTERVENTIONS
predicted duration of therapy intervention/functional limitations in following categories/anticipated discharge recommendation/impairments found/anticipated equipment needs at discharge/therapy frequency/rehab potential

## 2017-09-07 NOTE — PROVIDER CONTACT NOTE (OTHER) - RECOMMENDATIONS
As per pa 500 cc bolus given, bmp cbc drawn
team will come down to assess. complete an EKG.
MD come to bedside to assess.

## 2017-09-07 NOTE — PROGRESS NOTE ADULT - SUBJECTIVE AND OBJECTIVE BOX
Pain Management Attending Addendum    SUBJECTIVE:    Therapy:	  [x ] IV PCA	   [ ] Epidural           [ ] s/p Spinal Opoid              [ ] Postpartum infusion	  [ ] Patient controlled regional anesthesia (PCRA)    [ ] prn Analgesics    OBJECTIVE:   [x ] No new signs     [ ] Other:    Side Effects:  [ x] None			[ ] Other:    Assessment of Catheter Site:		[ ] Intact		[ ] Other:    ASSESSMENT/PLAN  [x ] Continue current therapy    [ ] Therapy changed to:    [ ] IV PCA       [ ] Epidural     [ ] prn Analgesics     [ ] post partum infusion    Comments:

## 2017-09-07 NOTE — PROVIDER CONTACT NOTE (OTHER) - ASSESSMENT
bladder scanned at bedside with DAVEY squires, 160cc of urine noted on scan
pt Is complaining of heartburn and belching. no nausea noted. abdomen is slightly distended.
pt asymptomatic, resting comfortably in the chair. no chest pain or shortness of breath. all other vitals stable.

## 2017-09-07 NOTE — PROGRESS NOTE ADULT - SUBJECTIVE AND OBJECTIVE BOX
Day 2 of Anesthesia Pain Management Service    SUBJECTIVE: Patient is doing well with IV PCA    Pain Scale Score:	[X] Refer to charted pain scores    THERAPY:    [ ] IV PCA Morphine		[ ] 5 mg/mL	[ ] 1 mg/mL  [X] IV PCA Hydromorphone	[ ] 5 mg/mL	[X] 1 mg/mL  [ ] IV PCA Fentanyl		[ ] 50 micrograms/mL    Demand dose: 0.1 mg     Lockout: 10 minutes   Continuous Rate: 0 mg/hr  4 Hour Limit: 4 mg    MEDICATIONS  (STANDING):  HYDROmorphone PCA (1 mG/mL) 30 milliLiter(s) PCA Continuous PCA Continuous  heparin  Injectable 5000 Unit(s) SubCutaneous every 8 hours  pantoprazole  Injectable 40 milliGRAM(s) IV Push daily  eltrombopag 150 milliGRAM(s) Oral at bedtime  furosemide    Tablet 20 milliGRAM(s) Oral two times a day  sodium chloride 0.9%. 1000 milliLiter(s) (100 mL/Hr) IV Continuous <Continuous>  tamsulosin 0.4 milliGRAM(s) Oral at bedtime  carvedilol 25 milliGRAM(s) Oral every 12 hours    MEDICATIONS  (PRN):  HYDROmorphone PCA (1 mG/mL) Rescue Clinician Bolus 0.25 milliGRAM(s) IV Push every 15 minutes PRN for Pain Scale GREATER THAN 6  naloxone Injectable 0.1 milliGRAM(s) IV Push every 3 minutes PRN For ANY of the following changes in patient status:  A. RR LESS THAN 10 breaths per minute, B. Oxygen saturation LESS THAN 90%, C. Sedation score of 6  ondansetron Injectable 4 milliGRAM(s) IV Push every 6 hours PRN Nausea      OBJECTIVE:    Sedation Score:	[ X] Alert	[ ] Drowsy 	[ ] Arousable	[ ] Asleep	[ ] Unresponsive    Side Effects:	[X ] None	[ ] Nausea	[ ] Vomiting	[ ] Pruritus  		[ ] Other:    Vital Signs Last 24 Hrs  T(C): 36.5 (07 Sep 2017 09:29), Max: 36.7 (06 Sep 2017 10:15)  T(F): 97.7 (07 Sep 2017 09:29), Max: 98 (06 Sep 2017 10:15)  HR: 90 (07 Sep 2017 09:29) (88 - 128)  BP: 113/75 (07 Sep 2017 09:29) (113/75 - 149/91)  BP(mean): --  RR: 18 (07 Sep 2017 09:29) (16 - 18)  SpO2: 96% (07 Sep 2017 09:29) (95% - 100%)    ASSESSMENT/ PLAN    Therapy to  be:               [X] Continued   [ ] Discontinued   [ ] Changed to PRN Analgesics    Documentation and Verification of current medications:   [X] Done	[ ] Not done, not eligible    Comments: Minimal use. Ambulating in hallways. Continue until tolerating po.

## 2017-09-08 ENCOUNTER — TRANSCRIPTION ENCOUNTER (OUTPATIENT)
Age: 82
End: 2017-09-08

## 2017-09-08 VITALS
OXYGEN SATURATION: 97 % | SYSTOLIC BLOOD PRESSURE: 127 MMHG | DIASTOLIC BLOOD PRESSURE: 73 MMHG | HEART RATE: 92 BPM | RESPIRATION RATE: 17 BRPM | TEMPERATURE: 98 F

## 2017-09-08 LAB
ANION GAP SERPL CALC-SCNC: 12 MMOL/L — SIGNIFICANT CHANGE UP (ref 5–17)
BUN SERPL-MCNC: 23 MG/DL — SIGNIFICANT CHANGE UP (ref 7–23)
CALCIUM SERPL-MCNC: 9.6 MG/DL — SIGNIFICANT CHANGE UP (ref 8.4–10.5)
CHLORIDE SERPL-SCNC: 98 MMOL/L — SIGNIFICANT CHANGE UP (ref 96–108)
CO2 SERPL-SCNC: 27 MMOL/L — SIGNIFICANT CHANGE UP (ref 22–31)
CREAT SERPL-MCNC: 1.19 MG/DL — SIGNIFICANT CHANGE UP (ref 0.5–1.3)
GLUCOSE SERPL-MCNC: 122 MG/DL — HIGH (ref 70–99)
HCT VFR BLD CALC: 44.5 % — SIGNIFICANT CHANGE UP (ref 39–50)
HGB BLD-MCNC: 14.6 G/DL — SIGNIFICANT CHANGE UP (ref 13–17)
MAGNESIUM SERPL-MCNC: 2 MG/DL — SIGNIFICANT CHANGE UP (ref 1.6–2.6)
MCHC RBC-ENTMCNC: 29.8 PG — SIGNIFICANT CHANGE UP (ref 27–34)
MCHC RBC-ENTMCNC: 32.9 GM/DL — SIGNIFICANT CHANGE UP (ref 32–36)
MCV RBC AUTO: 90.7 FL — SIGNIFICANT CHANGE UP (ref 80–100)
PHOSPHATE SERPL-MCNC: 2.2 MG/DL — LOW (ref 2.5–4.5)
PLATELET # BLD AUTO: 184 K/UL — SIGNIFICANT CHANGE UP (ref 150–400)
POTASSIUM SERPL-MCNC: 4.1 MMOL/L — SIGNIFICANT CHANGE UP (ref 3.5–5.3)
POTASSIUM SERPL-SCNC: 4.1 MMOL/L — SIGNIFICANT CHANGE UP (ref 3.5–5.3)
RBC # BLD: 4.91 M/UL — SIGNIFICANT CHANGE UP (ref 4.2–5.8)
RBC # FLD: 25.7 % — HIGH (ref 10.3–14.5)
SODIUM SERPL-SCNC: 137 MMOL/L — SIGNIFICANT CHANGE UP (ref 135–145)
WBC # BLD: 5.7 K/UL — SIGNIFICANT CHANGE UP (ref 3.8–10.5)
WBC # FLD AUTO: 5.7 K/UL — SIGNIFICANT CHANGE UP (ref 3.8–10.5)

## 2017-09-08 PROCEDURE — 80048 BASIC METABOLIC PNL TOTAL CA: CPT

## 2017-09-08 PROCEDURE — 82947 ASSAY GLUCOSE BLOOD QUANT: CPT

## 2017-09-08 PROCEDURE — 85014 HEMATOCRIT: CPT

## 2017-09-08 PROCEDURE — 83735 ASSAY OF MAGNESIUM: CPT

## 2017-09-08 PROCEDURE — 97162 PT EVAL MOD COMPLEX 30 MIN: CPT

## 2017-09-08 PROCEDURE — 82330 ASSAY OF CALCIUM: CPT

## 2017-09-08 PROCEDURE — 84132 ASSAY OF SERUM POTASSIUM: CPT

## 2017-09-08 PROCEDURE — 82435 ASSAY OF BLOOD CHLORIDE: CPT

## 2017-09-08 PROCEDURE — 82803 BLOOD GASES ANY COMBINATION: CPT

## 2017-09-08 PROCEDURE — 83605 ASSAY OF LACTIC ACID: CPT

## 2017-09-08 PROCEDURE — 86901 BLOOD TYPING SEROLOGIC RH(D): CPT

## 2017-09-08 PROCEDURE — 82565 ASSAY OF CREATININE: CPT

## 2017-09-08 PROCEDURE — 84100 ASSAY OF PHOSPHORUS: CPT

## 2017-09-08 PROCEDURE — 86900 BLOOD TYPING SEROLOGIC ABO: CPT

## 2017-09-08 PROCEDURE — 86850 RBC ANTIBODY SCREEN: CPT

## 2017-09-08 PROCEDURE — 93005 ELECTROCARDIOGRAM TRACING: CPT

## 2017-09-08 PROCEDURE — 85027 COMPLETE CBC AUTOMATED: CPT

## 2017-09-08 PROCEDURE — 84295 ASSAY OF SERUM SODIUM: CPT

## 2017-09-08 RX ORDER — TAMSULOSIN HYDROCHLORIDE 0.4 MG/1
1 CAPSULE ORAL
Qty: 30 | Refills: 0 | OUTPATIENT
Start: 2017-09-08 | End: 2017-10-08

## 2017-09-08 RX ORDER — LOSARTAN POTASSIUM 100 MG/1
25 TABLET, FILM COATED ORAL DAILY
Qty: 0 | Refills: 0 | Status: DISCONTINUED | OUTPATIENT
Start: 2017-09-08 | End: 2017-09-08

## 2017-09-08 RX ORDER — HYDROMORPHONE HYDROCHLORIDE 2 MG/ML
1 INJECTION INTRAMUSCULAR; INTRAVENOUS; SUBCUTANEOUS
Qty: 20 | Refills: 0 | OUTPATIENT
Start: 2017-09-08

## 2017-09-08 RX ORDER — PANTOPRAZOLE SODIUM 20 MG/1
40 TABLET, DELAYED RELEASE ORAL
Qty: 0 | Refills: 0 | Status: DISCONTINUED | OUTPATIENT
Start: 2017-09-08 | End: 2017-09-08

## 2017-09-08 RX ADMIN — HEPARIN SODIUM 5000 UNIT(S): 5000 INJECTION INTRAVENOUS; SUBCUTANEOUS at 09:03

## 2017-09-08 RX ADMIN — CARVEDILOL PHOSPHATE 25 MILLIGRAM(S): 80 CAPSULE, EXTENDED RELEASE ORAL at 05:52

## 2017-09-08 RX ADMIN — PANTOPRAZOLE SODIUM 40 MILLIGRAM(S): 20 TABLET, DELAYED RELEASE ORAL at 09:02

## 2017-09-08 RX ADMIN — HYDROMORPHONE HYDROCHLORIDE 2 MILLIGRAM(S): 2 INJECTION INTRAMUSCULAR; INTRAVENOUS; SUBCUTANEOUS at 01:54

## 2017-09-08 RX ADMIN — HYDROMORPHONE HYDROCHLORIDE 2 MILLIGRAM(S): 2 INJECTION INTRAMUSCULAR; INTRAVENOUS; SUBCUTANEOUS at 12:53

## 2017-09-08 RX ADMIN — HYDROMORPHONE HYDROCHLORIDE 2 MILLIGRAM(S): 2 INJECTION INTRAMUSCULAR; INTRAVENOUS; SUBCUTANEOUS at 02:24

## 2017-09-08 RX ADMIN — LOSARTAN POTASSIUM 25 MILLIGRAM(S): 100 TABLET, FILM COATED ORAL at 09:03

## 2017-09-08 RX ADMIN — Medication 63.75 MILLIMOLE(S): at 12:54

## 2017-09-08 RX ADMIN — Medication 20 MILLIGRAM(S): at 05:52

## 2017-09-08 NOTE — DISCHARGE NOTE ADULT - PLAN OF CARE
wound healing Activity- No heavy lifting or straining over 15 lbs for the next two weeks;  Driving- Please do not drive until your pain is well controlled and you do not need to take pain medications.  You may shower-Do not submerge or scrub incision sites.  Please pat dry incisions/dressings.

## 2017-09-08 NOTE — DISCHARGE NOTE ADULT - SECONDARY DIAGNOSIS.
Coronary artery disease involving native coronary artery of native heart, angina presence unspecified Aplastic anemia

## 2017-09-08 NOTE — PROGRESS NOTE ADULT - ASSESSMENT
82M on POD3 s/p diagnostic laparoscopy and small bowel resection, progressing well.    - OOB  - f/u GI function  - Continue on Flomax  - Oral pain meds  - LRD  - Home cardiac meds.  - SQH DVT prophylaxis  - d/c to home if tolerating LRD and having adequate urine output  - d/c mauricio prior to discharge

## 2017-09-08 NOTE — DISCHARGE NOTE ADULT - MEDICATION SUMMARY - MEDICATIONS TO TAKE
I will START or STAY ON the medications listed below when I get home from the hospital:    Aspirin Low Dose 81 mg oral delayed release tablet  -- 1 tab(s) by mouth once a day  -- Indication: For CAD (coronary artery disease)    HYDROmorphone 2 mg oral tablet  -- 1 tab(s) by mouth every 4 hours, As needed, Moderate Pain (4 - 6) MDD:6  -- Indication: For Pain    losartan 25 mg oral tablet  -- 1 tab(s) by mouth 2 times a day  -- Indication: For HTN    tamsulosin 0.4 mg oral capsule  -- 1 cap(s) by mouth once a day (at bedtime)  -- Indication: For BPH    Xanax 0.5 mg oral tablet  --  by mouth , As Needed  -- Indication: For Anxiety    carvedilol 25 mg oral tablet  -- 1 tab(s) by mouth 2 times a day  -- Indication: For HTN    furosemide 20 mg oral tablet  -- 1 tab(s) by mouth 2 times a day  -- Indication: For Diuretic    Promacta 50 mg oral tablet  -- 1 tab(s) by mouth 3 times a day at bedtime  -- Indication: For Aplastic anemia    omeprazole 40 mg oral delayed release capsule  -- 1 cap(s) by mouth once a day, As Needed  -- Indication: For reflux I will START or STAY ON the medications listed below when I get home from the hospital:    Aspirin Low Dose 81 mg oral delayed release tablet  -- 1 tab(s) by mouth once a day  -- Indication: For CAD (coronary artery disease)    HYDROmorphone 2 mg oral tablet  -- 1 tab(s) by mouth every 4 hours, As needed, Moderate Pain (4 - 6) MDD:6  -- Indication: For Pain control    losartan 25 mg oral tablet  -- 1 tab(s) by mouth 2 times a day  -- Indication: For HTN    tamsulosin 0.4 mg oral capsule  -- 1 cap(s) by mouth once a day (at bedtime)  -- Indication: For BPH    Xanax 0.5 mg oral tablet  --  by mouth , As Needed  -- Indication: For Anxiety    carvedilol 25 mg oral tablet  -- 1 tab(s) by mouth 2 times a day  -- Indication: For HTN    furosemide 20 mg oral tablet  -- 1 tab(s) by mouth 2 times a day  -- Indication: For Diuretic    Promacta 50 mg oral tablet  -- 1 tab(s) by mouth 3 times a day at bedtime  -- Indication: For Aplastic anemia    omeprazole 40 mg oral delayed release capsule  -- 1 cap(s) by mouth once a day, As Needed  -- Indication: For Reflux

## 2017-09-08 NOTE — DISCHARGE NOTE ADULT - NS AS ACTIVITY OBS
Do not make important decisions/Do not drive or operate machinery/Showering allowed/No Heavy lifting/straining

## 2017-09-08 NOTE — DISCHARGE NOTE ADULT - HOSPITAL COURSE
83 y/o male with pmhx of CAD s/p PCI with GREYSON x 3 in 2008, cardiomyopathy last EF 20-25% s/p ICD placement in 2014, fall in 2014 with subdural hematoma s/p evacuation at [facility], chronic afib - no coumadin 2/2 severe aplastic anemia with thrombocytopenia, (recent plts 220). Pt states his H&H began to decline with stable plts - was sent for colonoscopy/capsule endoscopy with + small bowel lesion suspicious for malignancy.   On 9/5/17 patient underwent a Diagnostic laparoscopy and small bowel resection. The patient tolerated the procedure well. There were no complications. The patient was extubated in the OR and transferred to the PACU in stable condition and transferred to a surgical floor. The patient had daily wound care and was seen by physical therapy which recommended discharge to home.  Once bowel function returned, diet was advanced as tolerated. The patient's pain was controlled by IV pain medications and then by PO pain medications. The patient was placed back on home medications.  Pt was seen by Merle while in patient who recommend to cont current meds and to follow up as an Outpatient.  Betancur was reinserted for urinary retention and low urine output. 280cc of urine on return after reinsertion.  POD # 3, At the time of discharge, the patient was hemodynamically stable, was tolerating PO diet, was voiding urine and passing stool, was ambulating, and was comfortable with adequate pain control. The patient was instructed to follow up with Dr. Felix within 1-2 weeks after discharge from the hospital. The patient felt comfortable with discharge. The patient was discharged to home. The patient had no other issues.

## 2017-09-08 NOTE — DISCHARGE NOTE ADULT - ADDITIONAL INSTRUCTIONS
Please discuss your surgical pathology results with your surgeon at your first outpatient follow-up appointment.

## 2017-09-08 NOTE — PROVIDER CONTACT NOTE (OTHER) - SITUATION
discrepancy noted in syringe vs what was noted in reservoir volume during change of shift. notified management and supervisors. notified MD. nurse education, and pain services,

## 2017-09-08 NOTE — PROVIDER CONTACT NOTE (OTHER) - ACTION/TREATMENT ORDERED:
pain services took down PCA dilaudid pump and brought it down to pharmacy.
no further intervention was required at this time. will continue to monitor.
roblero reinserted for urinary retention and low urine output. 280cc of urine on return after reinsertion. will continue to monitor output
EKG is being completed. pending the team to see the pt. awaiting for further instruction.

## 2017-09-08 NOTE — DISCHARGE NOTE ADULT - CARE PLAN
Principal Discharge DX:	Small bowel lesion  Goal:	wound healing  Instructions for follow-up, activity and diet:	Activity- No heavy lifting or straining over 15 lbs for the next two weeks;  Driving- Please do not drive until your pain is well controlled and you do not need to take pain medications.  You may shower-Do not submerge or scrub incision sites.  Please pat dry incisions/dressings.  Secondary Diagnosis:	Coronary artery disease involving native coronary artery of native heart, angina presence unspecified  Secondary Diagnosis:	Aplastic anemia

## 2017-09-08 NOTE — PROGRESS NOTE ADULT - SUBJECTIVE AND OBJECTIVE BOX
RED SURGERY PROGRESS NOTE    POST OPERATIVE DAY #: 3    PROCEDURE: Diagnostic laparoscopy small bowel resection    SUBJECTIVE: PCA non functional yesterday, switched to oral pain meds. Tolerating clear liquid diet. Had flatus but no BM.     Vital Signs Last 24 Hrs  T(C): 36.9 (08 Sep 2017 09:30), Max: 36.9 (08 Sep 2017 09:30)  T(F): 98.5 (08 Sep 2017 09:30), Max: 98.5 (08 Sep 2017 09:30)  HR: 91 (08 Sep 2017 09:30) (91 - 98)  BP: 120/78 (08 Sep 2017 09:30) (119/77 - 131/81)  BP(mean): --  RR: 18 (08 Sep 2017 09:30) (16 - 18)  SpO2: 99% (08 Sep 2017 09:30) (97% - 99%)    Physical Exam:  Gen: Laying in bed, NAD, alert and oriented   Resp: Unlabored breathing  Abd: soft, NT, mildly distended, midline incision with mauricio and gauze changed over it, OpSites off  Extremities: Grossly symmetric, wwp, no edema appreciated    I&O's Summary    07 Sep 2017 07:01  -  08 Sep 2017 07:00  --------------------------------------------------------  IN: 2620 mL / OUT: 1775 mL / NET: 845 mL    08 Sep 2017 07:01  -  08 Sep 2017 12:07  --------------------------------------------------------  IN: 340 mL / OUT: 995 mL / NET: -655 mL      I&O's Detail    07 Sep 2017 07:01  -  08 Sep 2017 07:00  --------------------------------------------------------  IN:    dextrose 5% + sodium chloride 0.45%.: 1800 mL    Oral Fluid: 820 mL  Total IN: 2620 mL    OUT:    Indwelling Catheter - Urethral: 1775 mL  Total OUT: 1775 mL    Total NET: 845 mL      08 Sep 2017 07:01  -  08 Sep 2017 12:07  --------------------------------------------------------  IN:    Oral Fluid: 340 mL  Total IN: 340 mL    OUT:    Indwelling Catheter - Urethral: 710 mL    Voided: 285 mL  Total OUT: 995 mL    Total NET: -655 mL          MEDICATIONS  (STANDING):  heparin  Injectable 5000 Unit(s) SubCutaneous every 8 hours  eltrombopag 150 milliGRAM(s) Oral at bedtime  furosemide    Tablet 20 milliGRAM(s) Oral two times a day  tamsulosin 0.4 milliGRAM(s) Oral at bedtime  carvedilol 25 milliGRAM(s) Oral every 12 hours  sodium phosphate IVPB 15 milliMole(s) IV Intermittent once  losartan 25 milliGRAM(s) Oral daily  pantoprazole    Tablet 40 milliGRAM(s) Oral before breakfast    MEDICATIONS  (PRN):  HYDROmorphone   Tablet 2 milliGRAM(s) Oral every 4 hours PRN Moderate Pain (4 - 6)      LABS:                        14.6   5.7   )-----------( 184      ( 08 Sep 2017 06:54 )             44.5     09-08    137  |  98  |  23  ----------------------------<  122<H>  4.1   |  27  |  1.19    Ca    9.6      08 Sep 2017 06:54  Phos  2.2     09-08  Mg     2.0     09-08            RADIOLOGY & ADDITIONAL STUDIES:

## 2017-09-08 NOTE — DISCHARGE NOTE ADULT - PATIENT PORTAL LINK FT
“You can access the Follow[employer] Patient Portal, offered by [facility], by registering with the following website: [email or web address]”

## 2017-09-08 NOTE — PROVIDER CONTACT NOTE (OTHER) - BACKGROUND
came to the conclusion that it would be best to take down PCA dilaudid pump. notified MD Stone regarding issue.

## 2017-09-11 LAB — SURGICAL PATHOLOGY STUDY: SIGNIFICANT CHANGE UP

## 2017-09-14 ENCOUNTER — FORM ENCOUNTER (OUTPATIENT)
Age: 82
End: 2017-09-14

## 2017-09-15 ENCOUNTER — APPOINTMENT (OUTPATIENT)
Dept: ULTRASOUND IMAGING | Facility: CLINIC | Age: 82
End: 2017-09-15
Payer: MEDICARE

## 2017-09-15 ENCOUNTER — OUTPATIENT (OUTPATIENT)
Dept: OUTPATIENT SERVICES | Facility: HOSPITAL | Age: 82
LOS: 1 days | End: 2017-09-15
Payer: MEDICARE

## 2017-09-15 DIAGNOSIS — Z00.8 ENCOUNTER FOR OTHER GENERAL EXAMINATION: ICD-10-CM

## 2017-09-15 DIAGNOSIS — Z98.890 OTHER SPECIFIED POSTPROCEDURAL STATES: Chronic | ICD-10-CM

## 2017-09-15 DIAGNOSIS — Z95.810 PRESENCE OF AUTOMATIC (IMPLANTABLE) CARDIAC DEFIBRILLATOR: Chronic | ICD-10-CM

## 2017-09-15 PROCEDURE — 93970 EXTREMITY STUDY: CPT | Mod: 26

## 2017-09-15 PROCEDURE — 93970 EXTREMITY STUDY: CPT

## 2017-09-19 ENCOUNTER — APPOINTMENT (OUTPATIENT)
Dept: HEMATOLOGY ONCOLOGY | Facility: CLINIC | Age: 82
End: 2017-09-19

## 2017-09-20 ENCOUNTER — APPOINTMENT (OUTPATIENT)
Dept: COLORECTAL SURGERY | Facility: CLINIC | Age: 82
End: 2017-09-20
Payer: MEDICARE

## 2017-09-20 VITALS — TEMPERATURE: 98.1 F

## 2017-09-20 PROCEDURE — 99024 POSTOP FOLLOW-UP VISIT: CPT

## 2017-10-04 ENCOUNTER — OUTPATIENT (OUTPATIENT)
Dept: OUTPATIENT SERVICES | Facility: HOSPITAL | Age: 82
LOS: 1 days | Discharge: HOME | End: 2017-10-04

## 2017-10-04 DIAGNOSIS — Z95.810 PRESENCE OF AUTOMATIC (IMPLANTABLE) CARDIAC DEFIBRILLATOR: Chronic | ICD-10-CM

## 2017-10-04 DIAGNOSIS — Z98.890 OTHER SPECIFIED POSTPROCEDURAL STATES: Chronic | ICD-10-CM

## 2017-10-04 DIAGNOSIS — D59.5: ICD-10-CM

## 2017-10-05 ENCOUNTER — APPOINTMENT (OUTPATIENT)
Dept: HEMATOLOGY ONCOLOGY | Facility: CLINIC | Age: 82
End: 2017-10-05
Payer: MEDICARE

## 2017-10-05 VITALS
RESPIRATION RATE: 16 BRPM | BODY MASS INDEX: 30.59 KG/M2 | OXYGEN SATURATION: 99 % | SYSTOLIC BLOOD PRESSURE: 110 MMHG | DIASTOLIC BLOOD PRESSURE: 70 MMHG | TEMPERATURE: 97.7 F | WEIGHT: 244.71 LBS | HEART RATE: 89 BPM

## 2017-10-05 DIAGNOSIS — Z86.79 PERSONAL HISTORY OF OTHER DISEASES OF THE CIRCULATORY SYSTEM: ICD-10-CM

## 2017-10-05 PROCEDURE — 99205 OFFICE O/P NEW HI 60 MIN: CPT

## 2017-10-05 PROCEDURE — 99215 OFFICE O/P EST HI 40 MIN: CPT

## 2017-10-09 DIAGNOSIS — D61.9 APLASTIC ANEMIA, UNSPECIFIED: ICD-10-CM

## 2017-10-10 ENCOUNTER — OUTPATIENT (OUTPATIENT)
Dept: OUTPATIENT SERVICES | Facility: HOSPITAL | Age: 82
LOS: 1 days | End: 2017-10-10
Payer: MEDICARE

## 2017-10-10 ENCOUNTER — RESULT REVIEW (OUTPATIENT)
Age: 82
End: 2017-10-10

## 2017-10-10 ENCOUNTER — APPOINTMENT (OUTPATIENT)
Dept: HEMATOLOGY ONCOLOGY | Facility: CLINIC | Age: 82
End: 2017-10-10
Payer: MEDICARE

## 2017-10-10 VITALS
RESPIRATION RATE: 16 BRPM | BODY MASS INDEX: 31.41 KG/M2 | HEART RATE: 90 BPM | DIASTOLIC BLOOD PRESSURE: 70 MMHG | OXYGEN SATURATION: 100 % | SYSTOLIC BLOOD PRESSURE: 110 MMHG | TEMPERATURE: 97.7 F | WEIGHT: 251.3 LBS

## 2017-10-10 DIAGNOSIS — Z95.810 PRESENCE OF AUTOMATIC (IMPLANTABLE) CARDIAC DEFIBRILLATOR: Chronic | ICD-10-CM

## 2017-10-10 DIAGNOSIS — D61.9 APLASTIC ANEMIA, UNSPECIFIED: ICD-10-CM

## 2017-10-10 DIAGNOSIS — Z98.890 OTHER SPECIFIED POSTPROCEDURAL STATES: Chronic | ICD-10-CM

## 2017-10-10 LAB
BASOPHILS # BLD AUTO: 0 K/UL — SIGNIFICANT CHANGE UP (ref 0–0.2)
BASOPHILS NFR BLD AUTO: 0.8 % — SIGNIFICANT CHANGE UP (ref 0–2)
BLD GP AB SCN SERPL QL: NEGATIVE — SIGNIFICANT CHANGE UP
EOSINOPHIL # BLD AUTO: 0.1 K/UL — SIGNIFICANT CHANGE UP (ref 0–0.5)
EOSINOPHIL NFR BLD AUTO: 3.7 % — SIGNIFICANT CHANGE UP (ref 0–6)
HCT VFR BLD CALC: 19.1 % — CRITICAL LOW (ref 39–50)
HGB BLD-MCNC: 6.5 G/DL — CRITICAL LOW (ref 13–17)
LYMPHOCYTES # BLD AUTO: 0.7 K/UL — LOW (ref 1–3.3)
LYMPHOCYTES # BLD AUTO: 32.7 % — SIGNIFICANT CHANGE UP (ref 13–44)
MCHC RBC-ENTMCNC: 27.8 PG — SIGNIFICANT CHANGE UP (ref 27–34)
MCHC RBC-ENTMCNC: 33.9 G/DL — SIGNIFICANT CHANGE UP (ref 32–36)
MCV RBC AUTO: 81.9 FL — SIGNIFICANT CHANGE UP (ref 80–100)
MONOCYTES # BLD AUTO: 0.3 K/UL — SIGNIFICANT CHANGE UP (ref 0–0.9)
MONOCYTES NFR BLD AUTO: 11.5 % — SIGNIFICANT CHANGE UP (ref 2–14)
NEUTROPHILS # BLD AUTO: 1.2 K/UL — LOW (ref 1.8–7.4)
NEUTROPHILS NFR BLD AUTO: 51.2 % — SIGNIFICANT CHANGE UP (ref 43–77)
OB PNL STL: NEGATIVE — SIGNIFICANT CHANGE UP
PLAT MORPH BLD: NORMAL — SIGNIFICANT CHANGE UP
PLATELET # BLD AUTO: 138 K/UL — LOW (ref 150–400)
RBC # BLD: 2.33 M/UL — LOW (ref 4.2–5.8)
RBC # FLD: 24 % — HIGH (ref 10.3–14.5)
RBC BLD AUTO: SIGNIFICANT CHANGE UP
RETICS #: 36 K/UL — SIGNIFICANT CHANGE UP (ref 25–125)
RETICS/RBC NFR: 1.5 % — SIGNIFICANT CHANGE UP (ref 0.5–2.5)
RH IG SCN BLD-IMP: POSITIVE — SIGNIFICANT CHANGE UP
WBC # BLD: 2.3 K/UL — LOW (ref 3.8–10.5)
WBC # FLD AUTO: 2.3 K/UL — LOW (ref 3.8–10.5)

## 2017-10-10 PROCEDURE — 86923 COMPATIBILITY TEST ELECTRIC: CPT

## 2017-10-10 PROCEDURE — 86850 RBC ANTIBODY SCREEN: CPT

## 2017-10-10 PROCEDURE — 86901 BLOOD TYPING SEROLOGIC RH(D): CPT

## 2017-10-10 PROCEDURE — 86900 BLOOD TYPING SEROLOGIC ABO: CPT

## 2017-10-10 PROCEDURE — 99214 OFFICE O/P EST MOD 30 MIN: CPT

## 2017-10-10 RX ORDER — IRON/IRON ASP GLY/FA/MV-MIN 38 125-25-1MG
TABLET ORAL
Refills: 0 | Status: DISCONTINUED | COMMUNITY
End: 2017-10-10

## 2017-10-11 ENCOUNTER — APPOINTMENT (OUTPATIENT)
Dept: INFUSION THERAPY | Facility: HOSPITAL | Age: 82
End: 2017-10-11

## 2017-10-12 DIAGNOSIS — Z51.89 ENCOUNTER FOR OTHER SPECIFIED AFTERCARE: ICD-10-CM

## 2017-10-18 ENCOUNTER — APPOINTMENT (OUTPATIENT)
Dept: COLORECTAL SURGERY | Facility: CLINIC | Age: 82
End: 2017-10-18
Payer: MEDICARE

## 2017-10-18 PROCEDURE — 99024 POSTOP FOLLOW-UP VISIT: CPT

## 2017-10-26 ENCOUNTER — APPOINTMENT (OUTPATIENT)
Dept: HEMATOLOGY ONCOLOGY | Facility: CLINIC | Age: 82
End: 2017-10-26
Payer: MEDICARE

## 2017-10-26 ENCOUNTER — RESULT REVIEW (OUTPATIENT)
Age: 82
End: 2017-10-26

## 2017-10-26 ENCOUNTER — LABORATORY RESULT (OUTPATIENT)
Age: 82
End: 2017-10-26

## 2017-10-26 VITALS
SYSTOLIC BLOOD PRESSURE: 120 MMHG | BODY MASS INDEX: 31.14 KG/M2 | WEIGHT: 249.12 LBS | TEMPERATURE: 98.2 F | RESPIRATION RATE: 16 BRPM | OXYGEN SATURATION: 99 % | DIASTOLIC BLOOD PRESSURE: 78 MMHG | HEART RATE: 100 BPM

## 2017-10-26 LAB
BASOPHILS # BLD AUTO: 0 K/UL — SIGNIFICANT CHANGE UP (ref 0–0.2)
BASOPHILS NFR BLD AUTO: 1.2 % — SIGNIFICANT CHANGE UP (ref 0–2)
EOSINOPHIL # BLD AUTO: 0.1 K/UL — SIGNIFICANT CHANGE UP (ref 0–0.5)
EOSINOPHIL NFR BLD AUTO: 2.6 % — SIGNIFICANT CHANGE UP (ref 0–6)
HCT VFR BLD CALC: 33.8 % — LOW (ref 39–50)
HGB BLD-MCNC: 11 G/DL — LOW (ref 13–17)
LYMPHOCYTES # BLD AUTO: 0.9 K/UL — LOW (ref 1–3.3)
LYMPHOCYTES # BLD AUTO: 28.8 % — SIGNIFICANT CHANGE UP (ref 13–44)
MCHC RBC-ENTMCNC: 30.2 PG — SIGNIFICANT CHANGE UP (ref 27–34)
MCHC RBC-ENTMCNC: 32.7 G/DL — SIGNIFICANT CHANGE UP (ref 32–36)
MCV RBC AUTO: 92.3 FL — SIGNIFICANT CHANGE UP (ref 80–100)
MONOCYTES # BLD AUTO: 0.3 K/UL — SIGNIFICANT CHANGE UP (ref 0–0.9)
MONOCYTES NFR BLD AUTO: 11.2 % — SIGNIFICANT CHANGE UP (ref 2–14)
NEUTROPHILS # BLD AUTO: 1.7 K/UL — LOW (ref 1.8–7.4)
NEUTROPHILS NFR BLD AUTO: 56.2 % — SIGNIFICANT CHANGE UP (ref 43–77)
PLATELET # BLD AUTO: 116 K/UL — LOW (ref 150–400)
RBC # BLD: 3.66 M/UL — LOW (ref 4.2–5.8)
RBC # FLD: 23.6 % — HIGH (ref 10.3–14.5)
WBC # BLD: 3 K/UL — LOW (ref 3.8–10.5)
WBC # FLD AUTO: 3 K/UL — LOW (ref 3.8–10.5)

## 2017-10-26 PROCEDURE — 99213 OFFICE O/P EST LOW 20 MIN: CPT

## 2017-10-26 RX ORDER — NYSTATIN 100000 1/G
100000 POWDER TOPICAL TWICE DAILY
Qty: 60 | Refills: 2 | Status: DISCONTINUED | COMMUNITY
Start: 2017-09-20 | End: 2017-10-26

## 2017-10-31 LAB
ABR COMMENT: NORMAL
ALBUMIN SERPL ELPH-MCNC: 4.1 G/DL
ALP BLD-CCNC: 72 U/L
ALT SERPL-CCNC: 13 U/L
ANION GAP SERPL CALC-SCNC: 13 MMOL/L
AST SERPL-CCNC: 19 U/L
BILIRUB INDIRECT SERPL-MCNC: 0.3 MG/DL
BILIRUB SERPL-MCNC: 0.5 MG/DL
BUN SERPL-MCNC: 31 MG/DL
CALCIUM SERPL-MCNC: 8.8 MG/DL
CHLORIDE SERPL-SCNC: 103 MMOL/L
CO2 SERPL-SCNC: 26 MMOL/L
CREAT SERPL-MCNC: 1.39 MG/DL
FERRITIN SERPL-MCNC: 104 NG/ML
GLUCOSE SERPL-MCNC: 102 MG/DL
HAPTOGLOB SERPL-MCNC: 126 MG/DL
IRON SATN MFR SERPL: 58 %
IRON SERPL-MCNC: 264 UG/DL
LDH SERPL-CCNC: 180 U/L
PNH GRANULOCYTES: 0 %
PNH MONOCYTES: 0 %
PNH RBC - COMPLETE: 0 %
PNH RBC - PARTIAL: 0.01 %
POTASSIUM SERPL-SCNC: 4.8 MMOL/L
PROT SERPL-MCNC: 6.8 G/DL
SODIUM SERPL-SCNC: 142 MMOL/L
TIBC SERPL-MCNC: 459 UG/DL
UIBC SERPL-MCNC: 195 UG/DL

## 2017-11-14 ENCOUNTER — OUTPATIENT (OUTPATIENT)
Dept: OUTPATIENT SERVICES | Facility: HOSPITAL | Age: 82
LOS: 1 days | Discharge: HOME | End: 2017-11-14

## 2017-11-14 DIAGNOSIS — Z98.890 OTHER SPECIFIED POSTPROCEDURAL STATES: Chronic | ICD-10-CM

## 2017-11-14 DIAGNOSIS — Z95.810 PRESENCE OF AUTOMATIC (IMPLANTABLE) CARDIAC DEFIBRILLATOR: Chronic | ICD-10-CM

## 2017-11-14 DIAGNOSIS — D59.5: ICD-10-CM

## 2017-11-17 ENCOUNTER — RESULT REVIEW (OUTPATIENT)
Age: 82
End: 2017-11-17

## 2017-11-17 ENCOUNTER — APPOINTMENT (OUTPATIENT)
Dept: HEMATOLOGY ONCOLOGY | Facility: CLINIC | Age: 82
End: 2017-11-17
Payer: MEDICARE

## 2017-11-17 VITALS
DIASTOLIC BLOOD PRESSURE: 81 MMHG | WEIGHT: 244.71 LBS | HEART RATE: 95 BPM | SYSTOLIC BLOOD PRESSURE: 126 MMHG | RESPIRATION RATE: 16 BRPM | OXYGEN SATURATION: 98 % | TEMPERATURE: 97.3 F | BODY MASS INDEX: 30.59 KG/M2

## 2017-11-17 LAB
BASOPHILS # BLD AUTO: 0.1 K/UL — SIGNIFICANT CHANGE UP (ref 0–0.2)
BASOPHILS NFR BLD AUTO: 1.7 % — SIGNIFICANT CHANGE UP (ref 0–2)
EOSINOPHIL # BLD AUTO: 0.1 K/UL — SIGNIFICANT CHANGE UP (ref 0–0.5)
EOSINOPHIL NFR BLD AUTO: 2.9 % — SIGNIFICANT CHANGE UP (ref 0–6)
HCT VFR BLD CALC: 40.1 % — SIGNIFICANT CHANGE UP (ref 39–50)
HGB BLD-MCNC: 13.2 G/DL — SIGNIFICANT CHANGE UP (ref 13–17)
LYMPHOCYTES # BLD AUTO: 0.9 K/UL — LOW (ref 1–3.3)
LYMPHOCYTES # BLD AUTO: 29.1 % — SIGNIFICANT CHANGE UP (ref 13–44)
MCHC RBC-ENTMCNC: 31.4 PG — SIGNIFICANT CHANGE UP (ref 27–34)
MCHC RBC-ENTMCNC: 32.8 G/DL — SIGNIFICANT CHANGE UP (ref 32–36)
MCV RBC AUTO: 95.6 FL — SIGNIFICANT CHANGE UP (ref 80–100)
MONOCYTES # BLD AUTO: 0.4 K/UL — SIGNIFICANT CHANGE UP (ref 0–0.9)
MONOCYTES NFR BLD AUTO: 11.4 % — SIGNIFICANT CHANGE UP (ref 2–14)
NEUTROPHILS # BLD AUTO: 1.7 K/UL — LOW (ref 1.8–7.4)
NEUTROPHILS NFR BLD AUTO: 54.8 % — SIGNIFICANT CHANGE UP (ref 43–77)
PLATELET # BLD AUTO: 148 K/UL — LOW (ref 150–400)
RBC # BLD: 4.2 M/UL — SIGNIFICANT CHANGE UP (ref 4.2–5.8)
RBC # FLD: 20.1 % — HIGH (ref 10.3–14.5)
WBC # BLD: 3.2 K/UL — LOW (ref 3.8–10.5)
WBC # FLD AUTO: 3.2 K/UL — LOW (ref 3.8–10.5)

## 2017-11-17 PROCEDURE — 99214 OFFICE O/P EST MOD 30 MIN: CPT

## 2017-11-17 RX ORDER — IRON/IRON ASP GLY/FA/MV-MIN 38 125-25-1MG
TABLET ORAL
Refills: 0 | Status: DISCONTINUED | COMMUNITY
End: 2017-11-17

## 2017-12-13 ENCOUNTER — OUTPATIENT (OUTPATIENT)
Dept: OUTPATIENT SERVICES | Facility: HOSPITAL | Age: 82
LOS: 1 days | Discharge: HOME | End: 2017-12-13

## 2017-12-13 DIAGNOSIS — Z95.810 PRESENCE OF AUTOMATIC (IMPLANTABLE) CARDIAC DEFIBRILLATOR: Chronic | ICD-10-CM

## 2017-12-13 DIAGNOSIS — D61.9 APLASTIC ANEMIA, UNSPECIFIED: ICD-10-CM

## 2017-12-13 DIAGNOSIS — D59.5: ICD-10-CM

## 2017-12-13 DIAGNOSIS — Z98.890 OTHER SPECIFIED POSTPROCEDURAL STATES: Chronic | ICD-10-CM

## 2017-12-19 ENCOUNTER — RESULT REVIEW (OUTPATIENT)
Age: 82
End: 2017-12-19

## 2017-12-19 ENCOUNTER — APPOINTMENT (OUTPATIENT)
Dept: HEMATOLOGY ONCOLOGY | Facility: CLINIC | Age: 82
End: 2017-12-19
Payer: MEDICARE

## 2017-12-19 VITALS
SYSTOLIC BLOOD PRESSURE: 120 MMHG | BODY MASS INDEX: 29.62 KG/M2 | HEART RATE: 88 BPM | OXYGEN SATURATION: 98 % | WEIGHT: 236.97 LBS | TEMPERATURE: 97.8 F | RESPIRATION RATE: 16 BRPM | DIASTOLIC BLOOD PRESSURE: 80 MMHG

## 2017-12-19 LAB
BASOPHILS # BLD AUTO: 0 K/UL — SIGNIFICANT CHANGE UP (ref 0–0.2)
BASOPHILS NFR BLD AUTO: 1.1 % — SIGNIFICANT CHANGE UP (ref 0–2)
EOSINOPHIL # BLD AUTO: 0.1 K/UL — SIGNIFICANT CHANGE UP (ref 0–0.5)
EOSINOPHIL NFR BLD AUTO: 2.3 % — SIGNIFICANT CHANGE UP (ref 0–6)
HCT VFR BLD CALC: 41 % — SIGNIFICANT CHANGE UP (ref 39–50)
HGB BLD-MCNC: 14 G/DL — SIGNIFICANT CHANGE UP (ref 13–17)
LYMPHOCYTES # BLD AUTO: 1 K/UL — SIGNIFICANT CHANGE UP (ref 1–3.3)
LYMPHOCYTES # BLD AUTO: 27.6 % — SIGNIFICANT CHANGE UP (ref 13–44)
MCHC RBC-ENTMCNC: 32.1 PG — SIGNIFICANT CHANGE UP (ref 27–34)
MCHC RBC-ENTMCNC: 34.1 G/DL — SIGNIFICANT CHANGE UP (ref 32–36)
MCV RBC AUTO: 93.9 FL — SIGNIFICANT CHANGE UP (ref 80–100)
MONOCYTES # BLD AUTO: 0.4 K/UL — SIGNIFICANT CHANGE UP (ref 0–0.9)
MONOCYTES NFR BLD AUTO: 10.6 % — SIGNIFICANT CHANGE UP (ref 2–14)
NEUTROPHILS # BLD AUTO: 2 K/UL — SIGNIFICANT CHANGE UP (ref 1.8–7.4)
NEUTROPHILS NFR BLD AUTO: 58.4 % — SIGNIFICANT CHANGE UP (ref 43–77)
PLATELET # BLD AUTO: 136 K/UL — LOW (ref 150–400)
RBC # BLD: 4.36 M/UL — SIGNIFICANT CHANGE UP (ref 4.2–5.8)
RBC # FLD: 18.4 % — HIGH (ref 10.3–14.5)
WBC # BLD: 3.5 K/UL — LOW (ref 3.8–10.5)
WBC # FLD AUTO: 3.5 K/UL — LOW (ref 3.8–10.5)

## 2017-12-19 PROCEDURE — 99214 OFFICE O/P EST MOD 30 MIN: CPT

## 2018-01-12 ENCOUNTER — RESULT REVIEW (OUTPATIENT)
Age: 83
End: 2018-01-12

## 2018-01-12 ENCOUNTER — APPOINTMENT (OUTPATIENT)
Dept: HEMATOLOGY ONCOLOGY | Facility: CLINIC | Age: 83
End: 2018-01-12
Payer: MEDICARE

## 2018-01-12 VITALS
RESPIRATION RATE: 16 BRPM | HEART RATE: 80 BPM | WEIGHT: 239.2 LBS | SYSTOLIC BLOOD PRESSURE: 128 MMHG | TEMPERATURE: 98.4 F | OXYGEN SATURATION: 96 % | BODY MASS INDEX: 29.9 KG/M2 | DIASTOLIC BLOOD PRESSURE: 76 MMHG

## 2018-01-12 LAB
BASOPHILS # BLD AUTO: 0 K/UL — SIGNIFICANT CHANGE UP (ref 0–0.2)
BASOPHILS NFR BLD AUTO: 0.8 % — SIGNIFICANT CHANGE UP (ref 0–2)
EOSINOPHIL # BLD AUTO: 0.1 K/UL — SIGNIFICANT CHANGE UP (ref 0–0.5)
EOSINOPHIL NFR BLD AUTO: 1.6 % — SIGNIFICANT CHANGE UP (ref 0–6)
HCT VFR BLD CALC: 41.4 % — SIGNIFICANT CHANGE UP (ref 39–50)
HGB BLD-MCNC: 14.2 G/DL — SIGNIFICANT CHANGE UP (ref 13–17)
LYMPHOCYTES # BLD AUTO: 1 K/UL — SIGNIFICANT CHANGE UP (ref 1–3.3)
LYMPHOCYTES # BLD AUTO: 28.2 % — SIGNIFICANT CHANGE UP (ref 13–44)
MCHC RBC-ENTMCNC: 32.1 PG — SIGNIFICANT CHANGE UP (ref 27–34)
MCHC RBC-ENTMCNC: 34.2 G/DL — SIGNIFICANT CHANGE UP (ref 32–36)
MCV RBC AUTO: 93.7 FL — SIGNIFICANT CHANGE UP (ref 80–100)
MONOCYTES # BLD AUTO: 0.4 K/UL — SIGNIFICANT CHANGE UP (ref 0–0.9)
MONOCYTES NFR BLD AUTO: 10.2 % — SIGNIFICANT CHANGE UP (ref 2–14)
NEUTROPHILS # BLD AUTO: 2.1 K/UL — SIGNIFICANT CHANGE UP (ref 1.8–7.4)
NEUTROPHILS NFR BLD AUTO: 59.3 % — SIGNIFICANT CHANGE UP (ref 43–77)
PLATELET # BLD AUTO: 106 K/UL — LOW (ref 150–400)
RBC # BLD: 4.42 M/UL — SIGNIFICANT CHANGE UP (ref 4.2–5.8)
RBC # FLD: 17.3 % — HIGH (ref 10.3–14.5)
WBC # BLD: 3.6 K/UL — LOW (ref 3.8–10.5)
WBC # FLD AUTO: 3.6 K/UL — LOW (ref 3.8–10.5)

## 2018-01-12 PROCEDURE — 99213 OFFICE O/P EST LOW 20 MIN: CPT

## 2018-02-13 ENCOUNTER — OUTPATIENT (OUTPATIENT)
Dept: OUTPATIENT SERVICES | Facility: HOSPITAL | Age: 83
LOS: 1 days | Discharge: HOME | End: 2018-02-13

## 2018-02-13 DIAGNOSIS — D59.5: ICD-10-CM

## 2018-02-13 DIAGNOSIS — Z95.810 PRESENCE OF AUTOMATIC (IMPLANTABLE) CARDIAC DEFIBRILLATOR: Chronic | ICD-10-CM

## 2018-02-13 DIAGNOSIS — Z98.890 OTHER SPECIFIED POSTPROCEDURAL STATES: Chronic | ICD-10-CM

## 2018-02-13 DIAGNOSIS — D61.9 APLASTIC ANEMIA, UNSPECIFIED: ICD-10-CM

## 2018-02-16 ENCOUNTER — RESULT REVIEW (OUTPATIENT)
Age: 83
End: 2018-02-16

## 2018-02-16 ENCOUNTER — APPOINTMENT (OUTPATIENT)
Dept: HEMATOLOGY ONCOLOGY | Facility: CLINIC | Age: 83
End: 2018-02-16
Payer: MEDICARE

## 2018-02-16 VITALS
HEART RATE: 91 BPM | WEIGHT: 239 LBS | RESPIRATION RATE: 16 BRPM | BODY MASS INDEX: 29.87 KG/M2 | SYSTOLIC BLOOD PRESSURE: 116 MMHG | OXYGEN SATURATION: 97 % | DIASTOLIC BLOOD PRESSURE: 75 MMHG | TEMPERATURE: 98.2 F

## 2018-02-16 LAB
ALBUMIN SERPL ELPH-MCNC: 4.2 G/DL
ALP BLD-CCNC: 92 U/L
ALT SERPL-CCNC: 18 U/L
ANION GAP SERPL CALC-SCNC: 15 MMOL/L
AST SERPL-CCNC: 22 U/L
BASOPHILS # BLD AUTO: 0.1 K/UL — SIGNIFICANT CHANGE UP (ref 0–0.2)
BASOPHILS NFR BLD AUTO: 2.1 % — HIGH (ref 0–2)
BILIRUB SERPL-MCNC: 0.8 MG/DL
BUN SERPL-MCNC: 38 MG/DL
CALCIUM SERPL-MCNC: 9.7 MG/DL
CHLORIDE SERPL-SCNC: 103 MMOL/L
CO2 SERPL-SCNC: 24 MMOL/L
CREAT SERPL-MCNC: 1.39 MG/DL
EOSINOPHIL # BLD AUTO: 0 K/UL — SIGNIFICANT CHANGE UP (ref 0–0.5)
EOSINOPHIL NFR BLD AUTO: 1 % — SIGNIFICANT CHANGE UP (ref 0–6)
GLUCOSE SERPL-MCNC: 120 MG/DL
HCT VFR BLD CALC: 41.3 % — SIGNIFICANT CHANGE UP (ref 39–50)
HGB BLD-MCNC: 14.3 G/DL — SIGNIFICANT CHANGE UP (ref 13–17)
LDH SERPL-CCNC: 179 U/L
LYMPHOCYTES # BLD AUTO: 0.9 K/UL — LOW (ref 1–3.3)
LYMPHOCYTES # BLD AUTO: 31.7 % — SIGNIFICANT CHANGE UP (ref 13–44)
MCHC RBC-ENTMCNC: 32.8 PG — SIGNIFICANT CHANGE UP (ref 27–34)
MCHC RBC-ENTMCNC: 34.7 G/DL — SIGNIFICANT CHANGE UP (ref 32–36)
MCV RBC AUTO: 94.4 FL — SIGNIFICANT CHANGE UP (ref 80–100)
MONOCYTES # BLD AUTO: 0.4 K/UL — SIGNIFICANT CHANGE UP (ref 0–0.9)
MONOCYTES NFR BLD AUTO: 13.7 % — SIGNIFICANT CHANGE UP (ref 2–14)
NEUTROPHILS # BLD AUTO: 1.5 K/UL — LOW (ref 1.8–7.4)
NEUTROPHILS NFR BLD AUTO: 51.5 % — SIGNIFICANT CHANGE UP (ref 43–77)
PLATELET # BLD AUTO: 74 K/UL — LOW (ref 150–400)
POTASSIUM SERPL-SCNC: 5 MMOL/L
PROT SERPL-MCNC: 6.9 G/DL
RBC # BLD: 4.38 M/UL — SIGNIFICANT CHANGE UP (ref 4.2–5.8)
RBC # FLD: 14.6 % — HIGH (ref 10.3–14.5)
SODIUM SERPL-SCNC: 142 MMOL/L
WBC # BLD: 3 K/UL — LOW (ref 3.8–10.5)
WBC # FLD AUTO: 3 K/UL — LOW (ref 3.8–10.5)

## 2018-02-16 PROCEDURE — 99214 OFFICE O/P EST MOD 30 MIN: CPT

## 2018-02-16 RX ORDER — ALPRAZOLAM 0.5 MG/1
0.5 TABLET ORAL
Qty: 30 | Refills: 0 | Status: DISCONTINUED | COMMUNITY
Start: 2017-02-15 | End: 2018-02-16

## 2018-03-14 ENCOUNTER — APPOINTMENT (OUTPATIENT)
Dept: HEMATOLOGY ONCOLOGY | Facility: CLINIC | Age: 83
End: 2018-03-14
Payer: MEDICARE

## 2018-03-14 ENCOUNTER — RESULT REVIEW (OUTPATIENT)
Age: 83
End: 2018-03-14

## 2018-03-14 VITALS
RESPIRATION RATE: 16 BRPM | SYSTOLIC BLOOD PRESSURE: 133 MMHG | WEIGHT: 231.48 LBS | TEMPERATURE: 98.2 F | BODY MASS INDEX: 28.93 KG/M2 | DIASTOLIC BLOOD PRESSURE: 72 MMHG | OXYGEN SATURATION: 96 % | HEART RATE: 75 BPM

## 2018-03-14 LAB
BASOPHILS # BLD AUTO: 0 K/UL — SIGNIFICANT CHANGE UP (ref 0–0.2)
BASOPHILS NFR BLD AUTO: 1 % — SIGNIFICANT CHANGE UP (ref 0–2)
EOSINOPHIL # BLD AUTO: 0 K/UL — SIGNIFICANT CHANGE UP (ref 0–0.5)
EOSINOPHIL NFR BLD AUTO: 1.3 % — SIGNIFICANT CHANGE UP (ref 0–6)
HCT VFR BLD CALC: 40.5 % — SIGNIFICANT CHANGE UP (ref 39–50)
HGB BLD-MCNC: 13.8 G/DL — SIGNIFICANT CHANGE UP (ref 13–17)
LYMPHOCYTES # BLD AUTO: 0.8 K/UL — LOW (ref 1–3.3)
LYMPHOCYTES # BLD AUTO: 30 % — SIGNIFICANT CHANGE UP (ref 13–44)
MCHC RBC-ENTMCNC: 32.4 PG — SIGNIFICANT CHANGE UP (ref 27–34)
MCHC RBC-ENTMCNC: 34 G/DL — SIGNIFICANT CHANGE UP (ref 32–36)
MCV RBC AUTO: 95.4 FL — SIGNIFICANT CHANGE UP (ref 80–100)
MONOCYTES # BLD AUTO: 0.3 K/UL — SIGNIFICANT CHANGE UP (ref 0–0.9)
MONOCYTES NFR BLD AUTO: 10.7 % — SIGNIFICANT CHANGE UP (ref 2–14)
NEUTROPHILS # BLD AUTO: 1.6 K/UL — LOW (ref 1.8–7.4)
NEUTROPHILS NFR BLD AUTO: 56.9 % — SIGNIFICANT CHANGE UP (ref 43–77)
PLATELET # BLD AUTO: 78 K/UL — LOW (ref 150–400)
RBC # BLD: 4.25 M/UL — SIGNIFICANT CHANGE UP (ref 4.2–5.8)
RBC # FLD: 14.6 % — HIGH (ref 10.3–14.5)
WBC # BLD: 2.8 K/UL — LOW (ref 3.8–10.5)
WBC # FLD AUTO: 2.8 K/UL — LOW (ref 3.8–10.5)

## 2018-03-14 PROCEDURE — 99214 OFFICE O/P EST MOD 30 MIN: CPT

## 2018-04-12 ENCOUNTER — OUTPATIENT (OUTPATIENT)
Dept: OUTPATIENT SERVICES | Facility: HOSPITAL | Age: 83
LOS: 1 days | Discharge: HOME | End: 2018-04-12

## 2018-04-12 DIAGNOSIS — Z95.810 PRESENCE OF AUTOMATIC (IMPLANTABLE) CARDIAC DEFIBRILLATOR: Chronic | ICD-10-CM

## 2018-04-12 DIAGNOSIS — D59.5: ICD-10-CM

## 2018-04-12 DIAGNOSIS — Z98.890 OTHER SPECIFIED POSTPROCEDURAL STATES: Chronic | ICD-10-CM

## 2018-04-12 DIAGNOSIS — D61.9 APLASTIC ANEMIA, UNSPECIFIED: ICD-10-CM

## 2018-04-17 ENCOUNTER — APPOINTMENT (OUTPATIENT)
Dept: HEMATOLOGY ONCOLOGY | Facility: CLINIC | Age: 83
End: 2018-04-17
Payer: MEDICARE

## 2018-04-17 ENCOUNTER — RESULT REVIEW (OUTPATIENT)
Age: 83
End: 2018-04-17

## 2018-04-17 VITALS
SYSTOLIC BLOOD PRESSURE: 120 MMHG | RESPIRATION RATE: 16 BRPM | DIASTOLIC BLOOD PRESSURE: 70 MMHG | WEIGHT: 228.16 LBS | BODY MASS INDEX: 28.52 KG/M2 | HEART RATE: 70 BPM | OXYGEN SATURATION: 98 % | TEMPERATURE: 96.9 F

## 2018-04-17 LAB
BASOPHILS # BLD AUTO: 0 K/UL — SIGNIFICANT CHANGE UP (ref 0–0.2)
BASOPHILS NFR BLD AUTO: 0.7 % — SIGNIFICANT CHANGE UP (ref 0–2)
EOSINOPHIL # BLD AUTO: 0.1 K/UL — SIGNIFICANT CHANGE UP (ref 0–0.5)
EOSINOPHIL NFR BLD AUTO: 4.2 % — SIGNIFICANT CHANGE UP (ref 0–6)
HCT VFR BLD CALC: 40.3 % — SIGNIFICANT CHANGE UP (ref 39–50)
HGB BLD-MCNC: 13.5 G/DL — SIGNIFICANT CHANGE UP (ref 13–17)
LYMPHOCYTES # BLD AUTO: 0.9 K/UL — LOW (ref 1–3.3)
LYMPHOCYTES # BLD AUTO: 31.1 % — SIGNIFICANT CHANGE UP (ref 13–44)
MCHC RBC-ENTMCNC: 32.6 PG — SIGNIFICANT CHANGE UP (ref 27–34)
MCHC RBC-ENTMCNC: 33.6 G/DL — SIGNIFICANT CHANGE UP (ref 32–36)
MCV RBC AUTO: 97.1 FL — SIGNIFICANT CHANGE UP (ref 80–100)
MONOCYTES # BLD AUTO: 0.3 K/UL — SIGNIFICANT CHANGE UP (ref 0–0.9)
MONOCYTES NFR BLD AUTO: 10.9 % — SIGNIFICANT CHANGE UP (ref 2–14)
NEUTROPHILS # BLD AUTO: 1.6 K/UL — LOW (ref 1.8–7.4)
NEUTROPHILS NFR BLD AUTO: 53.2 % — SIGNIFICANT CHANGE UP (ref 43–77)
PLATELET # BLD AUTO: 82 K/UL — LOW (ref 150–400)
RBC # BLD: 4.15 M/UL — LOW (ref 4.2–5.8)
RBC # FLD: 13.8 % — SIGNIFICANT CHANGE UP (ref 10.3–14.5)
WBC # BLD: 3 K/UL — LOW (ref 3.8–10.5)
WBC # FLD AUTO: 3 K/UL — LOW (ref 3.8–10.5)

## 2018-04-17 PROCEDURE — 99213 OFFICE O/P EST LOW 20 MIN: CPT

## 2018-05-15 ENCOUNTER — APPOINTMENT (OUTPATIENT)
Dept: HEMATOLOGY ONCOLOGY | Facility: CLINIC | Age: 83
End: 2018-05-15

## 2018-05-31 ENCOUNTER — OUTPATIENT (OUTPATIENT)
Dept: OUTPATIENT SERVICES | Facility: HOSPITAL | Age: 83
LOS: 1 days | Discharge: HOME | End: 2018-05-31

## 2018-05-31 DIAGNOSIS — D59.5: ICD-10-CM

## 2018-05-31 DIAGNOSIS — Z95.810 PRESENCE OF AUTOMATIC (IMPLANTABLE) CARDIAC DEFIBRILLATOR: Chronic | ICD-10-CM

## 2018-05-31 DIAGNOSIS — D61.9 APLASTIC ANEMIA, UNSPECIFIED: ICD-10-CM

## 2018-05-31 DIAGNOSIS — Z98.890 OTHER SPECIFIED POSTPROCEDURAL STATES: Chronic | ICD-10-CM

## 2018-06-05 ENCOUNTER — RESULT REVIEW (OUTPATIENT)
Age: 83
End: 2018-06-05

## 2018-06-05 ENCOUNTER — APPOINTMENT (OUTPATIENT)
Dept: HEMATOLOGY ONCOLOGY | Facility: CLINIC | Age: 83
End: 2018-06-05
Payer: MEDICARE

## 2018-06-05 VITALS
SYSTOLIC BLOOD PRESSURE: 131 MMHG | BODY MASS INDEX: 28.71 KG/M2 | WEIGHT: 229.72 LBS | TEMPERATURE: 97.9 F | RESPIRATION RATE: 16 BRPM | OXYGEN SATURATION: 98 % | HEART RATE: 93 BPM | DIASTOLIC BLOOD PRESSURE: 78 MMHG

## 2018-06-05 LAB
BASOPHILS # BLD AUTO: 0 K/UL — SIGNIFICANT CHANGE UP (ref 0–0.2)
BASOPHILS NFR BLD AUTO: 0.9 % — SIGNIFICANT CHANGE UP (ref 0–2)
EOSINOPHIL # BLD AUTO: 0.1 K/UL — SIGNIFICANT CHANGE UP (ref 0–0.5)
EOSINOPHIL NFR BLD AUTO: 2.6 % — SIGNIFICANT CHANGE UP (ref 0–6)
HCT VFR BLD CALC: 37.8 % — LOW (ref 39–50)
HGB BLD-MCNC: 13.1 G/DL — SIGNIFICANT CHANGE UP (ref 13–17)
LYMPHOCYTES # BLD AUTO: 0.9 K/UL — LOW (ref 1–3.3)
LYMPHOCYTES # BLD AUTO: 30.6 % — SIGNIFICANT CHANGE UP (ref 13–44)
MCHC RBC-ENTMCNC: 33.4 PG — SIGNIFICANT CHANGE UP (ref 27–34)
MCHC RBC-ENTMCNC: 34.6 G/DL — SIGNIFICANT CHANGE UP (ref 32–36)
MCV RBC AUTO: 96.7 FL — SIGNIFICANT CHANGE UP (ref 80–100)
MONOCYTES # BLD AUTO: 0.3 K/UL — SIGNIFICANT CHANGE UP (ref 0–0.9)
MONOCYTES NFR BLD AUTO: 11.2 % — SIGNIFICANT CHANGE UP (ref 2–14)
NEUTROPHILS # BLD AUTO: 1.6 K/UL — LOW (ref 1.8–7.4)
NEUTROPHILS NFR BLD AUTO: 54.7 % — SIGNIFICANT CHANGE UP (ref 43–77)
PLATELET # BLD AUTO: 54 K/UL — LOW (ref 150–400)
RBC # BLD: 3.91 M/UL — LOW (ref 4.2–5.8)
RBC # FLD: 13.7 % — SIGNIFICANT CHANGE UP (ref 10.3–14.5)
WBC # BLD: 2.9 K/UL — LOW (ref 3.8–10.5)
WBC # FLD AUTO: 2.9 K/UL — LOW (ref 3.8–10.5)

## 2018-06-05 PROCEDURE — 99214 OFFICE O/P EST MOD 30 MIN: CPT

## 2018-07-05 ENCOUNTER — OUTPATIENT (OUTPATIENT)
Dept: OUTPATIENT SERVICES | Facility: HOSPITAL | Age: 83
LOS: 1 days | Discharge: HOME | End: 2018-07-05

## 2018-07-05 DIAGNOSIS — D61.9 APLASTIC ANEMIA, UNSPECIFIED: ICD-10-CM

## 2018-07-05 DIAGNOSIS — Z98.890 OTHER SPECIFIED POSTPROCEDURAL STATES: Chronic | ICD-10-CM

## 2018-07-05 DIAGNOSIS — Z95.810 PRESENCE OF AUTOMATIC (IMPLANTABLE) CARDIAC DEFIBRILLATOR: Chronic | ICD-10-CM

## 2018-07-05 DIAGNOSIS — D59.5: ICD-10-CM

## 2018-07-10 ENCOUNTER — RESULT REVIEW (OUTPATIENT)
Age: 83
End: 2018-07-10

## 2018-07-10 ENCOUNTER — APPOINTMENT (OUTPATIENT)
Dept: HEMATOLOGY ONCOLOGY | Facility: CLINIC | Age: 83
End: 2018-07-10
Payer: MEDICARE

## 2018-07-10 VITALS
DIASTOLIC BLOOD PRESSURE: 69 MMHG | TEMPERATURE: 97.9 F | OXYGEN SATURATION: 98 % | HEART RATE: 71 BPM | BODY MASS INDEX: 28.66 KG/M2 | RESPIRATION RATE: 16 BRPM | WEIGHT: 229.28 LBS | SYSTOLIC BLOOD PRESSURE: 119 MMHG

## 2018-07-10 LAB
BASOPHILS # BLD AUTO: 0 K/UL — SIGNIFICANT CHANGE UP (ref 0–0.2)
BASOPHILS NFR BLD AUTO: 0.4 % — SIGNIFICANT CHANGE UP (ref 0–2)
EOSINOPHIL # BLD AUTO: 0.1 K/UL — SIGNIFICANT CHANGE UP (ref 0–0.5)
EOSINOPHIL NFR BLD AUTO: 2.3 % — SIGNIFICANT CHANGE UP (ref 0–6)
HCT VFR BLD CALC: 37.2 % — LOW (ref 39–50)
HGB BLD-MCNC: 12.7 G/DL — LOW (ref 13–17)
LYMPHOCYTES # BLD AUTO: 0.9 K/UL — LOW (ref 1–3.3)
LYMPHOCYTES # BLD AUTO: 33.5 % — SIGNIFICANT CHANGE UP (ref 13–44)
MCHC RBC-ENTMCNC: 33.1 PG — SIGNIFICANT CHANGE UP (ref 27–34)
MCHC RBC-ENTMCNC: 34.3 G/DL — SIGNIFICANT CHANGE UP (ref 32–36)
MCV RBC AUTO: 96.4 FL — SIGNIFICANT CHANGE UP (ref 80–100)
MONOCYTES # BLD AUTO: 0.3 K/UL — SIGNIFICANT CHANGE UP (ref 0–0.9)
MONOCYTES NFR BLD AUTO: 12.7 % — SIGNIFICANT CHANGE UP (ref 2–14)
NEUTROPHILS # BLD AUTO: 1.3 K/UL — LOW (ref 1.8–7.4)
NEUTROPHILS NFR BLD AUTO: 51 % — SIGNIFICANT CHANGE UP (ref 43–77)
PLATELET # BLD AUTO: 44 K/UL — LOW (ref 150–400)
RBC # BLD: 3.86 M/UL — LOW (ref 4.2–5.8)
RBC # FLD: 13.6 % — SIGNIFICANT CHANGE UP (ref 10.3–14.5)
WBC # BLD: 2.6 K/UL — LOW (ref 3.8–10.5)
WBC # FLD AUTO: 2.6 K/UL — LOW (ref 3.8–10.5)

## 2018-07-10 PROCEDURE — 99214 OFFICE O/P EST MOD 30 MIN: CPT

## 2018-07-10 RX ORDER — CHLORHEXIDINE GLUCONATE 4 %
325 (65 FE) LIQUID (ML) TOPICAL TWICE DAILY
Qty: 60 | Refills: 1 | Status: DISCONTINUED | COMMUNITY
Start: 2017-11-17 | End: 2018-07-10

## 2018-07-27 ENCOUNTER — OUTPATIENT (OUTPATIENT)
Dept: OUTPATIENT SERVICES | Facility: HOSPITAL | Age: 83
LOS: 1 days | Discharge: HOME | End: 2018-07-27

## 2018-07-27 DIAGNOSIS — Z95.810 PRESENCE OF AUTOMATIC (IMPLANTABLE) CARDIAC DEFIBRILLATOR: Chronic | ICD-10-CM

## 2018-07-27 DIAGNOSIS — D59.5: ICD-10-CM

## 2018-07-27 DIAGNOSIS — Z98.890 OTHER SPECIFIED POSTPROCEDURAL STATES: Chronic | ICD-10-CM

## 2018-07-27 DIAGNOSIS — D61.9 APLASTIC ANEMIA, UNSPECIFIED: ICD-10-CM

## 2018-07-27 PROBLEM — I25.10 ATHEROSCLEROTIC HEART DISEASE OF NATIVE CORONARY ARTERY WITHOUT ANGINA PECTORIS: Chronic | Status: ACTIVE | Noted: 2017-08-24

## 2018-07-27 PROBLEM — I71.2 THORACIC AORTIC ANEURYSM, WITHOUT RUPTURE: Chronic | Status: ACTIVE | Noted: 2017-08-24

## 2018-07-27 PROBLEM — I48.2 CHRONIC ATRIAL FIBRILLATION: Chronic | Status: ACTIVE | Noted: 2017-08-24

## 2018-07-27 PROBLEM — H91.90 UNSPECIFIED HEARING LOSS, UNSPECIFIED EAR: Chronic | Status: ACTIVE | Noted: 2017-08-24

## 2018-07-27 PROBLEM — I42.9 CARDIOMYOPATHY, UNSPECIFIED: Chronic | Status: ACTIVE | Noted: 2017-08-24

## 2018-08-07 ENCOUNTER — APPOINTMENT (OUTPATIENT)
Dept: HEMATOLOGY ONCOLOGY | Facility: CLINIC | Age: 83
End: 2018-08-07
Payer: MEDICARE

## 2018-08-07 ENCOUNTER — RESULT REVIEW (OUTPATIENT)
Age: 83
End: 2018-08-07

## 2018-08-07 VITALS
TEMPERATURE: 97.8 F | HEART RATE: 78 BPM | WEIGHT: 227.5 LBS | SYSTOLIC BLOOD PRESSURE: 120 MMHG | RESPIRATION RATE: 18 BRPM | BODY MASS INDEX: 28.44 KG/M2 | DIASTOLIC BLOOD PRESSURE: 80 MMHG | OXYGEN SATURATION: 99 %

## 2018-08-07 LAB
BASOPHILS # BLD AUTO: 0 K/UL — SIGNIFICANT CHANGE UP (ref 0–0.2)
BASOPHILS NFR BLD AUTO: 2 % — SIGNIFICANT CHANGE UP (ref 0–2)
EOSINOPHIL # BLD AUTO: 0 K/UL — SIGNIFICANT CHANGE UP (ref 0–0.5)
HCT VFR BLD CALC: 37.7 % — LOW (ref 39–50)
HGB BLD-MCNC: 12.9 G/DL — LOW (ref 13–17)
LYMPHOCYTES # BLD AUTO: 0.9 K/UL — LOW (ref 1–3.3)
LYMPHOCYTES # BLD AUTO: 36 % — SIGNIFICANT CHANGE UP (ref 13–44)
MCHC RBC-ENTMCNC: 33 PG — SIGNIFICANT CHANGE UP (ref 27–34)
MCHC RBC-ENTMCNC: 34.3 G/DL — SIGNIFICANT CHANGE UP (ref 32–36)
MCV RBC AUTO: 96.1 FL — SIGNIFICANT CHANGE UP (ref 80–100)
MONOCYTES # BLD AUTO: 0.2 K/UL — SIGNIFICANT CHANGE UP (ref 0–0.9)
MONOCYTES NFR BLD AUTO: 4 % — SIGNIFICANT CHANGE UP (ref 2–14)
NEUTROPHILS # BLD AUTO: 1.5 K/UL — LOW (ref 1.8–7.4)
NEUTROPHILS NFR BLD AUTO: 58 % — SIGNIFICANT CHANGE UP (ref 43–77)
PLAT MORPH BLD: NORMAL — SIGNIFICANT CHANGE UP
PLATELET # BLD AUTO: 42 K/UL — LOW (ref 150–400)
RBC # BLD: 3.92 M/UL — LOW (ref 4.2–5.8)
RBC # FLD: 14.4 % — SIGNIFICANT CHANGE UP (ref 10.3–14.5)
RBC BLD AUTO: SIGNIFICANT CHANGE UP
WBC # BLD: 2.7 K/UL — LOW (ref 3.8–10.5)
WBC # FLD AUTO: 2.7 K/UL — LOW (ref 3.8–10.5)

## 2018-08-07 PROCEDURE — 99214 OFFICE O/P EST MOD 30 MIN: CPT

## 2018-08-07 RX ORDER — ASPIRIN 81 MG/1
81 TABLET, CHEWABLE ORAL DAILY
Refills: 0 | Status: DISCONTINUED | COMMUNITY
Start: 2017-03-28 | End: 2018-08-07

## 2018-09-11 ENCOUNTER — OUTPATIENT (OUTPATIENT)
Dept: OUTPATIENT SERVICES | Facility: HOSPITAL | Age: 83
LOS: 1 days | Discharge: HOME | End: 2018-09-11

## 2018-09-11 DIAGNOSIS — Z98.890 OTHER SPECIFIED POSTPROCEDURAL STATES: Chronic | ICD-10-CM

## 2018-09-11 DIAGNOSIS — Z95.810 PRESENCE OF AUTOMATIC (IMPLANTABLE) CARDIAC DEFIBRILLATOR: Chronic | ICD-10-CM

## 2018-09-11 DIAGNOSIS — D59.5: ICD-10-CM

## 2018-09-18 ENCOUNTER — RESULT REVIEW (OUTPATIENT)
Age: 83
End: 2018-09-18

## 2018-09-18 ENCOUNTER — APPOINTMENT (OUTPATIENT)
Dept: HEMATOLOGY ONCOLOGY | Facility: CLINIC | Age: 83
End: 2018-09-18
Payer: MEDICARE

## 2018-09-18 VITALS
BODY MASS INDEX: 28.66 KG/M2 | SYSTOLIC BLOOD PRESSURE: 116 MMHG | HEART RATE: 93 BPM | TEMPERATURE: 97.6 F | DIASTOLIC BLOOD PRESSURE: 66 MMHG | OXYGEN SATURATION: 97 % | WEIGHT: 229.28 LBS

## 2018-09-18 LAB
BASOPHILS # BLD AUTO: 0 K/UL — SIGNIFICANT CHANGE UP (ref 0–0.2)
BASOPHILS NFR BLD AUTO: 0.3 % — SIGNIFICANT CHANGE UP (ref 0–2)
EOSINOPHIL # BLD AUTO: 0.1 K/UL — SIGNIFICANT CHANGE UP (ref 0–0.5)
EOSINOPHIL NFR BLD AUTO: 2.5 % — SIGNIFICANT CHANGE UP (ref 0–6)
HCT VFR BLD CALC: 37.3 % — LOW (ref 39–50)
HGB BLD-MCNC: 12.5 G/DL — LOW (ref 13–17)
LYMPHOCYTES # BLD AUTO: 0.8 K/UL — LOW (ref 1–3.3)
LYMPHOCYTES # BLD AUTO: 38.9 % — SIGNIFICANT CHANGE UP (ref 13–44)
MCHC RBC-ENTMCNC: 32.7 PG — SIGNIFICANT CHANGE UP (ref 27–34)
MCHC RBC-ENTMCNC: 33.5 G/DL — SIGNIFICANT CHANGE UP (ref 32–36)
MCV RBC AUTO: 97.6 FL — SIGNIFICANT CHANGE UP (ref 80–100)
MONOCYTES # BLD AUTO: 0.2 K/UL — SIGNIFICANT CHANGE UP (ref 0–0.9)
MONOCYTES NFR BLD AUTO: 8.8 % — SIGNIFICANT CHANGE UP (ref 2–14)
NEUTROPHILS # BLD AUTO: 1.1 K/UL — LOW (ref 1.8–7.4)
NEUTROPHILS NFR BLD AUTO: 49.5 % — SIGNIFICANT CHANGE UP (ref 43–77)
PLATELET # BLD AUTO: 31 K/UL — LOW (ref 150–400)
RBC # BLD: 3.82 M/UL — LOW (ref 4.2–5.8)
RBC # FLD: 14.6 % — HIGH (ref 10.3–14.5)
WBC # BLD: 2.1 K/UL — LOW (ref 3.8–10.5)
WBC # FLD AUTO: 2.1 K/UL — LOW (ref 3.8–10.5)

## 2018-09-18 PROCEDURE — 99214 OFFICE O/P EST MOD 30 MIN: CPT

## 2018-09-18 RX ORDER — SENNOSIDES 8.6 MG TABLETS 8.6 MG/1
8.6 TABLET ORAL
Refills: 0 | Status: DISCONTINUED | COMMUNITY
Start: 2017-08-08 | End: 2018-09-18

## 2018-10-08 ENCOUNTER — OUTPATIENT (OUTPATIENT)
Dept: OUTPATIENT SERVICES | Facility: HOSPITAL | Age: 83
LOS: 1 days | Discharge: HOME | End: 2018-10-08

## 2018-10-08 DIAGNOSIS — Z95.810 PRESENCE OF AUTOMATIC (IMPLANTABLE) CARDIAC DEFIBRILLATOR: Chronic | ICD-10-CM

## 2018-10-08 DIAGNOSIS — Z98.890 OTHER SPECIFIED POSTPROCEDURAL STATES: Chronic | ICD-10-CM

## 2018-10-08 DIAGNOSIS — D61.9 APLASTIC ANEMIA, UNSPECIFIED: ICD-10-CM

## 2018-10-08 DIAGNOSIS — D59.5: ICD-10-CM

## 2018-10-16 ENCOUNTER — RESULT REVIEW (OUTPATIENT)
Age: 83
End: 2018-10-16

## 2018-10-16 ENCOUNTER — APPOINTMENT (OUTPATIENT)
Dept: HEMATOLOGY ONCOLOGY | Facility: CLINIC | Age: 83
End: 2018-10-16
Payer: MEDICARE

## 2018-10-16 LAB
BASOPHILS # BLD AUTO: 0 K/UL — SIGNIFICANT CHANGE UP (ref 0–0.2)
BASOPHILS NFR BLD AUTO: 1.4 % — SIGNIFICANT CHANGE UP (ref 0–2)
EOSINOPHIL # BLD AUTO: 0.1 K/UL — SIGNIFICANT CHANGE UP (ref 0–0.5)
EOSINOPHIL NFR BLD AUTO: 3.4 % — SIGNIFICANT CHANGE UP (ref 0–6)
HCT VFR BLD CALC: 36.3 % — LOW (ref 39–50)
HGB BLD-MCNC: 12.7 G/DL — LOW (ref 13–17)
LYMPHOCYTES # BLD AUTO: 0.9 K/UL — LOW (ref 1–3.3)
LYMPHOCYTES # BLD AUTO: 36.5 % — SIGNIFICANT CHANGE UP (ref 13–44)
MCHC RBC-ENTMCNC: 34.7 PG — HIGH (ref 27–34)
MCHC RBC-ENTMCNC: 34.9 G/DL — SIGNIFICANT CHANGE UP (ref 32–36)
MCV RBC AUTO: 99.4 FL — SIGNIFICANT CHANGE UP (ref 80–100)
MONOCYTES # BLD AUTO: 0.2 K/UL — SIGNIFICANT CHANGE UP (ref 0–0.9)
MONOCYTES NFR BLD AUTO: 6.8 % — SIGNIFICANT CHANGE UP (ref 2–14)
NEUTROPHILS # BLD AUTO: 1.3 K/UL — LOW (ref 1.8–7.4)
NEUTROPHILS NFR BLD AUTO: 51.9 % — SIGNIFICANT CHANGE UP (ref 43–77)
PLATELET # BLD AUTO: 25 K/UL — LOW (ref 150–400)
RBC # BLD: 3.65 M/UL — LOW (ref 4.2–5.8)
RBC # FLD: 14.9 % — HIGH (ref 10.3–14.5)
WBC # BLD: 2.4 K/UL — LOW (ref 3.8–10.5)
WBC # FLD AUTO: 2.4 K/UL — LOW (ref 3.8–10.5)

## 2018-10-16 PROCEDURE — 99214 OFFICE O/P EST MOD 30 MIN: CPT

## 2018-10-16 PROCEDURE — 99213 OFFICE O/P EST LOW 20 MIN: CPT

## 2018-10-16 RX ORDER — ALPRAZOLAM 0.5 MG/1
0.5 TABLET ORAL
Qty: 30 | Refills: 0 | Status: DISCONTINUED | COMMUNITY
Start: 2018-01-19 | End: 2018-10-16

## 2018-10-17 ENCOUNTER — OUTPATIENT (OUTPATIENT)
Dept: OUTPATIENT SERVICES | Facility: HOSPITAL | Age: 83
LOS: 1 days | End: 2018-10-17
Payer: MEDICARE

## 2018-10-17 DIAGNOSIS — D61.9 APLASTIC ANEMIA, UNSPECIFIED: ICD-10-CM

## 2018-10-17 DIAGNOSIS — Z95.810 PRESENCE OF AUTOMATIC (IMPLANTABLE) CARDIAC DEFIBRILLATOR: Chronic | ICD-10-CM

## 2018-10-17 DIAGNOSIS — Z98.890 OTHER SPECIFIED POSTPROCEDURAL STATES: Chronic | ICD-10-CM

## 2018-10-18 ENCOUNTER — RESULT REVIEW (OUTPATIENT)
Age: 83
End: 2018-10-18

## 2018-10-18 ENCOUNTER — APPOINTMENT (OUTPATIENT)
Dept: HEMATOLOGY ONCOLOGY | Facility: CLINIC | Age: 83
End: 2018-10-18
Payer: MEDICARE

## 2018-10-18 VITALS
BODY MASS INDEX: 28.88 KG/M2 | RESPIRATION RATE: 18 BRPM | SYSTOLIC BLOOD PRESSURE: 137 MMHG | DIASTOLIC BLOOD PRESSURE: 79 MMHG | WEIGHT: 231.04 LBS | TEMPERATURE: 97.6 F | HEART RATE: 85 BPM | OXYGEN SATURATION: 100 %

## 2018-10-18 LAB
BASOPHILS # BLD AUTO: 0 K/UL — SIGNIFICANT CHANGE UP (ref 0–0.2)
BASOPHILS NFR BLD AUTO: 0.4 % — SIGNIFICANT CHANGE UP (ref 0–2)
EOSINOPHIL # BLD AUTO: 0 K/UL — SIGNIFICANT CHANGE UP (ref 0–0.5)
EOSINOPHIL NFR BLD AUTO: 1.2 % — SIGNIFICANT CHANGE UP (ref 0–6)
HCT VFR BLD CALC: 36.2 % — LOW (ref 39–50)
HGB BLD-MCNC: 12.5 G/DL — LOW (ref 13–17)
LYMPHOCYTES # BLD AUTO: 1.1 K/UL — SIGNIFICANT CHANGE UP (ref 1–3.3)
LYMPHOCYTES # BLD AUTO: 42 % — SIGNIFICANT CHANGE UP (ref 13–44)
MCHC RBC-ENTMCNC: 34.2 PG — HIGH (ref 27–34)
MCHC RBC-ENTMCNC: 34.5 G/DL — SIGNIFICANT CHANGE UP (ref 32–36)
MCV RBC AUTO: 99.3 FL — SIGNIFICANT CHANGE UP (ref 80–100)
MONOCYTES # BLD AUTO: 0.3 K/UL — SIGNIFICANT CHANGE UP (ref 0–0.9)
MONOCYTES NFR BLD AUTO: 11.5 % — SIGNIFICANT CHANGE UP (ref 2–14)
NEUTROPHILS # BLD AUTO: 1.2 K/UL — LOW (ref 1.8–7.4)
NEUTROPHILS NFR BLD AUTO: 45 % — SIGNIFICANT CHANGE UP (ref 43–77)
PLATELET # BLD AUTO: 26 K/UL — LOW (ref 150–400)
RBC # BLD: 3.64 M/UL — LOW (ref 4.2–5.8)
RBC # FLD: 14.9 % — HIGH (ref 10.3–14.5)
WBC # BLD: 2.6 K/UL — LOW (ref 3.8–10.5)
WBC # FLD AUTO: 2.6 K/UL — LOW (ref 3.8–10.5)

## 2018-10-18 PROCEDURE — 88237 TISSUE CULTURE BONE MARROW: CPT

## 2018-10-18 PROCEDURE — 88264 CHROMOSOME ANALYSIS 20-25: CPT

## 2018-10-18 PROCEDURE — 88341 IMHCHEM/IMCYTCHM EA ADD ANTB: CPT | Mod: 26

## 2018-10-18 PROCEDURE — 85097 BONE MARROW INTERPRETATION: CPT

## 2018-10-18 PROCEDURE — 88280 CHROMOSOME KARYOTYPE STUDY: CPT

## 2018-10-18 PROCEDURE — 88185 FLOWCYTOMETRY/TC ADD-ON: CPT

## 2018-10-18 PROCEDURE — 88305 TISSUE EXAM BY PATHOLOGIST: CPT

## 2018-10-18 PROCEDURE — 88313 SPECIAL STAINS GROUP 2: CPT | Mod: 26

## 2018-10-18 PROCEDURE — 38222 DX BONE MARROW BX & ASPIR: CPT | Mod: LT

## 2018-10-18 PROCEDURE — 88305 TISSUE EXAM BY PATHOLOGIST: CPT | Mod: 26

## 2018-10-18 PROCEDURE — 88184 FLOWCYTOMETRY/ TC 1 MARKER: CPT

## 2018-10-18 PROCEDURE — 88313 SPECIAL STAINS GROUP 2: CPT

## 2018-10-18 PROCEDURE — 88342 IMHCHEM/IMCYTCHM 1ST ANTB: CPT | Mod: 26,59

## 2018-10-18 PROCEDURE — 88341 IMHCHEM/IMCYTCHM EA ADD ANTB: CPT

## 2018-10-19 ENCOUNTER — RESULT REVIEW (OUTPATIENT)
Age: 83
End: 2018-10-19

## 2018-10-24 LAB
HEMATOPATHOLOGY REPORT: SIGNIFICANT CHANGE UP
TM INTERPRETATION: SIGNIFICANT CHANGE UP

## 2018-10-30 ENCOUNTER — APPOINTMENT (OUTPATIENT)
Dept: HEMATOLOGY ONCOLOGY | Facility: CLINIC | Age: 83
End: 2018-10-30
Payer: MEDICARE

## 2018-10-30 ENCOUNTER — RESULT REVIEW (OUTPATIENT)
Age: 83
End: 2018-10-30

## 2018-10-30 LAB
BASOPHILS # BLD AUTO: 0 K/UL — SIGNIFICANT CHANGE UP (ref 0–0.2)
BASOPHILS NFR BLD AUTO: 0.3 % — SIGNIFICANT CHANGE UP (ref 0–2)
EOSINOPHIL # BLD AUTO: 0.1 K/UL — SIGNIFICANT CHANGE UP (ref 0–0.5)
EOSINOPHIL NFR BLD AUTO: 2.4 % — SIGNIFICANT CHANGE UP (ref 0–6)
HCT VFR BLD CALC: 37 % — LOW (ref 39–50)
HGB BLD-MCNC: 12.5 G/DL — LOW (ref 13–17)
LYMPHOCYTES # BLD AUTO: 0.9 K/UL — LOW (ref 1–3.3)
LYMPHOCYTES # BLD AUTO: 38.3 % — SIGNIFICANT CHANGE UP (ref 13–44)
MCHC RBC-ENTMCNC: 33.8 G/DL — SIGNIFICANT CHANGE UP (ref 32–36)
MCHC RBC-ENTMCNC: 34 PG — SIGNIFICANT CHANGE UP (ref 27–34)
MCV RBC AUTO: 101 FL — HIGH (ref 80–100)
MONOCYTES # BLD AUTO: 0.2 K/UL — SIGNIFICANT CHANGE UP (ref 0–0.9)
MONOCYTES NFR BLD AUTO: 10.5 % — SIGNIFICANT CHANGE UP (ref 2–14)
NEUTROPHILS # BLD AUTO: 1.1 K/UL — LOW (ref 1.8–7.4)
NEUTROPHILS NFR BLD AUTO: 48.6 % — SIGNIFICANT CHANGE UP (ref 43–77)
PLATELET # BLD AUTO: 19 K/UL — CRITICAL LOW (ref 150–400)
RBC # BLD: 3.68 M/UL — LOW (ref 4.2–5.8)
RBC # FLD: 15.3 % — HIGH (ref 10.3–14.5)
WBC # BLD: 2.2 K/UL — LOW (ref 3.8–10.5)
WBC # FLD AUTO: 2.2 K/UL — LOW (ref 3.8–10.5)

## 2018-10-30 PROCEDURE — 99213 OFFICE O/P EST LOW 20 MIN: CPT

## 2018-11-02 ENCOUNTER — OUTPATIENT (OUTPATIENT)
Dept: OUTPATIENT SERVICES | Facility: HOSPITAL | Age: 83
LOS: 1 days | Discharge: HOME | End: 2018-11-02

## 2018-11-02 DIAGNOSIS — Z98.890 OTHER SPECIFIED POSTPROCEDURAL STATES: Chronic | ICD-10-CM

## 2018-11-02 DIAGNOSIS — Z95.810 PRESENCE OF AUTOMATIC (IMPLANTABLE) CARDIAC DEFIBRILLATOR: Chronic | ICD-10-CM

## 2018-11-02 DIAGNOSIS — D59.5: ICD-10-CM

## 2018-11-02 DIAGNOSIS — D61.9 APLASTIC ANEMIA, UNSPECIFIED: ICD-10-CM

## 2018-11-02 LAB — CHROM ANALY OVERALL INTERP SPEC-IMP: SIGNIFICANT CHANGE UP

## 2018-11-06 ENCOUNTER — APPOINTMENT (OUTPATIENT)
Dept: HEMATOLOGY ONCOLOGY | Facility: CLINIC | Age: 83
End: 2018-11-06

## 2018-11-06 ENCOUNTER — RESULT REVIEW (OUTPATIENT)
Age: 83
End: 2018-11-06

## 2018-11-06 LAB
BASOPHILS # BLD AUTO: 0 K/UL — SIGNIFICANT CHANGE UP (ref 0–0.2)
EOSINOPHIL # BLD AUTO: 0 K/UL — SIGNIFICANT CHANGE UP (ref 0–0.5)
HCT VFR BLD CALC: 35.1 % — LOW (ref 39–50)
HGB BLD-MCNC: 12 G/DL — LOW (ref 13–17)
LYMPHOCYTES # BLD AUTO: 0.8 K/UL — LOW (ref 1–3.3)
LYMPHOCYTES # BLD AUTO: 36 % — SIGNIFICANT CHANGE UP (ref 13–44)
MCHC RBC-ENTMCNC: 34.3 G/DL — SIGNIFICANT CHANGE UP (ref 32–36)
MCHC RBC-ENTMCNC: 34.8 PG — HIGH (ref 27–34)
MCV RBC AUTO: 101 FL — HIGH (ref 80–100)
MONOCYTES # BLD AUTO: 0.2 K/UL — SIGNIFICANT CHANGE UP (ref 0–0.9)
MONOCYTES NFR BLD AUTO: 8 % — SIGNIFICANT CHANGE UP (ref 2–14)
NEUTROPHILS # BLD AUTO: 1 K/UL — LOW (ref 1.8–7.4)
NEUTROPHILS NFR BLD AUTO: 55 % — SIGNIFICANT CHANGE UP (ref 43–77)
PLAT MORPH BLD: NORMAL — SIGNIFICANT CHANGE UP
PLATELET # BLD AUTO: 17 K/UL — CRITICAL LOW (ref 150–400)
RBC # BLD: 3.47 M/UL — LOW (ref 4.2–5.8)
RBC # FLD: 15.3 % — HIGH (ref 10.3–14.5)
RBC BLD AUTO: SIGNIFICANT CHANGE UP
VARIANT LYMPHS # BLD: 1 % — SIGNIFICANT CHANGE UP (ref 0–6)
WBC # BLD: 1.9 K/UL — LOW (ref 3.8–10.5)
WBC # FLD AUTO: 1.9 K/UL — LOW (ref 3.8–10.5)

## 2018-11-13 ENCOUNTER — APPOINTMENT (OUTPATIENT)
Dept: HEMATOLOGY ONCOLOGY | Facility: CLINIC | Age: 83
End: 2018-11-13
Payer: MEDICARE

## 2018-11-13 ENCOUNTER — RESULT REVIEW (OUTPATIENT)
Age: 83
End: 2018-11-13

## 2018-11-13 VITALS
OXYGEN SATURATION: 99 % | WEIGHT: 231.48 LBS | RESPIRATION RATE: 16 BRPM | HEART RATE: 77 BPM | SYSTOLIC BLOOD PRESSURE: 143 MMHG | DIASTOLIC BLOOD PRESSURE: 85 MMHG | TEMPERATURE: 97.6 F | BODY MASS INDEX: 28.93 KG/M2

## 2018-11-13 LAB
BASOPHILS # BLD AUTO: 0 K/UL — SIGNIFICANT CHANGE UP (ref 0–0.2)
BASOPHILS NFR BLD AUTO: 0.3 % — SIGNIFICANT CHANGE UP (ref 0–2)
EOSINOPHIL # BLD AUTO: 0.1 K/UL — SIGNIFICANT CHANGE UP (ref 0–0.5)
EOSINOPHIL NFR BLD AUTO: 2.1 % — SIGNIFICANT CHANGE UP (ref 0–6)
HCT VFR BLD CALC: 35.7 % — LOW (ref 39–50)
HGB BLD-MCNC: 12.3 G/DL — LOW (ref 13–17)
LYMPHOCYTES # BLD AUTO: 1.1 K/UL — SIGNIFICANT CHANGE UP (ref 1–3.3)
LYMPHOCYTES # BLD AUTO: 42.6 % — SIGNIFICANT CHANGE UP (ref 13–44)
MCHC RBC-ENTMCNC: 34.5 G/DL — SIGNIFICANT CHANGE UP (ref 32–36)
MCHC RBC-ENTMCNC: 35 PG — HIGH (ref 27–34)
MCV RBC AUTO: 101 FL — HIGH (ref 80–100)
MONOCYTES # BLD AUTO: 0.2 K/UL — SIGNIFICANT CHANGE UP (ref 0–0.9)
MONOCYTES NFR BLD AUTO: 9.9 % — SIGNIFICANT CHANGE UP (ref 2–14)
NEUTROPHILS # BLD AUTO: 1.1 K/UL — LOW (ref 1.8–7.4)
NEUTROPHILS NFR BLD AUTO: 45.2 % — SIGNIFICANT CHANGE UP (ref 43–77)
PLATELET # BLD AUTO: 16 K/UL — CRITICAL LOW (ref 150–400)
RBC # BLD: 3.52 M/UL — LOW (ref 4.2–5.8)
RBC # FLD: 15.4 % — HIGH (ref 10.3–14.5)
WBC # BLD: 2.5 K/UL — LOW (ref 3.8–10.5)
WBC # FLD AUTO: 2.5 K/UL — LOW (ref 3.8–10.5)

## 2018-11-13 PROCEDURE — 99214 OFFICE O/P EST MOD 30 MIN: CPT

## 2018-11-13 NOTE — HISTORY OF PRESENT ILLNESS
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  Has occasional glass of wine.  His balance  remains  off , but not worsening.   He c/o barometric headaches/neck pain,  chronic not worsening.  Some bruising. No other complaints. No  fever, bleeding, jaundice, hematuria, dark urine, visual problems, chest pain, SOB, abdominal pain, melena, BRBPR. Weight has been stable. Had flu vaccine.\par \par \par \par

## 2018-11-13 NOTE — CONSULT LETTER
[Dear  ___] : Dear  [unfilled], [Courtesy Letter:] : I had the pleasure of seeing your patient, [unfilled], in my office today. [Please see my note below.] : Please see my note below. [Sincerely,] : Sincerely, [DrDouglas  ___] : Dr. ALMEIDA [DrDouglas ___] : Dr. ALMEIDA [FreeTextEntry2] : Venkat Lopez MD [FreeTextEntry3] : Jad Yeager MD

## 2018-11-13 NOTE — REASON FOR VISIT
[Follow-Up Visit] : a follow-up visit for [Blood Count Assessment] : blood count assessment [Spouse] : spouse

## 2018-11-13 NOTE — RESULTS/DATA
[FreeTextEntry1] : WBC 2,500, Hgb 12.3, Hct 35.7, Plts 16,000, Diff  normal,  ANC 1,100 \par \par 10/18/18 Bone marrow: erythroid hyperplasia, decreased megakaryocytes, increased mast cells, focal glandular structures, increased Fe. 46, XY. Flow: 2 small clonal B-cell populations : k, dim CD 19 and 20; lambda, bright CD 19 and 20.

## 2018-11-13 NOTE — ASSESSMENT
[Palliative Care Plan] : not applicable at this time [FreeTextEntry1] : 84 YO M with aplastic anemia plus a PNH subpopulation. Last marrow suggestive of dyspoietic changes. 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 Dr. Tomas is observing this expectantly. His PNH screen is now normal. Following normalization of his platelets with Promacta, the platelet count is now slowly dropping and he has become markedly thrombocytopenic. Marrow shows decreased megakaryocytes. 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 Will monitor his count closely. \par \par Plan:\par Hold ASA\par D/C Promacta\par Await NGS results from marrow\par To ER prn\par CBC in 2 weeks\par RTC in 1 month\par \par

## 2018-11-13 NOTE — PHYSICAL EXAM
[Restricted in physically strenuous activity but ambulatory and able to carry out work of a light or sedentary nature] : Status 1- Restricted in physically strenuous activity but ambulatory and able to carry out work of a light or sedentary nature, e.g., light house work, office work [Normal] : affect appropriate [de-identified] : AICD left anterior chest wall. RRR. S1S2 normal. No murmurs, gallops. [de-identified] : Senile purpura on arms with brawny changes.

## 2018-11-26 ENCOUNTER — TRANSCRIPTION ENCOUNTER (OUTPATIENT)
Age: 83
End: 2018-11-26

## 2018-11-26 ENCOUNTER — INPATIENT (INPATIENT)
Facility: HOSPITAL | Age: 83
LOS: 7 days | Discharge: HOME | DRG: 809 | End: 2018-12-04
Attending: INTERNAL MEDICINE | Admitting: INTERNAL MEDICINE
Payer: MEDICARE

## 2018-11-26 VITALS — WEIGHT: 238.32 LBS

## 2018-11-26 DIAGNOSIS — I42.9 CARDIOMYOPATHY, UNSPECIFIED: ICD-10-CM

## 2018-11-26 DIAGNOSIS — D61.9 APLASTIC ANEMIA, UNSPECIFIED: ICD-10-CM

## 2018-11-26 DIAGNOSIS — E78.5 HYPERLIPIDEMIA, UNSPECIFIED: ICD-10-CM

## 2018-11-26 DIAGNOSIS — Z95.810 PRESENCE OF AUTOMATIC (IMPLANTABLE) CARDIAC DEFIBRILLATOR: Chronic | ICD-10-CM

## 2018-11-26 DIAGNOSIS — Z98.890 OTHER SPECIFIED POSTPROCEDURAL STATES: Chronic | ICD-10-CM

## 2018-11-26 DIAGNOSIS — I48.2 CHRONIC ATRIAL FIBRILLATION: ICD-10-CM

## 2018-11-26 DIAGNOSIS — B99.9 UNSPECIFIED INFECTIOUS DISEASE: ICD-10-CM

## 2018-11-26 DIAGNOSIS — Z29.9 ENCOUNTER FOR PROPHYLACTIC MEASURES, UNSPECIFIED: ICD-10-CM

## 2018-11-26 LAB
ALBUMIN SERPL ELPH-MCNC: 4.3 G/DL — SIGNIFICANT CHANGE UP (ref 3.3–5)
ALP SERPL-CCNC: 81 U/L — SIGNIFICANT CHANGE UP (ref 40–120)
ALT FLD-CCNC: 15 U/L — SIGNIFICANT CHANGE UP (ref 10–45)
ANION GAP SERPL CALC-SCNC: 14 MMOL/L — SIGNIFICANT CHANGE UP (ref 5–17)
APTT BLD: 28 SEC — SIGNIFICANT CHANGE UP (ref 27.5–36.3)
AST SERPL-CCNC: 19 U/L — SIGNIFICANT CHANGE UP (ref 10–40)
BASOPHILS # BLD AUTO: 0 K/UL — SIGNIFICANT CHANGE UP (ref 0–0.2)
BASOPHILS NFR BLD AUTO: 0.8 % — SIGNIFICANT CHANGE UP (ref 0–2)
BILIRUB SERPL-MCNC: 0.7 MG/DL — SIGNIFICANT CHANGE UP (ref 0.2–1.2)
BLD GP AB SCN SERPL QL: NEGATIVE — SIGNIFICANT CHANGE UP
BUN SERPL-MCNC: 35 MG/DL — HIGH (ref 7–23)
CALCIUM SERPL-MCNC: 9.3 MG/DL — SIGNIFICANT CHANGE UP (ref 8.4–10.5)
CHLORIDE SERPL-SCNC: 105 MMOL/L — SIGNIFICANT CHANGE UP (ref 96–108)
CO2 SERPL-SCNC: 25 MMOL/L — SIGNIFICANT CHANGE UP (ref 22–31)
CREAT SERPL-MCNC: 1.2 MG/DL — SIGNIFICANT CHANGE UP (ref 0.5–1.3)
EOSINOPHIL # BLD AUTO: 0 K/UL — SIGNIFICANT CHANGE UP (ref 0–0.5)
EOSINOPHIL NFR BLD AUTO: 0.6 % — SIGNIFICANT CHANGE UP (ref 0–6)
FIBRINOGEN PPP-MCNC: 569 MG/DL — HIGH (ref 350–510)
GLUCOSE SERPL-MCNC: 134 MG/DL — HIGH (ref 70–99)
HCT VFR BLD CALC: 34.2 % — LOW (ref 39–50)
HGB BLD-MCNC: 12 G/DL — LOW (ref 13–17)
INR BLD: 1.03 RATIO — SIGNIFICANT CHANGE UP (ref 0.88–1.16)
LDH SERPL L TO P-CCNC: 174 U/L — SIGNIFICANT CHANGE UP (ref 50–242)
LYMPHOCYTES # BLD AUTO: 0.8 K/UL — LOW (ref 1–3.3)
LYMPHOCYTES # BLD AUTO: 40.9 % — SIGNIFICANT CHANGE UP (ref 13–44)
MAGNESIUM SERPL-MCNC: 2.3 MG/DL — SIGNIFICANT CHANGE UP (ref 1.6–2.6)
MCHC RBC-ENTMCNC: 35 GM/DL — SIGNIFICANT CHANGE UP (ref 32–36)
MCHC RBC-ENTMCNC: 35.8 PG — HIGH (ref 27–34)
MCV RBC AUTO: 102 FL — HIGH (ref 80–100)
MONOCYTES # BLD AUTO: 0.2 K/UL — SIGNIFICANT CHANGE UP (ref 0–0.9)
MONOCYTES NFR BLD AUTO: 12.3 % — SIGNIFICANT CHANGE UP (ref 2–14)
NEUTROPHILS # BLD AUTO: 0.8 K/UL — LOW (ref 1.8–7.4)
NEUTROPHILS NFR BLD AUTO: 45.4 % — SIGNIFICANT CHANGE UP (ref 43–77)
PHOSPHATE SERPL-MCNC: 3.3 MG/DL — SIGNIFICANT CHANGE UP (ref 2.5–4.5)
PLATELET # BLD AUTO: 14 K/UL — CRITICAL LOW (ref 150–400)
POTASSIUM SERPL-MCNC: 4.5 MMOL/L — SIGNIFICANT CHANGE UP (ref 3.5–5.3)
POTASSIUM SERPL-SCNC: 4.5 MMOL/L — SIGNIFICANT CHANGE UP (ref 3.5–5.3)
PROT SERPL-MCNC: 7.2 G/DL — SIGNIFICANT CHANGE UP (ref 6–8.3)
PROTHROM AB SERPL-ACNC: 11.8 SEC — SIGNIFICANT CHANGE UP (ref 10–12.9)
RBC # BLD: 3.34 M/UL — LOW (ref 4.2–5.8)
RBC # FLD: 15.1 % — HIGH (ref 10.3–14.5)
RH IG SCN BLD-IMP: POSITIVE — SIGNIFICANT CHANGE UP
SODIUM SERPL-SCNC: 144 MMOL/L — SIGNIFICANT CHANGE UP (ref 135–145)
URATE SERPL-MCNC: 7 MG/DL — SIGNIFICANT CHANGE UP (ref 3.4–8.8)
WBC # BLD: 1.9 K/UL — LOW (ref 3.8–10.5)
WBC # FLD AUTO: 1.9 K/UL — LOW (ref 3.8–10.5)

## 2018-11-26 PROCEDURE — 99223 1ST HOSP IP/OBS HIGH 75: CPT

## 2018-11-26 PROCEDURE — 93010 ELECTROCARDIOGRAM REPORT: CPT

## 2018-11-26 RX ORDER — FUROSEMIDE 40 MG
1 TABLET ORAL
Qty: 0 | Refills: 0 | COMMUNITY

## 2018-11-26 RX ORDER — SODIUM CHLORIDE 9 MG/ML
1000 INJECTION INTRAMUSCULAR; INTRAVENOUS; SUBCUTANEOUS
Qty: 0 | Refills: 0 | Status: DISCONTINUED | OUTPATIENT
Start: 2018-11-26 | End: 2018-12-04

## 2018-11-26 RX ORDER — ANTI-THYMOCYTE GLOBULIN,RABBIT 25 MG
0.1 VIAL (EA) INTRAVENOUS ONCE
Qty: 0 | Refills: 0 | Status: DISCONTINUED | OUTPATIENT
Start: 2018-11-26 | End: 2018-11-26

## 2018-11-26 RX ORDER — ALLOPURINOL 300 MG
300 TABLET ORAL DAILY
Qty: 0 | Refills: 0 | Status: DISCONTINUED | OUTPATIENT
Start: 2018-11-26 | End: 2018-11-26

## 2018-11-26 RX ORDER — POTASSIUM CHLORIDE 20 MEQ
10 PACKET (EA) ORAL DAILY
Qty: 0 | Refills: 0 | Status: DISCONTINUED | OUTPATIENT
Start: 2018-11-26 | End: 2018-11-26

## 2018-11-26 RX ORDER — FLUCONAZOLE 150 MG/1
100 TABLET ORAL DAILY
Qty: 0 | Refills: 0 | Status: DISCONTINUED | OUTPATIENT
Start: 2018-11-26 | End: 2018-12-04

## 2018-11-26 RX ORDER — ATORVASTATIN CALCIUM 80 MG/1
10 TABLET, FILM COATED ORAL AT BEDTIME
Qty: 0 | Refills: 0 | Status: DISCONTINUED | OUTPATIENT
Start: 2018-11-26 | End: 2018-12-04

## 2018-11-26 RX ORDER — CYCLOSPORINE 100 MG/1
100 CAPSULE ORAL EVERY 12 HOURS
Qty: 0 | Refills: 0 | Status: DISCONTINUED | OUTPATIENT
Start: 2018-11-26 | End: 2018-12-04

## 2018-11-26 RX ORDER — DIPHENHYDRAMINE HCL 50 MG
50 CAPSULE ORAL EVERY 24 HOURS
Qty: 0 | Refills: 0 | Status: COMPLETED | OUTPATIENT
Start: 2018-11-26 | End: 2018-11-30

## 2018-11-26 RX ORDER — LOSARTAN POTASSIUM 100 MG/1
25 TABLET, FILM COATED ORAL EVERY 12 HOURS
Qty: 0 | Refills: 0 | Status: DISCONTINUED | OUTPATIENT
Start: 2018-11-26 | End: 2018-12-04

## 2018-11-26 RX ORDER — CYCLOSPORINE 100 MG/1
1 CAPSULE ORAL
Qty: 60 | Refills: 0 | OUTPATIENT
Start: 2018-11-26 | End: 2018-12-25

## 2018-11-26 RX ORDER — ELTROMBOPAG OLAMINE 50 MG/1
1 TABLET, FILM COATED ORAL
Qty: 0 | Refills: 0 | COMMUNITY

## 2018-11-26 RX ORDER — ACETAMINOPHEN 500 MG
650 TABLET ORAL EVERY 6 HOURS
Qty: 0 | Refills: 0 | Status: DISCONTINUED | OUTPATIENT
Start: 2018-11-26 | End: 2018-12-04

## 2018-11-26 RX ORDER — ACYCLOVIR SODIUM 500 MG
400 VIAL (EA) INTRAVENOUS EVERY 12 HOURS
Qty: 0 | Refills: 0 | Status: DISCONTINUED | OUTPATIENT
Start: 2018-11-26 | End: 2018-12-04

## 2018-11-26 RX ORDER — ASPIRIN/CALCIUM CARB/MAGNESIUM 324 MG
1 TABLET ORAL
Qty: 0 | Refills: 0 | COMMUNITY

## 2018-11-26 RX ORDER — DOCUSATE SODIUM 100 MG
200 CAPSULE ORAL DAILY
Qty: 0 | Refills: 0 | Status: DISCONTINUED | OUTPATIENT
Start: 2018-11-26 | End: 2018-12-04

## 2018-11-26 RX ORDER — CARVEDILOL PHOSPHATE 80 MG/1
25 CAPSULE, EXTENDED RELEASE ORAL EVERY 12 HOURS
Qty: 0 | Refills: 0 | Status: DISCONTINUED | OUTPATIENT
Start: 2018-11-26 | End: 2018-12-04

## 2018-11-26 RX ORDER — EPINEPHRINE 0.3 MG/.3ML
0.3 INJECTION INTRAMUSCULAR; SUBCUTANEOUS ONCE
Qty: 0 | Refills: 0 | Status: DISCONTINUED | OUTPATIENT
Start: 2018-11-26 | End: 2018-12-04

## 2018-11-26 RX ORDER — LANOLIN ALCOHOL/MO/W.PET/CERES
10 CREAM (GRAM) TOPICAL AT BEDTIME
Qty: 0 | Refills: 0 | Status: DISCONTINUED | OUTPATIENT
Start: 2018-11-26 | End: 2018-12-04

## 2018-11-26 RX ORDER — CYCLOSPORINE 100 MG/1
100 CAPSULE ORAL
Qty: 0 | Refills: 0 | Status: DISCONTINUED | OUTPATIENT
Start: 2018-11-26 | End: 2018-11-26

## 2018-11-26 RX ORDER — ACYCLOVIR SODIUM 500 MG
1 VIAL (EA) INTRAVENOUS
Qty: 60 | Refills: 0 | OUTPATIENT
Start: 2018-11-26 | End: 2018-12-25

## 2018-11-26 RX ORDER — ACETAMINOPHEN 500 MG
650 TABLET ORAL EVERY 24 HOURS
Qty: 0 | Refills: 0 | Status: COMPLETED | OUTPATIENT
Start: 2018-11-26 | End: 2018-11-30

## 2018-11-26 RX ORDER — FUROSEMIDE 40 MG
40 TABLET ORAL DAILY
Qty: 0 | Refills: 0 | Status: DISCONTINUED | OUTPATIENT
Start: 2018-11-26 | End: 2018-12-04

## 2018-11-26 RX ADMIN — Medication 40 MILLIGRAM(S): at 13:42

## 2018-11-26 RX ADMIN — Medication 650 MILLIGRAM(S): at 16:44

## 2018-11-26 RX ADMIN — CARVEDILOL PHOSPHATE 25 MILLIGRAM(S): 80 CAPSULE, EXTENDED RELEASE ORAL at 17:17

## 2018-11-26 RX ADMIN — Medication 1 TABLET(S): at 16:44

## 2018-11-26 RX ADMIN — ATORVASTATIN CALCIUM 10 MILLIGRAM(S): 80 TABLET, FILM COATED ORAL at 22:02

## 2018-11-26 RX ADMIN — Medication 50 MILLIGRAM(S): at 16:44

## 2018-11-26 RX ADMIN — SODIUM CHLORIDE 50 MILLILITER(S): 9 INJECTION INTRAMUSCULAR; INTRAVENOUS; SUBCUTANEOUS at 13:42

## 2018-11-26 RX ADMIN — FLUCONAZOLE 100 MILLIGRAM(S): 150 TABLET ORAL at 16:44

## 2018-11-26 RX ADMIN — Medication 100 MILLIGRAM(S): at 16:43

## 2018-11-26 RX ADMIN — CYCLOSPORINE 100 MILLIGRAM(S): 100 CAPSULE ORAL at 17:18

## 2018-11-26 RX ADMIN — Medication 400 MILLIGRAM(S): at 19:07

## 2018-11-26 RX ADMIN — LOSARTAN POTASSIUM 25 MILLIGRAM(S): 100 TABLET, FILM COATED ORAL at 17:17

## 2018-11-26 RX ADMIN — Medication 10 MILLIGRAM(S): at 22:02

## 2018-11-26 RX ADMIN — Medication 650 MILLIGRAM(S): at 23:12

## 2018-11-26 RX ADMIN — Medication 650 MILLIGRAM(S): at 23:42

## 2018-11-26 NOTE — DISCHARGE NOTE ADULT - PATIENT PORTAL LINK FT
You can access the [facility] Patient Portal, offered by [facility], by registering with the following website: [email or web address]

## 2018-11-26 NOTE — DISCHARGE NOTE ADULT - CARE PROVIDERS DIRECT ADDRESSES
[email or web address]

## 2018-11-26 NOTE — H&P ADULT - MUSCULOSKELETAL
negative No joint pain, swelling or deformity; no limitation of movement details… detailed exam ROM intact

## 2018-11-26 NOTE — DISCHARGE NOTE ADULT - HOME CARE AGENCY
[facility] infusion start of care day after discharge, visiting nurse to  instruct in picc care , an weekly dressing changes, [phone] [place]  infusion start of care day after discharge, visiting nurse to  instruct in picc care , an weekly dressing changes, [phone]

## 2018-11-26 NOTE — DISCHARGE NOTE ADULT - CARE PLAN
Principal Discharge DX:	Aplastic anemia  Goal:	Maintain counts and remain free from infection  Assessment and plan of treatment:	Notify MD and report to the ER for any Temp greater than or equal to 100.4, intractable nausea, vomiting, diarrhea or uncontrollable bleeding  Secondary Diagnosis:	Chronic atrial fibrillation Principal Discharge DX:	Aplastic anemia  Goal:	Maintain counts and remain free from infection  Assessment and plan of treatment:	Notify MD and report to the ER for any Temp greater than or equal to 100.4, intractable nausea, vomiting, diarrhea or uncontrollable bleeding  Secondary Diagnosis:	Chronic atrial fibrillation  Goal:	remain free of symtpms control of rate  Assessment and plan of treatment:	Continue home medication  Follow up outpatient with your Cardiologist as directed.

## 2018-11-26 NOTE — DISCHARGE NOTE ADULT - ADDITIONAL INSTRUCTIONS
To the [facility] to see Dr. Yeager on To [facility] to see DAVEY fair on 12/3/18 @ 3.40 PM  To [facility] for lab check on 12/3/18 @ 4 PM.  To [facility] for lab check on 12/5/18 @ 2 PM  To [facility] for lab check on 12/7/18 @ 2 PM To [facility] for lab check on 12/7/18 @ 2 PM  To [facility] for appt with Dr. Yeager as well as labs on 12/11/18 at 1:20  Please follow up with your Cardiologist to manage your Afib as well as  monitor your ejection fraction once you are home. To [facility] for lab check and possible platelets on 12/7/18 @ 12:30 PM  To [facility] for appt with Dr. Yeager 1:20pm as well as labs and possible platelets 1pm  Please follow up with your Cardiologist to manage your Afib as well as  monitor your ejection fraction once you are home. To [facility] for lab check and possible platelets on 12/7/18 @ 12:30 PM  To [facility] 12/11 for appt with Dr. Yeager 1:20pm as well as labs and possible platelets at 1pm.  Please follow up with your Cardiologist to manage your Afib as well as  monitor your ejection fraction once you are home.

## 2018-11-26 NOTE — H&P ADULT - PROBLEM SELECTOR PLAN 2
The patient is neutropenic  If febrile Pan Cx, CXR and start Cefepime  Continue Acyclovir, Diflucan and Bactrim ppx

## 2018-11-26 NOTE — H&P ADULT - PMH
Pancytopenia    Cardiovascular disease Aplastic anemia    CAD (coronary artery disease)    Cardiomyopathy    Chronic atrial fibrillation    [place] (hard of hearing)  b/l hearing aids  Pancytopenia  current plts 220  PNH (paroxysmal nocturnal hemoglobinuria)  hypercoaguable state  Thoracic aortic aneurysm without rupture  4.8cm 2017

## 2018-11-26 NOTE — H&P ADULT - ASSESSMENT
This is an 84 yo M with PMHx of CAD status post PCI with GREYSON x 4 in 2008, cardiomyopathy (EF 20-25%) with ICD placement in 2014, SDH with evacuation after mechanical fall in 2014 treated at [facility], chronic Afib (no coumadin 2/2 thrombocytopenia) and Aplastic Anemia with PNH subpopulation originally diagnosed in 2013 treated with Horse ATG/CSA/Prednisone now admitted for relapsed disease and treatment with Rabbit ATG/CSA/Prednisone.

## 2018-11-26 NOTE — H&P ADULT - NSHPSOCIALHISTORY_GEN_ALL_CORE
PMH: MI, s/p stents x 4 (-5 years ago)   PSH: stent placement  ALL: nkda   FH: 1 daughter with RA, 1 healthy daughter and one healthy son. no known blood disorder or malignany in parents   SH: stopped social drinking 2 weeks ago, no history of tobacco use. 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 FH: 1 daughter with RA, 1 healthy daughter and one healthy son. no known blood disorder or malignancy in parents   SH: stopped social drinking 1 year ago, no history of tobacco use. 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

## 2018-11-26 NOTE — DISCHARGE NOTE ADULT - CARE PROVIDER_API CALL
Jad Yeager), Hematology; Internal Medicine; Medical Oncology  [address] [place] [address]  Phone: [phone]  Fax: [phone]

## 2018-11-26 NOTE — H&P ADULT - LYMPHATIC
posterior cervical L/anterior cervical L/inguinal R/posterior cervical R/supraclavicular L/inguinal L/femoral L/supraclavicular R/anterior cervical R/axillary L/axillary R/femoral R

## 2018-11-26 NOTE — DISCHARGE NOTE ADULT - HOSPITAL COURSE
This is an 84 yo M with PMHx of CAD status post PCI with GREYSON x 4 in 2008, cardiomyopathy (EF 20-25%) with ICD placement in 2014, SDH with evacuation after mechanical fall in 2014 treated at [facility], chronic Afib (no coumadin 2/2 thrombocytopenia) and Aplastic Anemia with PNH subpopulation originally diagnosed in 2013 treated with Horse ATG/CSA/Prednisone now admitted for relapsed disease and treatment with Rabbit ATG/CSA/Prednisone. This is an 82 yo M with PMHx of CAD status post PCI with GREYSON x 4 in 2008, cardiomyopathy (EF 20-25%) with ICD placement in 2014, SDH with evacuation after mechanical fall in 2014 treated at [facility], chronic Afib (no coumadin 2/2 thrombocytopenia) and Aplastic Anemia with PNH subpopulation originally diagnosed in 2013 treated with Horse ATG/CSA/Prednisone now admitted for relapsed disease and treatment with Rabbit ATG/CSA/Prednisone.  Patient developed acute renal insufficiency on 11/28, attributed to Cyclosporine and Lasix. Renal function was monitored closely. On 11/29 Cyclosporine level was noted to be 59 (subtherapeutic). It was decided that patient would continue on Cyclosporine at 100mg twice daily. Patient was noted to be neutropenic on 11/29, was started on prophylactic Levaquin. This is an 84 yo M with PMHx of CAD status post PCI with GREYSON x 4 in 2008, cardiomyopathy (EF 20-25%) with ICD placement in 2014, SDH with evacuation after mechanical fall in 2014 treated at [facility], chronic Afib (no coumadin 2/2 thrombocytopenia) and Aplastic Anemia with PNH subpopulation originally diagnosed in 2013 treated with Horse ATG/CSA/Prednisone now admitted for relapsed disease and treatment with Rabbit ATG/CSA/Prednisone.  Patient developed acute renal insufficiency on 11/28, attributed to Cyclosporine and Lasix. Renal function was monitored closely. On 11/29 Cyclosporine level was noted to be 59 (subtherapeutic). It was decided that patient would continue on Cyclosporine at 100mg twice daily. Patient was noted to be neutropenic on 11/29, was started on prophylactic Levaquin.     On 12/2 patient complained of headaches. A CT of the head was performed, which was (-) for acute pathology. Patient was scheduled for PICC line placement and platelets were transfused for a goal platelet of >50,000. This is an 82 yo M with PMHx of CAD status post PCI with GREYSON x 4 in 2008, cardiomyopathy (EF 20-25%) with ICD placement in 2014, SDH with evacuation after mechanical fall in 2014 treated at [facility], chronic Afib (no coumadin 2/2 thrombocytopenia) and Aplastic Anemia with PNH subpopulation originally diagnosed in 2013 treated with Horse ATG/CSA/Prednisone now admitted for relapsed disease and treatment with Rabbit ATG/CSA/Prednisone.  Patient developed acute renal insufficiency on 11/28, attributed to Cyclosporine and Lasix. Renal function was monitored closely. On 11/29 Cyclosporine level was noted to be 59 (subtherapeutic). It was decided that patient would continue on Cyclosporine at 100mg twice daily. Patient was noted to be neutropenic on 11/29, was started on prophylactic Levaquin.     On 12/2 patient complained of headaches. A CT of the head was performed, which was (-) for acute pathology. Patient was scheduled for PICC line placement and platelets were transfused for a goal platelet of >50,000. Patient platelet count was stable on 12/4 and was discharged home with follow up appointments.

## 2018-11-26 NOTE — H&P ADULT - HISTORY OF PRESENT ILLNESS
Patient denies headache, dizziness, visual changes, chest pain, palpitations, SOB, abdominal pain, nausea, vomiting, diarrhea or dysuria. This is an 82 yo M with PMHx of CAD s/p PCI with GREYSON x  in 2008, cardiomyopathy  with EF 20-25% s/p ICD placement in 2014, mechanical fall with resulting SDH and evacuation fall in 2014 at [facility], chronic afib - no coumadin 2/2 thrombocytopenia and Aplastic Anemia with PNH subpopulation admitted for rabbit ATG/CSA.    The patients history dates back to 6/2013 when the patient was initially treated with Hourse ATG/CSA/Prednisone. The patient was continued on CSA and Prednisone through 6/2014 then when platelets began to drop in 7/2015 the patient was supected of relapse and CSA was restarted through 10/2015. At that time the patient was not responding well to CSA following relapse and was found to have worsening renal function despite tapering dose and Promacta was then started. 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 The patient was then sent for a colonoscopy/capsule endoscopy that revealed a small bowel lesion suspicious for malignancy. The patient had an exploratory laparotomy with small bowel resection.  Following normalization of platelets the platelet count began to drop slowly and the patient became thrombocytopenic        Currently the patients PNH screen is normal and last BM bx completed on 1/18/18 shows decreased megakaryocytes. Currently the patient denies dizziness, visual changes, chest pain, palpitations, SOB, abdominal pain, nausea, vomiting, diarrhea, melana, hematuria or dysuria. This is an 84 yo M with PMHx of CAD status post PCI with GREYSON x 4 in 2008, cardiomyopathy (EF 20-25%) with ICD placement in 2014, SDH and evacuation after mechanical fall in 2014 treated at [facility], chronic Afib (no coumadin 2/2 thrombocytopenia) and Aplastic Anemia with PNH subpopulation originally diagnosed in 2013 treated initially with Horse ATG/CSA/Prednisone now admitted for Rabbit ATG/CSA/Prednisone.    The patients history dates back to June of 2013 when he was initially diagnosed and treated with Horse ATG/CSA/Prednisone. The patient was continued on CSA and Prednisone through June of 2014 and then stopped. The patients platelets began to drop in July of 2015 and he was suspected of relapsed disease. At that time CSA was restarted through October of 2015. At that time the patient was not responding well to CSA and was found to have worsening renal function despite tapering dose and Promacta was then started. 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 The patient was then sent for a colonoscopy/capsule endoscopy that revealed a small bowel lesion suspicious for malignancy. The patient had an exploratory laparotomy with small bowel resection. In 10/10/17 following normalization of platelets the platelet count began to drop slowly and the patient became thrombocytopenic.      Currently the patients PNH screen is normal and last BM bx completed on 1018/18 shows decreased megakaryocytes. At time of admission the patient denies dizziness, visual changes, chest pain, palpitations, SOB, abdominal pain, nausea, vomiting, diarrhea, melana, hematuria or dysuria. This is an 82 yo M with PMHx of CAD status post PCI with GREYSON x 4 in 2008, cardiomyopathy (EF 20-25%) with ICD placement in 2014, SDH with evacuation after mechanical fall in 2014 treated at [facility], chronic Afib (no coumadin 2/2 thrombocytopenia) and Aplastic Anemia with PNH subpopulation originally diagnosed in 2013 treated with Horse ATG/CSA/Prednisone now admitted for relapsed disease and treatment with Rabbit ATG/CSA/Prednisone.    The patients history dates back to June of 2013 when he was initially diagnosed and treated with Horse ATG/CSA/Prednisone. The patient was continued on CSA and Prednisone through June of 2014 and then stopped for partial remission. The patients platelets began to drop in July of 2015 and he was suspected of relapsed disease. At that time CSA was restarted through October of 2015. At that time the patient was not responding well to CSA and was found to have worsening renal function despite tapering dose and Promacta was then started. 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 The patient was then sent for a colonoscopy/capsule endoscopy that revealed a small bowel lesion suspicious for malignancy. The patient then had an exploratory laparotomy with small bowel resection. In 10/10/17 following normalization of platelets the platelet count began to drop slowly and the patient became thrombocytopenic. Promacta was discontinued on 11/13/18. The patients latest PNH screen is normal and last BM bx completed on 1018/18 shows decreased megakaryocytes.     Upon admission the patient denies dizziness, visual changes, chest pain, palpitations, SOB, abdominal pain, nausea, vomiting, diarrhea, melena hematuria or dysuria.

## 2018-11-26 NOTE — DISCHARGE NOTE ADULT - PLAN OF CARE
Maintain counts and remain free from infection Notify MD and report to the ER for any Temp greater than or equal to 100.4, intractable nausea, vomiting, diarrhea or uncontrollable bleeding remain free of symtpms control of rate Continue home medication  Follow up outpatient with your Cardiologist as directed.

## 2018-11-26 NOTE — H&P ADULT - PROBLEM SELECTOR PLAN 1
For Rabbit ATG/CSA/Prednisone  Monitor CBC/Lytes and transfuse/replete PRN For Rabbit ATG/CSA/Prednisone  Monitor CBC/Lytes and transfuse/replete PRN  Strict Is and Os/Daily weights/Mouth Care  Allopurinol 300mg PO daily  IVF For Rabbit ATG/CSA/Prednisone  CSA to start at 100mg PO BID for previous renal insufficiency, monitor levels  Monitor CBC/Lytes and transfuse/replete PRN  Strict Is and Os/Daily weights/Mouth Care  Allopurinol 300mg PO daily  IVF For Rabbit ATG/CSA/Prednisone  CSA to start at 100mg PO BID for previous renal insufficiency, monitor levels  Monitor CBC/Lytes and transfuse/replete PRN  Strict Is and Os/Daily weights/Mouth Care  IVF

## 2018-11-26 NOTE — H&P ADULT - PROBLEM SELECTOR PLAN 4
Continue Lasix 40mg PO daily with KCL supplementation  Continue Coreg and Cozaar Last known EF 25% in 2016  Continue Lasix 40mg PO daily with KCL supplementation  Continue Coreg and Cozaar

## 2018-11-26 NOTE — DISCHARGE NOTE ADULT - MEDICATION SUMMARY - MEDICATIONS TO TAKE
I will START or STAY ON the medications listed below when I get home from the hospital:    losartan 25 mg oral tablet  -- 1 tab(s) by mouth 2 times a day  -- Indication: For Hypertension    Diflucan 100 mg oral tablet  -- 1 tab(s) by mouth once a day  -- Indication: For Prophylactic measure    loratadine 10 mg oral tablet  -- 1 tab(s) by mouth once a day  -- Indication: For Prophylactic measure    acyclovir 400 mg oral tablet  -- 1 tab(s) by mouth every 12 hours MDD:2  -- Indication: For Antiviral prophylaxis    carvedilol 25 mg oral tablet  -- 1 tab(s) by mouth 2 times a day  -- Indication: For Hypertension     Lasix 40 mg oral tablet  -- 1 tab(s) by mouth once a day  -- Indication: For Diuretic    cycloSPORINE 100 mg/mL oral solution  -- 1 milliliter(s) by mouth every 12 hours MDD:2  -- Indication: For Aplastic anemia    Colace 100 mg oral capsule  -- 2 cap(s) by mouth once a day  -- Indication: For Bowel regimen     potassium chloride 10 mEq oral tablet, extended release  -- 1 tab(s) by mouth once a day  -- Indication: For supplementation     Melatonin 10 mg oral capsule  -- 1 cap(s) by mouth once a day (at bedtime)  -- Indication: For Insomnia     levoFLOXacin 250 mg oral tablet  -- 1 tab(s) by mouth every 24 hours  -- Indication: For Prophylactic measure    sulfamethoxazole-trimethoprim 800 mg-160 mg oral tablet  -- 1 tab(s) by mouth Monday, Wednesday, and Friday MDD:1  -- Indication: For Prophylactic measure

## 2018-11-27 DIAGNOSIS — I25.10 ATHEROSCLEROTIC HEART DISEASE OF NATIVE CORONARY ARTERY WITHOUT ANGINA PECTORIS: ICD-10-CM

## 2018-11-27 LAB
ALBUMIN SERPL ELPH-MCNC: 3.8 G/DL — SIGNIFICANT CHANGE UP (ref 3.3–5)
ALP SERPL-CCNC: 72 U/L — SIGNIFICANT CHANGE UP (ref 40–120)
ALT FLD-CCNC: 14 U/L — SIGNIFICANT CHANGE UP (ref 10–45)
ANION GAP SERPL CALC-SCNC: 14 MMOL/L — SIGNIFICANT CHANGE UP (ref 5–17)
AST SERPL-CCNC: 21 U/L — SIGNIFICANT CHANGE UP (ref 10–40)
BASOPHILS # BLD AUTO: 0 K/UL — SIGNIFICANT CHANGE UP (ref 0–0.2)
BASOPHILS NFR BLD AUTO: 0 % — SIGNIFICANT CHANGE UP (ref 0–2)
BILIRUB SERPL-MCNC: 0.6 MG/DL — SIGNIFICANT CHANGE UP (ref 0.2–1.2)
BUN SERPL-MCNC: 39 MG/DL — HIGH (ref 7–23)
CALCIUM SERPL-MCNC: 8.6 MG/DL — SIGNIFICANT CHANGE UP (ref 8.4–10.5)
CHLORIDE SERPL-SCNC: 102 MMOL/L — SIGNIFICANT CHANGE UP (ref 96–108)
CO2 SERPL-SCNC: 23 MMOL/L — SIGNIFICANT CHANGE UP (ref 22–31)
CREAT SERPL-MCNC: 1.32 MG/DL — HIGH (ref 0.5–1.3)
EOSINOPHIL # BLD AUTO: 0 K/UL — SIGNIFICANT CHANGE UP (ref 0–0.5)
EOSINOPHIL NFR BLD AUTO: 0 % — SIGNIFICANT CHANGE UP (ref 0–6)
GLUCOSE SERPL-MCNC: 199 MG/DL — HIGH (ref 70–99)
HCT VFR BLD CALC: 31.2 % — LOW (ref 39–50)
HGB BLD-MCNC: 10.9 G/DL — LOW (ref 13–17)
LDH SERPL L TO P-CCNC: 174 U/L — SIGNIFICANT CHANGE UP (ref 50–242)
LYMPHOCYTES # BLD AUTO: 0.1 K/UL — LOW (ref 1–3.3)
MAGNESIUM SERPL-MCNC: 1.8 MG/DL — SIGNIFICANT CHANGE UP (ref 1.6–2.6)
MCHC RBC-ENTMCNC: 34.7 GM/DL — SIGNIFICANT CHANGE UP (ref 32–36)
MCHC RBC-ENTMCNC: 35.2 PG — HIGH (ref 27–34)
MCV RBC AUTO: 101 FL — HIGH (ref 80–100)
MONOCYTES # BLD AUTO: 0.1 K/UL — SIGNIFICANT CHANGE UP (ref 0–0.9)
MONOCYTES NFR BLD AUTO: 6 % — SIGNIFICANT CHANGE UP (ref 2–14)
NEUTROPHILS # BLD AUTO: 1.1 K/UL — LOW (ref 1.8–7.4)
NEUTROPHILS NFR BLD AUTO: 84 % — HIGH (ref 43–77)
PHOSPHATE SERPL-MCNC: 2 MG/DL — LOW (ref 2.5–4.5)
PLATELET # BLD AUTO: 8 K/UL — CRITICAL LOW (ref 150–400)
POTASSIUM SERPL-MCNC: 4.1 MMOL/L — SIGNIFICANT CHANGE UP (ref 3.5–5.3)
POTASSIUM SERPL-SCNC: 4.1 MMOL/L — SIGNIFICANT CHANGE UP (ref 3.5–5.3)
PROT SERPL-MCNC: 6.6 G/DL — SIGNIFICANT CHANGE UP (ref 6–8.3)
RBC # BLD: 3.08 M/UL — LOW (ref 4.2–5.8)
RBC # FLD: 15.6 % — HIGH (ref 10.3–14.5)
SODIUM SERPL-SCNC: 139 MMOL/L — SIGNIFICANT CHANGE UP (ref 135–145)
URATE SERPL-MCNC: 6.8 MG/DL — SIGNIFICANT CHANGE UP (ref 3.4–8.8)
WBC # BLD: 1.2 K/UL — LOW (ref 3.8–10.5)
WBC # FLD AUTO: 1.2 K/UL — LOW (ref 3.8–10.5)

## 2018-11-27 PROCEDURE — 99232 SBSQ HOSP IP/OBS MODERATE 35: CPT | Mod: GC

## 2018-11-27 RX ORDER — SIMETHICONE 80 MG/1
80 TABLET, CHEWABLE ORAL ONCE
Qty: 0 | Refills: 0 | Status: COMPLETED | OUTPATIENT
Start: 2018-11-27 | End: 2018-11-27

## 2018-11-27 RX ORDER — LORATADINE 10 MG/1
10 TABLET ORAL DAILY
Qty: 0 | Refills: 0 | Status: DISCONTINUED | OUTPATIENT
Start: 2018-11-27 | End: 2018-12-04

## 2018-11-27 RX ORDER — POTASSIUM PHOSPHATE, MONOBASIC POTASSIUM PHOSPHATE, DIBASIC 236; 224 MG/ML; MG/ML
15 INJECTION, SOLUTION INTRAVENOUS ONCE
Qty: 0 | Refills: 0 | Status: COMPLETED | OUTPATIENT
Start: 2018-11-27 | End: 2018-11-27

## 2018-11-27 RX ADMIN — Medication 650 MILLIGRAM(S): at 15:35

## 2018-11-27 RX ADMIN — Medication 650 MILLIGRAM(S): at 17:27

## 2018-11-27 RX ADMIN — ATORVASTATIN CALCIUM 10 MILLIGRAM(S): 80 TABLET, FILM COATED ORAL at 21:51

## 2018-11-27 RX ADMIN — SODIUM CHLORIDE 50 MILLILITER(S): 9 INJECTION INTRAMUSCULAR; INTRAVENOUS; SUBCUTANEOUS at 17:34

## 2018-11-27 RX ADMIN — CYCLOSPORINE 100 MILLIGRAM(S): 100 CAPSULE ORAL at 06:24

## 2018-11-27 RX ADMIN — POTASSIUM PHOSPHATE, MONOBASIC POTASSIUM PHOSPHATE, DIBASIC 62.5 MILLIMOLE(S): 236; 224 INJECTION, SOLUTION INTRAVENOUS at 13:51

## 2018-11-27 RX ADMIN — Medication 40 MILLIGRAM(S): at 06:23

## 2018-11-27 RX ADMIN — CARVEDILOL PHOSPHATE 25 MILLIGRAM(S): 80 CAPSULE, EXTENDED RELEASE ORAL at 17:31

## 2018-11-27 RX ADMIN — Medication 400 MILLIGRAM(S): at 17:31

## 2018-11-27 RX ADMIN — LORATADINE 10 MILLIGRAM(S): 10 TABLET ORAL at 15:10

## 2018-11-27 RX ADMIN — CYCLOSPORINE 100 MILLIGRAM(S): 100 CAPSULE ORAL at 17:31

## 2018-11-27 RX ADMIN — Medication 10 MILLIGRAM(S): at 21:51

## 2018-11-27 RX ADMIN — FLUCONAZOLE 100 MILLIGRAM(S): 150 TABLET ORAL at 11:39

## 2018-11-27 RX ADMIN — CARVEDILOL PHOSPHATE 25 MILLIGRAM(S): 80 CAPSULE, EXTENDED RELEASE ORAL at 06:24

## 2018-11-27 RX ADMIN — LOSARTAN POTASSIUM 25 MILLIGRAM(S): 100 TABLET, FILM COATED ORAL at 17:31

## 2018-11-27 RX ADMIN — Medication 400 MILLIGRAM(S): at 06:23

## 2018-11-27 RX ADMIN — Medication 50 MILLIGRAM(S): at 15:32

## 2018-11-27 RX ADMIN — SIMETHICONE 80 MILLIGRAM(S): 80 TABLET, CHEWABLE ORAL at 05:11

## 2018-11-27 RX ADMIN — Medication 100 MILLIGRAM(S): at 16:07

## 2018-11-27 RX ADMIN — LOSARTAN POTASSIUM 25 MILLIGRAM(S): 100 TABLET, FILM COATED ORAL at 06:23

## 2018-11-27 RX ADMIN — Medication 200 MILLIGRAM(S): at 11:40

## 2018-11-27 NOTE — PROGRESS NOTE ADULT - PROBLEM SELECTOR PLAN 4
Prior stenting  Last known EF 25% in 2016  Continue Lasix 40mg PO daily with KCL supplementation  Continue Coreg 25mg BID and Cozaar.   EKG showing Afib rate controlled

## 2018-11-27 NOTE — PROGRESS NOTE ADULT - PROBLEM SELECTOR PLAN 3
The patient is neutropenic  If febrile Pan Cx, CXR and start Cefepime  Continue Acyclovir, Diflucan and Bactrim ppx.

## 2018-11-27 NOTE — CHART NOTE - NSCHARTNOTEFT_GEN_A_CORE
Notified by RN about pt c/o chills. Pt seen at the bedside. Pt states he feels better and chills are subsiding. Pt is on ATG infusion and running at 100 c/hr. Pt previously had experienced chills on 11/26 when ATG infusion was up titrated to 6 cc/hr. The infusion was held for an hour and restarted at 8 pm. Pt denies SOB, dyspnea, CP, weakness, numbness, tingling, hallucinations, auditory disturbances. Pt AO x 4     ICU Vital Signs Last 24 Hrs  T(C): 36.2 (27 Nov 2018 03:30), Max: 37.7 (26 Nov 2018 20:30)  T(F): 97.1 (27 Nov 2018 03:30), Max: 99.9 (26 Nov 2018 20:30)  HR: 110 (27 Nov 2018 03:30) (76 - 110)  BP: 122/78 (27 Nov 2018 03:30) (99/57 - 162/93)  BP(mean): --  ABP: --  ABP(mean): --  RR: 18 (27 Nov 2018 03:30) (16 - 18)  SpO2: 98% (27 Nov 2018 03:30) (93% - 99%)    A&P  This is an 82 yo M with PMHx of CAD status post PCI with GREYSON x 4 in 2008, cardiomyopathy (EF 20-25%) with ICD placement in 2014, SDH with evacuation after mechanical fall in 2014 treated at [facility], chronic Afib (no coumadin 2/2 thrombocytopenia) and Aplastic Anemia with PNH subpopulation originally diagnosed in 2013 treated with Horse ATG/CSA/Prednisone now admitted for relapsed disease and treatment with Rabbit ATG/CSA/Prednisone.    The patients history dates back to June of 2013 when he was initially diagnosed and treated with Horse ATG/CSA/Prednisone. The patient was continued on CSA and Prednisone through June of 2014 and then stopped for partial remission. The patients platelets began to drop in July of 2015 and he was suspected of relapsed disease. At that time CSA was restarted through October of 2015. At that time the patient was not responding well to CSA and was found to have worsening renal function despite tapering dose and Promacta was then started. 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 The patient was then sent for a colonoscopy/capsule endoscopy that revealed a small bowel lesion suspicious for malignancy. The patient then had an exploratory laparotomy with small bowel resection. In 10/10/17 following normalization of platelets the platelet count began to drop slowly and the patient became thrombocytopenic. Promacta was discontinued on 11/13/18. The patients latest PNH screen is normal and last BM bx completed on 1018/18 shows decreased megakaryocytes.     Upon admission the patient denies dizziness, visual changes, chest pain, palpitations, SOB, abdominal pain, nausea, vomiting, diarrhea, melena hematuria or dysuria. (26 Nov 2018 10:39)    Chills 2/2 ATG infusion   - will hold infusion for an hour and re assess  D/w on call heme/onc; will restart the infusion at 40 cc/hr and monitor.     Key FLETCHER  #[id number]

## 2018-11-27 NOTE — PROGRESS NOTE ADULT - ASSESSMENT
This is an 82 yo M with PMHx of CAD status post PCI with GREYSON x 4 in 2008, cardiomyopathy (EF 20-25%) with ICD placement in 2014, SDH with evacuation after mechanical fall in 2014 treated at [facility], chronic Afib (no coumadin 2/2 thrombocytopenia) and Aplastic Anemia with PNH subpopulation originally diagnosed in 2013 treated with Horse ATG/CSA/Prednisone now admitted for relapsed disease and treatment with Rabbit ATG/CSA/Prednisone. Promacta was discontinued on 11/13/18. The patients latest PNH screen is normal and last BM bx completed on 1018/18 shows decreased megakaryocytes.             .

## 2018-11-27 NOTE — PROGRESS NOTE ADULT - PROBLEM SELECTOR PLAN 1
Rabbit ATG/CSA/Prednisone  CSA to start at 100mg PO BID for previous renal insufficiency, monitor levels  FU level Thurs 11/29  Monitor CBC/Lytes and transfuse/replete PRN  Strict Is and Os/Daily weights/Mouth Care Rabbit ATG/CSA/Prednisone  CSA to start at 100mg PO BID for previous renal insufficiency, monitor levels  FU level Thurs 11/29  Monitor CBC/Lytes and transfuse/replete PRN  Strict Is and Os/Daily weights/Mouth Care  Thrombocytopenia-replace plt.   Hypophosphatemia-replace phos

## 2018-11-27 NOTE — PROGRESS NOTE ADULT - ATTENDING COMMENTS
84yo M with hx AA in 2013 s/p equine ATG/CSA/Prednisone/Promacta, admitted on 11/26 for relapsed AA -started on rabbit ATG/CSA/Prednisone day 2    -monitor counts, transfuse PRN  -cont prophylaxis w/ Diflucan/Bactrim/Acyclovir  -monitor for fevers, monitor for reactions to ATG  -will check CSA level on day 4 in AM

## 2018-11-27 NOTE — PROGRESS NOTE ADULT - SUBJECTIVE AND OBJECTIVE BOX
Diagnosis:    Protocol/Chemo Regimen:    Day:     Pt endorsed:    Review of Systems:     Pain scale:     Diet:     Allergies    No Known Allergies    Intolerances        ANTIMICROBIALS  acyclovir   Oral Tab/Cap 400 milliGRAM(s) Oral every 12 hours  fluconAZOLE   Tablet 100 milliGRAM(s) Oral daily  trimethoprim  160 mG/sulfamethoxazole 800 mG 1 Tablet(s) Oral <User Schedule>      HEME/ONC MEDICATIONS      STANDING MEDICATIONS  acetaminophen   Tablet .. 650 milliGRAM(s) Oral every 24 hours  antithymocyte globulin rabbit IVPB 364 milliGRAM(s) IV Intermittent every 24 hours  atorvastatin 10 milliGRAM(s) Oral at bedtime  carvedilol 25 milliGRAM(s) Oral every 12 hours  cycloSPORINE  (SandIMMUNE)  Solution 100 milliGRAM(s) Oral every 12 hours  diphenhydrAMINE   Injectable 50 milliGRAM(s) IV Push every 24 hours  docusate sodium 200 milliGRAM(s) Oral daily  EPINEPHrine     1 mG/mL Injectable 0.3 milliGRAM(s) IntraMuscular once  furosemide    Tablet 40 milliGRAM(s) Oral daily  losartan 25 milliGRAM(s) Oral every 12 hours  melatonin 10 milliGRAM(s) Oral at bedtime  predniSONE   Tablet 100 milliGRAM(s) Oral every 24 hours  sodium chloride 0.9%. 1000 milliLiter(s) IV Continuous <Continuous>      PRN MEDICATIONS  acetaminophen   Tablet .. 650 milliGRAM(s) Oral every 6 hours PRN        Vital Signs Last 24 Hrs  T(C): 36.8 (27 Nov 2018 05:00), Max: 37.7 (26 Nov 2018 20:30)  T(F): 98.3 (27 Nov 2018 05:00), Max: 99.9 (26 Nov 2018 20:30)  HR: 102 (27 Nov 2018 07:31) (76 - 110)  BP: 118/60 (27 Nov 2018 07:31) (99/57 - 162/93)  BP(mean): --  RR: 18 (27 Nov 2018 05:00) (16 - 18)  SpO2: 96% (27 Nov 2018 05:00) (93% - 99%)    PHYSICAL EXAM  General: NAD  HEENT: clear oropharynx, anicteric sclera, pink conjunctiva  Neck: supple  CV: (+) S1/S2 RRR  Lungs: positive air movement b/l ant lungs, clear to auscultation, no wheezes, no rales  Abdomen: soft, non-tender, non-distended  Ext: no clubbing cyanosis or edema  Skin: no rashes and no petechiae  Neuro: alert and oriented X 3, no focal deficits  Central Line:     RECENT CULTURES:        LABS: Diagnosis: Aplastic Anemia    Protocol/Chemo Regimen: Rabbit ATG, CSA, Prednisone    Day: 2    Pt endorsed:no complaints    Review of Systems: Denies N/V/D, CP, SOB, HA.     Pain scale: Denies    Diet: Regular    Allergies    No Known Allergies    Intolerances        ANTIMICROBIALS  acyclovir   Oral Tab/Cap 400 milliGRAM(s) Oral every 12 hours  fluconAZOLE   Tablet 100 milliGRAM(s) Oral daily  trimethoprim  160 mG/sulfamethoxazole 800 mG 1 Tablet(s) Oral <User Schedule>      HEME/ONC MEDICATIONS      STANDING MEDICATIONS  acetaminophen   Tablet .. 650 milliGRAM(s) Oral every 24 hours  antithymocyte globulin rabbit IVPB 364 milliGRAM(s) IV Intermittent every 24 hours  atorvastatin 10 milliGRAM(s) Oral at bedtime  carvedilol 25 milliGRAM(s) Oral every 12 hours  cycloSPORINE  (SandIMMUNE)  Solution 100 milliGRAM(s) Oral every 12 hours  diphenhydrAMINE   Injectable 50 milliGRAM(s) IV Push every 24 hours  docusate sodium 200 milliGRAM(s) Oral daily  EPINEPHrine     1 mG/mL Injectable 0.3 milliGRAM(s) IntraMuscular once  furosemide    Tablet 40 milliGRAM(s) Oral daily  losartan 25 milliGRAM(s) Oral every 12 hours  melatonin 10 milliGRAM(s) Oral at bedtime  predniSONE   Tablet 100 milliGRAM(s) Oral every 24 hours  sodium chloride 0.9%. 1000 milliLiter(s) IV Continuous <Continuous>      PRN MEDICATIONS  acetaminophen   Tablet .. 650 milliGRAM(s) Oral every 6 hours PRN        Vital Signs Last 24 Hrs  T(C): 36.8 (27 Nov 2018 05:00), Max: 37.7 (26 Nov 2018 20:30)  T(F): 98.3 (27 Nov 2018 05:00), Max: 99.9 (26 Nov 2018 20:30)  HR: 102 (27 Nov 2018 07:31) (76 - 110)  BP: 118/60 (27 Nov 2018 07:31) (99/57 - 162/93)  BP(mean): --  RR: 18 (27 Nov 2018 05:00) (16 - 18)  SpO2: 96% (27 Nov 2018 05:00) (93% - 99%)    PHYSICAL EXAM  General: NAD  HEENT: clear oropharynx  CV: (+) S1/S2 RRR  Lungs: positive air movement b/l ant lungs, clear to auscultation, no wheezes, no rales  Abdomen: soft, non-tender, non-distended  Ext: +1 edema  Skin: no rashes and no petechiae  Neuro: alert and oriented X 3, no focal deficits  Central Line: PIV    RECENT CULTURES:        LABS:    Blood Cultures:                           10.9   1.2   )-----------( 8        ( 27 Nov 2018 07:07 )             31.2         Mean Cell Volume : 101.0 fl  Mean Cell Hemoglobin : 35.2 pg  Mean Cell Hemoglobin Concentration : 34.7 gm/dL  Auto Neutrophil # : 1.1 K/uL  Auto Lymphocyte # : 0.1 K/uL  Auto Monocyte # : 0.1 K/uL  Auto Eosinophil # : 0.0 K/uL  Auto Basophil # : 0.0 K/uL  Auto Neutrophil % : 84.0 %  Auto Lymphocyte % : x  Auto Monocyte % : 6.0 %  Auto Eosinophil % : 0.0 %  Auto Basophil % : 0.0 %      11-27    139  |  102  |  39<H>  ----------------------------<  199<H>  4.1   |  23  |  1.32<H>    Ca    8.6      27 Nov 2018 07:07  Phos  2.0     11-27  Mg     1.8     11-27    TPro  6.6  /  Alb  3.8  /  TBili  0.6  /  DBili  x   /  AST  21  /  ALT  14  /  AlkPhos  72  11-27      Mg 1.8  Phos 2.0      PT/INR - ( 26 Nov 2018 13:11 )   PT: 11.8 sec;   INR: 1.03 ratio         PTT - ( 26 Nov 2018 13:11 )  PTT:28.0 sec      Uric Acid 6.8

## 2018-11-28 ENCOUNTER — APPOINTMENT (OUTPATIENT)
Dept: HEMATOLOGY ONCOLOGY | Facility: CLINIC | Age: 83
End: 2018-11-28

## 2018-11-28 LAB
ALBUMIN SERPL ELPH-MCNC: 3.6 G/DL — SIGNIFICANT CHANGE UP (ref 3.3–5)
ALP SERPL-CCNC: 62 U/L — SIGNIFICANT CHANGE UP (ref 40–120)
ALT FLD-CCNC: 16 U/L — SIGNIFICANT CHANGE UP (ref 10–45)
ANION GAP SERPL CALC-SCNC: 11 MMOL/L — SIGNIFICANT CHANGE UP (ref 5–17)
AST SERPL-CCNC: 37 U/L — SIGNIFICANT CHANGE UP (ref 10–40)
BASOPHILS # BLD AUTO: 0 K/UL — SIGNIFICANT CHANGE UP (ref 0–0.2)
BILIRUB SERPL-MCNC: 0.5 MG/DL — SIGNIFICANT CHANGE UP (ref 0.2–1.2)
BUN SERPL-MCNC: 47 MG/DL — HIGH (ref 7–23)
CALCIUM SERPL-MCNC: 8.6 MG/DL — SIGNIFICANT CHANGE UP (ref 8.4–10.5)
CHLORIDE SERPL-SCNC: 104 MMOL/L — SIGNIFICANT CHANGE UP (ref 96–108)
CO2 SERPL-SCNC: 23 MMOL/L — SIGNIFICANT CHANGE UP (ref 22–31)
CREAT SERPL-MCNC: 1.38 MG/DL — HIGH (ref 0.5–1.3)
EOSINOPHIL # BLD AUTO: 0.1 K/UL — SIGNIFICANT CHANGE UP (ref 0–0.5)
GLUCOSE SERPL-MCNC: 185 MG/DL — HIGH (ref 70–99)
HCT VFR BLD CALC: 29.5 % — LOW (ref 39–50)
HGB BLD-MCNC: 10.4 G/DL — LOW (ref 13–17)
LDH SERPL L TO P-CCNC: 180 U/L — SIGNIFICANT CHANGE UP (ref 50–242)
LYMPHOCYTES # BLD AUTO: 0 K/UL — LOW (ref 1–3.3)
LYMPHOCYTES # BLD AUTO: 4 % — LOW (ref 13–44)
MAGNESIUM SERPL-MCNC: 2.1 MG/DL — SIGNIFICANT CHANGE UP (ref 1.6–2.6)
MCHC RBC-ENTMCNC: 35.1 GM/DL — SIGNIFICANT CHANGE UP (ref 32–36)
MCHC RBC-ENTMCNC: 35.5 PG — HIGH (ref 27–34)
MCV RBC AUTO: 101 FL — HIGH (ref 80–100)
MONOCYTES # BLD AUTO: 0 K/UL — SIGNIFICANT CHANGE UP (ref 0–0.9)
MONOCYTES NFR BLD AUTO: 8 % — SIGNIFICANT CHANGE UP (ref 2–14)
NEUTROPHILS # BLD AUTO: 0.7 K/UL — LOW (ref 1.8–7.4)
NEUTROPHILS NFR BLD AUTO: 76 % — SIGNIFICANT CHANGE UP (ref 43–77)
PHOSPHATE SERPL-MCNC: 3.7 MG/DL — SIGNIFICANT CHANGE UP (ref 2.5–4.5)
PLATELET # BLD AUTO: 21 K/UL — LOW (ref 150–400)
POTASSIUM SERPL-MCNC: 4.2 MMOL/L — SIGNIFICANT CHANGE UP (ref 3.5–5.3)
POTASSIUM SERPL-SCNC: 4.2 MMOL/L — SIGNIFICANT CHANGE UP (ref 3.5–5.3)
PROT SERPL-MCNC: 6.4 G/DL — SIGNIFICANT CHANGE UP (ref 6–8.3)
RBC # BLD: 2.92 M/UL — LOW (ref 4.2–5.8)
RBC # FLD: 15.9 % — HIGH (ref 10.3–14.5)
SODIUM SERPL-SCNC: 138 MMOL/L — SIGNIFICANT CHANGE UP (ref 135–145)
URATE SERPL-MCNC: 6.1 MG/DL — SIGNIFICANT CHANGE UP (ref 3.4–8.8)
WBC # BLD: 0.9 K/UL — CRITICAL LOW (ref 3.8–10.5)
WBC # FLD AUTO: 0.9 K/UL — CRITICAL LOW (ref 3.8–10.5)

## 2018-11-28 PROCEDURE — 99232 SBSQ HOSP IP/OBS MODERATE 35: CPT | Mod: GC

## 2018-11-28 RX ORDER — FUROSEMIDE 40 MG
40 TABLET ORAL ONCE
Qty: 0 | Refills: 0 | Status: COMPLETED | OUTPATIENT
Start: 2018-11-28 | End: 2018-11-28

## 2018-11-28 RX ADMIN — Medication 40 MILLIGRAM(S): at 05:59

## 2018-11-28 RX ADMIN — Medication 400 MILLIGRAM(S): at 05:59

## 2018-11-28 RX ADMIN — Medication 200 MILLIGRAM(S): at 11:21

## 2018-11-28 RX ADMIN — CYCLOSPORINE 100 MILLIGRAM(S): 100 CAPSULE ORAL at 17:52

## 2018-11-28 RX ADMIN — LOSARTAN POTASSIUM 25 MILLIGRAM(S): 100 TABLET, FILM COATED ORAL at 17:51

## 2018-11-28 RX ADMIN — Medication 650 MILLIGRAM(S): at 09:08

## 2018-11-28 RX ADMIN — Medication 650 MILLIGRAM(S): at 09:48

## 2018-11-28 RX ADMIN — SODIUM CHLORIDE 50 MILLILITER(S): 9 INJECTION INTRAMUSCULAR; INTRAVENOUS; SUBCUTANEOUS at 06:00

## 2018-11-28 RX ADMIN — SODIUM CHLORIDE 30 MILLILITER(S): 9 INJECTION INTRAMUSCULAR; INTRAVENOUS; SUBCUTANEOUS at 11:39

## 2018-11-28 RX ADMIN — LOSARTAN POTASSIUM 25 MILLIGRAM(S): 100 TABLET, FILM COATED ORAL at 05:59

## 2018-11-28 RX ADMIN — CARVEDILOL PHOSPHATE 25 MILLIGRAM(S): 80 CAPSULE, EXTENDED RELEASE ORAL at 17:51

## 2018-11-28 RX ADMIN — CARVEDILOL PHOSPHATE 25 MILLIGRAM(S): 80 CAPSULE, EXTENDED RELEASE ORAL at 05:59

## 2018-11-28 RX ADMIN — Medication 40 MILLIGRAM(S): at 11:33

## 2018-11-28 RX ADMIN — Medication 100 MILLIGRAM(S): at 16:13

## 2018-11-28 RX ADMIN — ATORVASTATIN CALCIUM 10 MILLIGRAM(S): 80 TABLET, FILM COATED ORAL at 21:09

## 2018-11-28 RX ADMIN — Medication 50 MILLIGRAM(S): at 16:13

## 2018-11-28 RX ADMIN — Medication 1 TABLET(S): at 11:21

## 2018-11-28 RX ADMIN — LORATADINE 10 MILLIGRAM(S): 10 TABLET ORAL at 11:21

## 2018-11-28 RX ADMIN — Medication 400 MILLIGRAM(S): at 17:51

## 2018-11-28 RX ADMIN — CYCLOSPORINE 100 MILLIGRAM(S): 100 CAPSULE ORAL at 06:00

## 2018-11-28 RX ADMIN — Medication 650 MILLIGRAM(S): at 16:13

## 2018-11-28 RX ADMIN — FLUCONAZOLE 100 MILLIGRAM(S): 150 TABLET ORAL at 11:21

## 2018-11-28 RX ADMIN — Medication 10 MILLIGRAM(S): at 21:09

## 2018-11-28 NOTE — PROGRESS NOTE ADULT - ATTENDING COMMENTS
84yo M with hx AA in 2013 s/p equine ATG/CSA/Prednisone/Promacta, admitted on 11/26 for relapsed AA -started on rabbit ATG/CSA/Prednisone day 2    -monitor counts, transfuse PRN  -cont prophylaxis w/ Diflucan/Bactrim/Acyclovir  -monitor for fevers, monitor for reactions to ATG  -will check CSA level on day 4 in AM 82yo M with hx AA in 2013 s/p equine ATG/CSA/Prednisone/Promacta, admitted on 11/26 for relapsed AA -started on rabbit ATG/CSA/Prednisone day 3    -monitor counts, transfuse PRN  -cont prophylaxis w/ Diflucan/Bactrim/Acyclovir  -monitor for fevers, monitor for reactions to ATG  -will check CSA level on day 4 in AM  -LE edema O>I -will decrease IVF and diurese. Monitor creatinine

## 2018-11-28 NOTE — PROGRESS NOTE ADULT - SUBJECTIVE AND OBJECTIVE BOX
Diagnosis: Aplastic Anemia    Protocol/Chemo Regimen: Rabbit ATG, CSA, Prednisone    Day: 3    Pt endorsed: no complaints    Review of Systems: Denies N/V/D, CP, SOB, HA.     Pain scale: Denies    Diet: Regular    Allergies    No Known Allergies    Intolerances        ANTIMICROBIALS  acyclovir   Oral Tab/Cap 400 milliGRAM(s) Oral every 12 hours  fluconAZOLE   Tablet 100 milliGRAM(s) Oral daily  trimethoprim  160 mG/sulfamethoxazole 800 mG 1 Tablet(s) Oral <User Schedule>      HEME/ONC MEDICATIONS      STANDING MEDICATIONS  acetaminophen   Tablet .. 650 milliGRAM(s) Oral every 24 hours  antithymocyte globulin rabbit IVPB 364 milliGRAM(s) IV Intermittent every 24 hours  atorvastatin 10 milliGRAM(s) Oral at bedtime  carvedilol 25 milliGRAM(s) Oral every 12 hours  cycloSPORINE  (SandIMMUNE)  Solution 100 milliGRAM(s) Oral every 12 hours  diphenhydrAMINE   Injectable 50 milliGRAM(s) IV Push every 24 hours  docusate sodium 200 milliGRAM(s) Oral daily  EPINEPHrine     1 mG/mL Injectable 0.3 milliGRAM(s) IntraMuscular once  furosemide    Tablet 40 milliGRAM(s) Oral daily  loratadine 10 milliGRAM(s) Oral daily  losartan 25 milliGRAM(s) Oral every 12 hours  melatonin 10 milliGRAM(s) Oral at bedtime  predniSONE   Tablet 100 milliGRAM(s) Oral every 24 hours  sodium chloride 0.9%. 1000 milliLiter(s) IV Continuous <Continuous>      PRN MEDICATIONS  acetaminophen   Tablet .. 650 milliGRAM(s) Oral every 6 hours PRN        Vital Signs Last 24 Hrs  T(C): 36.1 (28 Nov 2018 05:53), Max: 36.8 (27 Nov 2018 13:26)  T(F): 96.9 (28 Nov 2018 05:53), Max: 98.2 (27 Nov 2018 13:26)  HR: 70 (28 Nov 2018 05:53) (66 - 102)  BP: 99/58 (28 Nov 2018 05:53) (94/51 - 137/55)  BP(mean): --  RR: 18 (28 Nov 2018 05:53) (18 - 18)  SpO2: 95% (28 Nov 2018 05:53) (94% - 98%)    PHYSICAL EXAM  General: NAD  HEENT: clear oropharynx  CV: (+) S1/S2 RRR  Lungs: positive air movement b/l ant lungs, clear to auscultation, no wheezes, no rales  Abdomen: soft, non-tender, non-distended  Ext: +1 edema  Skin: no rashes and no petechiae  Neuro: alert and oriented X 3, no focal deficits  Central Line: PIV      RECENT CULTURES:        LABS:                        10.4   0.9   )-----------( 21       ( 28 Nov 2018 06:37 )             29.5         Mean Cell Volume : 101.0 fl  Mean Cell Hemoglobin : 35.5 pg  Mean Cell Hemoglobin Concentration : 35.1 gm/dL  Auto Neutrophil # : x  Auto Lymphocyte # : x  Auto Monocyte # : x  Auto Eosinophil # : x  Auto Basophil # : x  Auto Neutrophil % : x  Auto Lymphocyte % : x  Auto Monocyte % : x  Auto Eosinophil % : x  Auto Basophil % : x      11-28    138  |  104  |  47<H>  ----------------------------<  185<H>  4.2   |  23  |  1.38<H>    Ca    8.6      28 Nov 2018 06:32  Phos  3.7     11-28  Mg     2.1     11-28    TPro  6.4  /  Alb  3.6  /  TBili  0.5  /  DBili  x   /  AST  37  /  ALT  16  /  AlkPhos  62  11-28      Mg 2.1  Phos 3.7      PT/INR - ( 26 Nov 2018 13:11 )   PT: 11.8 sec;   INR: 1.03 ratio         PTT - ( 26 Nov 2018 13:11 )  PTT:28.0 sec      Uric Acid 6.1        RADIOLOGY & ADDITIONAL STUDIES: Diagnosis: Aplastic Anemia    Protocol/Chemo Regimen: Rabbit ATG, CSA, Prednisone    Day: 3    Pt endorsed: no complaints    Review of Systems: Denies N/V/D, CP, SOB, HA.     Pain scale: Denies    Diet: Regular    Allergies    No Known Allergies    Intolerances        ANTIMICROBIALS  acyclovir   Oral Tab/Cap 400 milliGRAM(s) Oral every 12 hours  fluconAZOLE   Tablet 100 milliGRAM(s) Oral daily  trimethoprim  160 mG/sulfamethoxazole 800 mG 1 Tablet(s) Oral <User Schedule>      HEME/ONC MEDICATIONS      STANDING MEDICATIONS  acetaminophen   Tablet .. 650 milliGRAM(s) Oral every 24 hours  antithymocyte globulin rabbit IVPB 364 milliGRAM(s) IV Intermittent every 24 hours  atorvastatin 10 milliGRAM(s) Oral at bedtime  carvedilol 25 milliGRAM(s) Oral every 12 hours  cycloSPORINE  (SandIMMUNE)  Solution 100 milliGRAM(s) Oral every 12 hours  diphenhydrAMINE   Injectable 50 milliGRAM(s) IV Push every 24 hours  docusate sodium 200 milliGRAM(s) Oral daily  EPINEPHrine     1 mG/mL Injectable 0.3 milliGRAM(s) IntraMuscular once  furosemide    Tablet 40 milliGRAM(s) Oral daily  loratadine 10 milliGRAM(s) Oral daily  losartan 25 milliGRAM(s) Oral every 12 hours  melatonin 10 milliGRAM(s) Oral at bedtime  predniSONE   Tablet 100 milliGRAM(s) Oral every 24 hours  sodium chloride 0.9%. 1000 milliLiter(s) IV Continuous <Continuous>      PRN MEDICATIONS  acetaminophen   Tablet .. 650 milliGRAM(s) Oral every 6 hours PRN        Vital Signs Last 24 Hrs  T(C): 36.1 (28 Nov 2018 05:53), Max: 36.8 (27 Nov 2018 13:26)  T(F): 96.9 (28 Nov 2018 05:53), Max: 98.2 (27 Nov 2018 13:26)  HR: 70 (28 Nov 2018 05:53) (66 - 102)  BP: 99/58 (28 Nov 2018 05:53) (94/51 - 137/55)  BP(mean): --  RR: 18 (28 Nov 2018 05:53) (18 - 18)  SpO2: 95% (28 Nov 2018 05:53) (94% - 98%)    PHYSICAL EXAM  General: NAD  HEENT: clear oropharynx  CV: (+) S1/S2 RRR  Lungs: positive air movement b/l ant lungs, clear to auscultation, no wheezes, no rales  Abdomen: soft, non-tender, non-distended  Ext: +1-2 edema ankles.   Skin: no rashes and no petechiae  Neuro: alert and oriented X 3, no focal deficits  Central Line: PIV      RECENT CULTURES:        LABS:                        10.4   0.9   )-----------( 21       ( 28 Nov 2018 06:37 )             29.5         Mean Cell Volume : 101.0 fl  Mean Cell Hemoglobin : 35.5 pg  Mean Cell Hemoglobin Concentration : 35.1 gm/dL  Auto Neutrophil # : x  Auto Lymphocyte # : x  Auto Monocyte # : x  Auto Eosinophil # : x  Auto Basophil # : x  Auto Neutrophil % : x  Auto Lymphocyte % : x  Auto Monocyte % : x  Auto Eosinophil % : x  Auto Basophil % : x      11-28    138  |  104  |  47<H>  ----------------------------<  185<H>  4.2   |  23  |  1.38<H>    Ca    8.6      28 Nov 2018 06:32  Phos  3.7     11-28  Mg     2.1     11-28    TPro  6.4  /  Alb  3.6  /  TBili  0.5  /  DBili  x   /  AST  37  /  ALT  16  /  AlkPhos  62  11-28      Mg 2.1  Phos 3.7      PT/INR - ( 26 Nov 2018 13:11 )   PT: 11.8 sec;   INR: 1.03 ratio         PTT - ( 26 Nov 2018 13:11 )  PTT:28.0 sec      Uric Acid 6.1        RADIOLOGY & ADDITIONAL STUDIES:

## 2018-11-28 NOTE — PROGRESS NOTE ADULT - ASSESSMENT
This is an 84 yo M with PMHx of CAD status post PCI with GREYSON x 4 in 2008, cardiomyopathy (EF 20-25%) with ICD placement in 2014, SDH with evacuation after mechanical fall in 2014 treated at [facility], chronic Afib (no coumadin 2/2 thrombocytopenia) and Aplastic Anemia with PNH subpopulation originally diagnosed in 2013 treated with Horse ATG/CSA/Prednisone now admitted for relapsed disease and treatment with Rabbit ATG/CSA/Prednisone. Promacta was discontinued on 11/13/18. The patients latest PNH screen is normal and last BM bx completed on 1018/18 shows decreased megakaryocytes.             .

## 2018-11-28 NOTE — PROGRESS NOTE ADULT - PROBLEM SELECTOR PLAN 1
Rabbit ATG/CSA/Prednisone  CSA to start at 100mg PO BID for previous renal insufficiency, monitor levels  FU level Thurs 11/29  Monitor CBC/Lytes and transfuse/replete PRN  Strict Is and Os/Daily weights/Mouth Care  Thrombocytopenia-replace plt.   Hypophosphatemia-replace phos Rabbit ATG/CSA/Prednisone  CSA to start at 100mg PO BID for previous renal insufficiency, monitor levels  FU level Thurs 11/29  Monitor CBC/Lytes and transfuse/replete PRN  Strict Is and Os/Daily weights/Mouth Care  Lasix 40mg IV x1 today.   decrease IVF to 30. Patient is volume OL.

## 2018-11-29 DIAGNOSIS — N28.9 DISORDER OF KIDNEY AND URETER, UNSPECIFIED: ICD-10-CM

## 2018-11-29 LAB
ALBUMIN SERPL ELPH-MCNC: 3.7 G/DL — SIGNIFICANT CHANGE UP (ref 3.3–5)
ALP SERPL-CCNC: 74 U/L — SIGNIFICANT CHANGE UP (ref 40–120)
ALT FLD-CCNC: 27 U/L — SIGNIFICANT CHANGE UP (ref 10–45)
ANION GAP SERPL CALC-SCNC: 13 MMOL/L — SIGNIFICANT CHANGE UP (ref 5–17)
APTT BLD: 26.1 SEC — LOW (ref 27.5–36.3)
AST SERPL-CCNC: 34 U/L — SIGNIFICANT CHANGE UP (ref 10–40)
BILIRUB SERPL-MCNC: 0.6 MG/DL — SIGNIFICANT CHANGE UP (ref 0.2–1.2)
BUN SERPL-MCNC: 48 MG/DL — HIGH (ref 7–23)
CALCIUM SERPL-MCNC: 9 MG/DL — SIGNIFICANT CHANGE UP (ref 8.4–10.5)
CHLORIDE SERPL-SCNC: 103 MMOL/L — SIGNIFICANT CHANGE UP (ref 96–108)
CO2 SERPL-SCNC: 23 MMOL/L — SIGNIFICANT CHANGE UP (ref 22–31)
CREAT SERPL-MCNC: 1.44 MG/DL — HIGH (ref 0.5–1.3)
CYCLOSPORINE SER-MCNC: 59 NG/ML — LOW (ref 150–400)
FIBRINOGEN PPP-MCNC: 591 MG/DL — HIGH (ref 350–510)
GLUCOSE SERPL-MCNC: 197 MG/DL — HIGH (ref 70–99)
HCT VFR BLD CALC: 33.2 % — LOW (ref 39–50)
HGB BLD-MCNC: 11.2 G/DL — LOW (ref 13–17)
INR BLD: 1 RATIO — SIGNIFICANT CHANGE UP (ref 0.88–1.16)
LDH SERPL L TO P-CCNC: 179 U/L — SIGNIFICANT CHANGE UP (ref 50–242)
MAGNESIUM SERPL-MCNC: 2.1 MG/DL — SIGNIFICANT CHANGE UP (ref 1.6–2.6)
MCHC RBC-ENTMCNC: 33.8 GM/DL — SIGNIFICANT CHANGE UP (ref 32–36)
MCHC RBC-ENTMCNC: 34.4 PG — HIGH (ref 27–34)
MCV RBC AUTO: 102 FL — HIGH (ref 80–100)
PHOSPHATE SERPL-MCNC: 3.3 MG/DL — SIGNIFICANT CHANGE UP (ref 2.5–4.5)
PLATELET # BLD AUTO: 17 K/UL — CRITICAL LOW (ref 150–400)
POTASSIUM SERPL-MCNC: 4.4 MMOL/L — SIGNIFICANT CHANGE UP (ref 3.5–5.3)
POTASSIUM SERPL-SCNC: 4.4 MMOL/L — SIGNIFICANT CHANGE UP (ref 3.5–5.3)
PROT SERPL-MCNC: 6.7 G/DL — SIGNIFICANT CHANGE UP (ref 6–8.3)
PROTHROM AB SERPL-ACNC: 11.5 SEC — SIGNIFICANT CHANGE UP (ref 10–12.9)
RBC # BLD: 3.26 M/UL — LOW (ref 4.2–5.8)
RBC # FLD: 15.7 % — HIGH (ref 10.3–14.5)
SODIUM SERPL-SCNC: 139 MMOL/L — SIGNIFICANT CHANGE UP (ref 135–145)
URATE SERPL-MCNC: 6.3 MG/DL — SIGNIFICANT CHANGE UP (ref 3.4–8.8)
WBC # BLD: 0.5 K/UL — CRITICAL LOW (ref 3.8–10.5)
WBC # FLD AUTO: 0.5 K/UL — CRITICAL LOW (ref 3.8–10.5)

## 2018-11-29 PROCEDURE — 99232 SBSQ HOSP IP/OBS MODERATE 35: CPT | Mod: GC

## 2018-11-29 RX ORDER — LOSARTAN POTASSIUM 100 MG/1
1 TABLET, FILM COATED ORAL
Qty: 0 | Refills: 0 | COMMUNITY

## 2018-11-29 RX ORDER — DOCUSATE SODIUM 100 MG
2 CAPSULE ORAL
Qty: 0 | Refills: 0 | COMMUNITY

## 2018-11-29 RX ORDER — LORATADINE 10 MG/1
1 TABLET ORAL
Qty: 0 | Refills: 0 | COMMUNITY
Start: 2018-11-29

## 2018-11-29 RX ORDER — POTASSIUM CHLORIDE 20 MEQ
1 PACKET (EA) ORAL
Qty: 0 | Refills: 0 | COMMUNITY

## 2018-11-29 RX ORDER — LANOLIN ALCOHOL/MO/W.PET/CERES
1 CREAM (GRAM) TOPICAL
Qty: 0 | Refills: 0 | COMMUNITY

## 2018-11-29 RX ORDER — FUROSEMIDE 40 MG
1 TABLET ORAL
Qty: 0 | Refills: 0 | COMMUNITY

## 2018-11-29 RX ORDER — FLUCONAZOLE 150 MG/1
1 TABLET ORAL
Qty: 30 | Refills: 0 | OUTPATIENT
Start: 2018-11-29 | End: 2018-12-28

## 2018-11-29 RX ADMIN — Medication 100 MILLIGRAM(S): at 16:02

## 2018-11-29 RX ADMIN — CARVEDILOL PHOSPHATE 25 MILLIGRAM(S): 80 CAPSULE, EXTENDED RELEASE ORAL at 17:35

## 2018-11-29 RX ADMIN — Medication 200 MILLIGRAM(S): at 11:00

## 2018-11-29 RX ADMIN — LOSARTAN POTASSIUM 25 MILLIGRAM(S): 100 TABLET, FILM COATED ORAL at 06:08

## 2018-11-29 RX ADMIN — LOSARTAN POTASSIUM 25 MILLIGRAM(S): 100 TABLET, FILM COATED ORAL at 17:35

## 2018-11-29 RX ADMIN — FLUCONAZOLE 100 MILLIGRAM(S): 150 TABLET ORAL at 11:00

## 2018-11-29 RX ADMIN — Medication 1 DROP(S): at 20:14

## 2018-11-29 RX ADMIN — ATORVASTATIN CALCIUM 10 MILLIGRAM(S): 80 TABLET, FILM COATED ORAL at 21:20

## 2018-11-29 RX ADMIN — Medication 650 MILLIGRAM(S): at 16:03

## 2018-11-29 RX ADMIN — Medication 650 MILLIGRAM(S): at 10:49

## 2018-11-29 RX ADMIN — Medication 10 MILLIGRAM(S): at 21:20

## 2018-11-29 RX ADMIN — Medication 400 MILLIGRAM(S): at 17:35

## 2018-11-29 RX ADMIN — LORATADINE 10 MILLIGRAM(S): 10 TABLET ORAL at 11:00

## 2018-11-29 RX ADMIN — CYCLOSPORINE 100 MILLIGRAM(S): 100 CAPSULE ORAL at 06:09

## 2018-11-29 RX ADMIN — CYCLOSPORINE 100 MILLIGRAM(S): 100 CAPSULE ORAL at 17:35

## 2018-11-29 RX ADMIN — CARVEDILOL PHOSPHATE 25 MILLIGRAM(S): 80 CAPSULE, EXTENDED RELEASE ORAL at 06:09

## 2018-11-29 RX ADMIN — Medication 400 MILLIGRAM(S): at 06:09

## 2018-11-29 RX ADMIN — Medication 50 MILLIGRAM(S): at 16:03

## 2018-11-29 RX ADMIN — Medication 650 MILLIGRAM(S): at 11:29

## 2018-11-29 RX ADMIN — SODIUM CHLORIDE 30 MILLILITER(S): 9 INJECTION INTRAMUSCULAR; INTRAVENOUS; SUBCUTANEOUS at 06:08

## 2018-11-29 RX ADMIN — Medication 40 MILLIGRAM(S): at 06:08

## 2018-11-29 NOTE — PROGRESS NOTE ADULT - PROBLEM SELECTOR PLAN 3
mild renal insufficiency   ? diuretic induced  monitor closely. mild renal insufficiency   ? diuretic induced vs sec Cyclosporine  monitor closely.

## 2018-11-29 NOTE — PROGRESS NOTE ADULT - SUBJECTIVE AND OBJECTIVE BOX
Diagnosis: Aplastic Anemia    Protocol/Chemo Regimen: Rabbit ATG, CSA, Prednisone    Day: 4    Pt endorsed: no acute complaints     Review of Systems: Denies N/V/D, CP, SOB, HA.       Pain scale: Denies    Diet: Regular     Allergies    No Known Allergies    Intolerances        ANTIMICROBIALS  acyclovir   Oral Tab/Cap 400 milliGRAM(s) Oral every 12 hours  fluconAZOLE   Tablet 100 milliGRAM(s) Oral daily  trimethoprim  160 mG/sulfamethoxazole 800 mG 1 Tablet(s) Oral <User Schedule>      HEME/ONC MEDICATIONS      STANDING MEDICATIONS  acetaminophen   Tablet .. 650 milliGRAM(s) Oral every 24 hours  antithymocyte globulin rabbit IVPB 364 milliGRAM(s) IV Intermittent every 24 hours  atorvastatin 10 milliGRAM(s) Oral at bedtime  carvedilol 25 milliGRAM(s) Oral every 12 hours  cycloSPORINE  (SandIMMUNE)  Solution 100 milliGRAM(s) Oral every 12 hours  diphenhydrAMINE   Injectable 50 milliGRAM(s) IV Push every 24 hours  docusate sodium 200 milliGRAM(s) Oral daily  EPINEPHrine     1 mG/mL Injectable 0.3 milliGRAM(s) IntraMuscular once  furosemide    Tablet 40 milliGRAM(s) Oral daily  loratadine 10 milliGRAM(s) Oral daily  losartan 25 milliGRAM(s) Oral every 12 hours  melatonin 10 milliGRAM(s) Oral at bedtime  predniSONE   Tablet 100 milliGRAM(s) Oral every 24 hours  sodium chloride 0.9%. 1000 milliLiter(s) IV Continuous <Continuous>      PRN MEDICATIONS  acetaminophen   Tablet .. 650 milliGRAM(s) Oral every 6 hours PRN        Vital Signs Last 24 Hrs  T(C): 36.1 (29 Nov 2018 05:54), Max: 36.8 (28 Nov 2018 19:15)  T(F): 97 (29 Nov 2018 05:54), Max: 98.2 (28 Nov 2018 19:15)  HR: 87 (29 Nov 2018 05:54) (65 - 108)  BP: 106/68 (29 Nov 2018 05:54) (92/55 - 123/72)  BP(mean): --  RR: 18 (29 Nov 2018 05:54) (18 - 20)  SpO2: 97% (29 Nov 2018 05:54) (95% - 100%)    PHYSICAL EXAM  General: NAD  HEENT: PERRLA, EOMI   Neck: supple  CV: (+) S1/S2 RRR  Lungs: clear to auscultation, no wheezes or rales  Abdomen: soft, non-tender, non-distended (+) BS  Ext: +1 - +2 edema bilateral LE   Skin: no rashes and no petechiae  Neuro: alert and oriented X 3, no focal deficits  Central Line: PIV     RECENT CULTURES:    No recent cultures         LABS:                        11.2   0.5   )-----------( 17       ( 29 Nov 2018 06:51 )             33.2         Mean Cell Volume : 102.0 fl  Mean Cell Hemoglobin : 34.4 pg  Mean Cell Hemoglobin Concentration : 33.8 gm/dL  Auto Neutrophil # : x  Auto Lymphocyte # : x  Auto Monocyte # : x  Auto Eosinophil # : x  Auto Basophil # : x  Auto Neutrophil % : x  Auto Lymphocyte % : x  Auto Monocyte % : x  Auto Eosinophil % : x  Auto Basophil % : x      11-29    139  |  103  |  48<H>  ----------------------------<  197<H>  4.4   |  23  |  1.44<H>    Ca    9.0      29 Nov 2018 06:51  Phos  3.3     11-29  Mg     2.1     11-29    TPro  6.7  /  Alb  3.7  /  TBili  0.6  /  DBili  x   /  AST  34  /  ALT  27  /  AlkPhos  74  11-29      Mg 2.1  Phos 3.3      PT/INR - ( 29 Nov 2018 06:51 )   PT: 11.5 sec;   INR: 1.00 ratio         PTT - ( 29 Nov 2018 06:51 )  PTT:26.1 sec      Uric Acid 6.3        RADIOLOGY & ADDITIONAL STUDIES:  CT Abdomen and Pelvis w/ Oral Cont (08.03.17 @ 12:42) >  Questionable nodular thickening of a small bowel loop in the right   anterior abdomen.  Mesenteric adenopathy.

## 2018-11-29 NOTE — PROGRESS NOTE ADULT - PROBLEM SELECTOR PLAN 1
Rabbit ATG/CSA/Prednisone  CSA  100mg PO BID for previous renal insufficiency, monitor levels  F/U CSA levels daily  Monitor CBC/Lytes and transfuse/replete PRN  Strict Is and Os/Daily weights/Mouth Care Rabbit ATG/CSA/Prednisone  CSA  100mg PO BID for previous renal insufficiency, monitor levels  11/29 Cyclosporine level =59  Monitor CBC/Lytes and transfuse/replete PRN  Strict Is and Os/Daily weights/Mouth Care Rabbit ATG/CSA/Prednisone  CSA  100mg PO BID for previous renal insufficiency, monitor levels  11/29 Cyclosporine level =59  Continue on current dose of 100 mg twice daily.   Monitor CBC/Lytes and transfuse/replete PRN  Strict Is and Os/Daily weights/Mouth Care

## 2018-11-29 NOTE — PROGRESS NOTE ADULT - PROBLEM SELECTOR PLAN 2
The patient is neutropenic  If febrile Pan Cx, CXR and start Cefepime  Continue Acyclovir, Diflucan and Bactrim ppx. The patient is neutropenic  started on Levaquin (renally dosed)  If febrile Pan Cx, CXR and start Cefepime  Continue Acyclovir, Diflucan and Bactrim ppx.

## 2018-11-29 NOTE — PROGRESS NOTE ADULT - ATTENDING COMMENTS
82yo M with hx AA in 2013 s/p equine ATG/CSA/Prednisone/Promacta, admitted on 11/26 for relapsed AA -started on rabbit ATG/CSA/Prednisone day 3    -monitor counts, transfuse PRN  -cont prophylaxis w/ Diflucan/Bactrim/Acyclovir  -monitor for fevers, monitor for reactions to ATG  -will check CSA level on day 4 in AM  -LE edema O>I -will decrease IVF and diurese. Monitor creatinine 84yo M with hx AA in 2013 s/p equine ATG/CSA/Prednisone/Promacta, admitted on 11/26 for relapsed AA -started on rabbit ATG/CSA/Prednisone day 4    -monitor counts, transfuse PRN  -cont prophylaxis w/ Diflucan/Bactrim/Acyclovir. wbc -0.5 today -will start Levaquin prophylaxis  -monitor for fevers, monitor for reactions to ATG  -CSA level 59, cont CSA 100mg twice daily -will not increase CSA due to creatinine inc'ed in this pt,  wanted to continue flat dose CSA  -monitor creatinine, on standing Lasix, monitor weights and I/O's  -d/c home after ATG ends if stable, on 12/1 -d/c on all antimicrobials for prophylaxis

## 2018-11-29 NOTE — PROGRESS NOTE ADULT - ASSESSMENT
This is an 82 yo M with PMHx of CAD status post PCI with GREYSON x 4 in 2008, cardiomyopathy (EF 20-25%) with ICD placement in 2014, SDH with evacuation after mechanical fall in 2014 treated at [facility], chronic Afib (no coumadin 2/2 thrombocytopenia) and Aplastic Anemia with PNH subpopulation originally diagnosed in 2013 treated with Horse ATG/CSA/Prednisone now admitted for relapsed disease and treatment with Rabbit ATG/CSA/Prednisone. Promacta was discontinued on 11/13/18. The patients latest PNH screen is normal and last BM bx completed on 1018/18 shows decreased megakaryocytes.             .

## 2018-11-30 ENCOUNTER — OUTPATIENT (OUTPATIENT)
Dept: OUTPATIENT SERVICES | Facility: HOSPITAL | Age: 83
LOS: 1 days | Discharge: HOME | End: 2018-11-30

## 2018-11-30 DIAGNOSIS — D59.5: ICD-10-CM

## 2018-11-30 DIAGNOSIS — Z95.810 PRESENCE OF AUTOMATIC (IMPLANTABLE) CARDIAC DEFIBRILLATOR: Chronic | ICD-10-CM

## 2018-11-30 DIAGNOSIS — Z98.890 OTHER SPECIFIED POSTPROCEDURAL STATES: Chronic | ICD-10-CM

## 2018-11-30 DIAGNOSIS — D61.9 APLASTIC ANEMIA, UNSPECIFIED: ICD-10-CM

## 2018-11-30 LAB
ALBUMIN SERPL ELPH-MCNC: 3.5 G/DL — SIGNIFICANT CHANGE UP (ref 3.3–5)
ALP SERPL-CCNC: 70 U/L — SIGNIFICANT CHANGE UP (ref 40–120)
ALT FLD-CCNC: 25 U/L — SIGNIFICANT CHANGE UP (ref 10–45)
ANION GAP SERPL CALC-SCNC: 12 MMOL/L — SIGNIFICANT CHANGE UP (ref 5–17)
AST SERPL-CCNC: 21 U/L — SIGNIFICANT CHANGE UP (ref 10–40)
BILIRUB SERPL-MCNC: 0.8 MG/DL — SIGNIFICANT CHANGE UP (ref 0.2–1.2)
BLD GP AB SCN SERPL QL: NEGATIVE — SIGNIFICANT CHANGE UP
BUN SERPL-MCNC: 54 MG/DL — HIGH (ref 7–23)
CALCIUM SERPL-MCNC: 8.9 MG/DL — SIGNIFICANT CHANGE UP (ref 8.4–10.5)
CHLORIDE SERPL-SCNC: 107 MMOL/L — SIGNIFICANT CHANGE UP (ref 96–108)
CO2 SERPL-SCNC: 23 MMOL/L — SIGNIFICANT CHANGE UP (ref 22–31)
CREAT SERPL-MCNC: 1.31 MG/DL — HIGH (ref 0.5–1.3)
GLUCOSE SERPL-MCNC: 193 MG/DL — HIGH (ref 70–99)
HCT VFR BLD CALC: 34.1 % — LOW (ref 39–50)
HGB BLD-MCNC: 11.5 G/DL — LOW (ref 13–17)
LDH SERPL L TO P-CCNC: 158 U/L — SIGNIFICANT CHANGE UP (ref 50–242)
MAGNESIUM SERPL-MCNC: 2 MG/DL — SIGNIFICANT CHANGE UP (ref 1.6–2.6)
MCHC RBC-ENTMCNC: 33.7 GM/DL — SIGNIFICANT CHANGE UP (ref 32–36)
MCHC RBC-ENTMCNC: 34.1 PG — HIGH (ref 27–34)
MCV RBC AUTO: 101 FL — HIGH (ref 80–100)
PHOSPHATE SERPL-MCNC: 3.3 MG/DL — SIGNIFICANT CHANGE UP (ref 2.5–4.5)
PLATELET # BLD AUTO: 11 K/UL — CRITICAL LOW (ref 150–400)
POTASSIUM SERPL-MCNC: 4.3 MMOL/L — SIGNIFICANT CHANGE UP (ref 3.5–5.3)
POTASSIUM SERPL-SCNC: 4.3 MMOL/L — SIGNIFICANT CHANGE UP (ref 3.5–5.3)
PROT SERPL-MCNC: 6.3 G/DL — SIGNIFICANT CHANGE UP (ref 6–8.3)
RBC # BLD: 3.36 M/UL — LOW (ref 4.2–5.8)
RBC # FLD: 15.6 % — HIGH (ref 10.3–14.5)
RH IG SCN BLD-IMP: POSITIVE — SIGNIFICANT CHANGE UP
SODIUM SERPL-SCNC: 141 MMOL/L — SIGNIFICANT CHANGE UP (ref 135–145)
URATE SERPL-MCNC: 6.2 MG/DL — SIGNIFICANT CHANGE UP (ref 3.4–8.8)
WBC # BLD: 0.3 K/UL — CRITICAL LOW (ref 3.8–10.5)
WBC # FLD AUTO: 0.3 K/UL — CRITICAL LOW (ref 3.8–10.5)

## 2018-11-30 PROCEDURE — 99232 SBSQ HOSP IP/OBS MODERATE 35: CPT

## 2018-11-30 RX ADMIN — Medication 200 MILLIGRAM(S): at 12:06

## 2018-11-30 RX ADMIN — LOSARTAN POTASSIUM 25 MILLIGRAM(S): 100 TABLET, FILM COATED ORAL at 05:55

## 2018-11-30 RX ADMIN — SODIUM CHLORIDE 30 MILLILITER(S): 9 INJECTION INTRAMUSCULAR; INTRAVENOUS; SUBCUTANEOUS at 05:56

## 2018-11-30 RX ADMIN — SODIUM CHLORIDE 30 MILLILITER(S): 9 INJECTION INTRAMUSCULAR; INTRAVENOUS; SUBCUTANEOUS at 18:45

## 2018-11-30 RX ADMIN — Medication 10 MILLIGRAM(S): at 21:45

## 2018-11-30 RX ADMIN — FLUCONAZOLE 100 MILLIGRAM(S): 150 TABLET ORAL at 12:06

## 2018-11-30 RX ADMIN — Medication 1 DROP(S): at 13:41

## 2018-11-30 RX ADMIN — ATORVASTATIN CALCIUM 10 MILLIGRAM(S): 80 TABLET, FILM COATED ORAL at 21:45

## 2018-11-30 RX ADMIN — Medication 1 DROP(S): at 05:56

## 2018-11-30 RX ADMIN — Medication 400 MILLIGRAM(S): at 18:52

## 2018-11-30 RX ADMIN — CYCLOSPORINE 100 MILLIGRAM(S): 100 CAPSULE ORAL at 18:52

## 2018-11-30 RX ADMIN — Medication 400 MILLIGRAM(S): at 05:55

## 2018-11-30 RX ADMIN — Medication 650 MILLIGRAM(S): at 15:34

## 2018-11-30 RX ADMIN — LORATADINE 10 MILLIGRAM(S): 10 TABLET ORAL at 12:06

## 2018-11-30 RX ADMIN — LOSARTAN POTASSIUM 25 MILLIGRAM(S): 100 TABLET, FILM COATED ORAL at 18:52

## 2018-11-30 RX ADMIN — CARVEDILOL PHOSPHATE 25 MILLIGRAM(S): 80 CAPSULE, EXTENDED RELEASE ORAL at 05:55

## 2018-11-30 RX ADMIN — Medication 650 MILLIGRAM(S): at 16:34

## 2018-11-30 RX ADMIN — CYCLOSPORINE 100 MILLIGRAM(S): 100 CAPSULE ORAL at 05:55

## 2018-11-30 RX ADMIN — Medication 50 MILLIGRAM(S): at 15:33

## 2018-11-30 RX ADMIN — Medication 1 TABLET(S): at 12:06

## 2018-11-30 RX ADMIN — CARVEDILOL PHOSPHATE 25 MILLIGRAM(S): 80 CAPSULE, EXTENDED RELEASE ORAL at 18:52

## 2018-11-30 RX ADMIN — Medication 40 MILLIGRAM(S): at 05:55

## 2018-11-30 RX ADMIN — Medication 100 MILLIGRAM(S): at 15:32

## 2018-11-30 NOTE — PROGRESS NOTE ADULT - ATTENDING COMMENTS
84yo M with hx AA in 2013 s/p equine ATG/CSA/Prednisone/Promacta, admitted on 11/26 for relapsed AA -started on rabbit ATG/CSA/Prednisone day 4    -monitor counts, transfuse PRN  -cont prophylaxis w/ Diflucan/Bactrim/Acyclovir. wbc -0.5 today -will start Levaquin prophylaxis  -monitor for fevers, monitor for reactions to ATG  -CSA level 59, cont CSA 100mg twice daily -will not increase CSA due to creatinine inc'ed in this pt,  wanted to continue flat dose CSA  -monitor creatinine, on standing Lasix, monitor weights and I/O's  -d/c home after ATG ends if stable, on 12/1 -d/c on all antimicrobials for prophylaxis 84yo M with hx AA in 2013 s/p equine ATG/CSA/Prednisone/Promacta, admitted on 11/26 for relapsed AA -started on rabbit ATG/CSA/Prednisone day 4    -monitor counts, transfuse PRN  -cont prophylaxis w/ Diflucan/Bactrim/Acyclovir. wbc -0.5 today -will start Levaquin prophylaxis  -monitor for fevers, monitor for reactions to ATG  -CSA level 59, cont CSA 100mg twice daily -will not increase CSA due to creatinine inc'ed in this pt,  wanted to continue flat dose CSA  -monitor creatinine, on standing Lasix, monitor weights and I/O's  -d/c home after ATG ends if stable, on 12/1 -d/c on all antimicrobials for prophylaxis.

## 2018-11-30 NOTE — PROGRESS NOTE ADULT - PROBLEM SELECTOR PLAN 2
The patient is neutropenic  started on Levaquin (renally dosed)  If febrile Pan Cx, CXR and start Cefepime  Continue Acyclovir, Diflucan and Bactrim ppx.

## 2018-11-30 NOTE — PROGRESS NOTE ADULT - PROBLEM SELECTOR PLAN 1
Rabbit ATG/CSA/Prednisone  CSA  100mg PO BID for previous renal insufficiency, monitor levels  11/29 Cyclosporine level =59  Continue on current dose of 100 mg twice daily.   Monitor CBC/Lytes and transfuse/replete PRN  Strict Is and Os/Daily weights/Mouth Care Rabbit ATG/CSA/Prednisone  CSA  100mg PO BID for previous renal insufficiency, monitor levels  Continue on current dose of 100 mg twice daily.   Monitor CBC/Lytes and transfuse/replete PRN  Strict Is and Os/Daily weights/Mouth Care

## 2018-11-30 NOTE — PROGRESS NOTE ADULT - PROBLEM SELECTOR PLAN 3
mild renal insufficiency   ? diuretic induced vs sec Cyclosporine  monitor closely. mild renal insufficiency   diuretic induced vs sec Cyclosporine  monitor closely.

## 2018-11-30 NOTE — PROGRESS NOTE ADULT - ASSESSMENT
This is an 82 yo M with PMHx of CAD status post PCI with GREYSON x 4 in 2008, cardiomyopathy (EF 20-25%) with ICD placement in 2014, SDH with evacuation after mechanical fall in 2014 treated at [facility], chronic Afib (no coumadin 2/2 thrombocytopenia) and Aplastic Anemia with PNH subpopulation originally diagnosed in 2013 treated with Horse ATG/CSA/Prednisone now admitted for relapsed disease and treatment with Rabbit ATG/CSA/Prednisone. Promacta was discontinued on 11/13/18. The patients latest PNH screen is normal and last BM bx completed on 1018/18 shows decreased megakaryocytes.             .

## 2018-11-30 NOTE — PROGRESS NOTE ADULT - SUBJECTIVE AND OBJECTIVE BOX
Diagnosis:    Protocol/Chemo Regimen:    Day:     Pt endorsed:    Review of Systems:     Pain scale:     Diet:     Allergies    No Known Allergies    Intolerances        ANTIMICROBIALS  acyclovir   Oral Tab/Cap 400 milliGRAM(s) Oral every 12 hours  fluconAZOLE   Tablet 100 milliGRAM(s) Oral daily  levoFLOXacin  Tablet 250 milliGRAM(s) Oral every 24 hours  trimethoprim  160 mG/sulfamethoxazole 800 mG 1 Tablet(s) Oral <User Schedule>      HEME/ONC MEDICATIONS      STANDING MEDICATIONS  acetaminophen   Tablet .. 650 milliGRAM(s) Oral every 24 hours  antithymocyte globulin rabbit IVPB 364 milliGRAM(s) IV Intermittent every 24 hours  artificial  tears Solution 1 Drop(s) Both EYES every 6 hours  atorvastatin 10 milliGRAM(s) Oral at bedtime  carvedilol 25 milliGRAM(s) Oral every 12 hours  cycloSPORINE  (SandIMMUNE)  Solution 100 milliGRAM(s) Oral every 12 hours  diphenhydrAMINE   Injectable 50 milliGRAM(s) IV Push every 24 hours  docusate sodium 200 milliGRAM(s) Oral daily  EPINEPHrine     1 mG/mL Injectable 0.3 milliGRAM(s) IntraMuscular once  furosemide    Tablet 40 milliGRAM(s) Oral daily  loratadine 10 milliGRAM(s) Oral daily  losartan 25 milliGRAM(s) Oral every 12 hours  melatonin 10 milliGRAM(s) Oral at bedtime  predniSONE   Tablet 100 milliGRAM(s) Oral every 24 hours  sodium chloride 0.9%. 1000 milliLiter(s) IV Continuous <Continuous>      PRN MEDICATIONS  acetaminophen   Tablet .. 650 milliGRAM(s) Oral every 6 hours PRN        Vital Signs Last 24 Hrs  T(C): 36.1 (30 Nov 2018 05:42), Max: 36.5 (29 Nov 2018 19:15)  T(F): 97 (30 Nov 2018 05:42), Max: 97.7 (29 Nov 2018 19:15)  HR: 58 (30 Nov 2018 05:42) (58 - 92)  BP: 106/65 (30 Nov 2018 05:42) (106/65 - 130/77)  BP(mean): --  RR: 18 (30 Nov 2018 05:42) (17 - 19)  SpO2: 95% (30 Nov 2018 05:42) (95% - 99%)    PHYSICAL EXAM  General: NAD  HEENT: PERRLA, EOMOI, clear oropharynx, anicteric sclera, pink conjunctiva  Neck: supple  CV: (+) S1/S2 RRR  Lungs: clear to auscultation, no wheezes or rales  Abdomen: soft, non-tender, non-distended (+) BS  Ext: no clubbing, cyanosis or edema  Skin: no rashes and no petechiae  Neuro: alert and oriented X 3, no focal deficits  Central Line:     RECENT CULTURES:        LABS:                        11.5   0.3   )-----------( 11       ( 30 Nov 2018 07:17 )             34.1         Mean Cell Volume : 101.0 fl  Mean Cell Hemoglobin : 34.1 pg  Mean Cell Hemoglobin Concentration : 33.7 gm/dL  Auto Neutrophil # : x  Auto Lymphocyte # : x  Auto Monocyte # : x  Auto Eosinophil # : x  Auto Basophil # : x  Auto Neutrophil % : x  Auto Lymphocyte % : x  Auto Monocyte % : x  Auto Eosinophil % : x  Auto Basophil % : x      11-30    141  |  107  |  54<H>  ----------------------------<  193<H>  4.3   |  23  |  1.31<H>    Ca    8.9      30 Nov 2018 07:12  Phos  3.3     11-30  Mg     2.0     11-30    TPro  6.3  /  Alb  3.5  /  TBili  0.8  /  DBili  x   /  AST  21  /  ALT  25  /  AlkPhos  70  11-30      Mg 2.0  Phos 3.3      PT/INR - ( 29 Nov 2018 06:51 )   PT: 11.5 sec;   INR: 1.00 ratio         PTT - ( 29 Nov 2018 06:51 )  PTT:26.1 sec      Uric Acid 6.2        RADIOLOGY & ADDITIONAL STUDIES: Diagnosis: Aplastic Anemia    Protocol/Chemo Regimen: Rabbit ATG, CSA, Prednisone    Day: 5    Pt endorsed: no acute complaints     Review of Systems: Denies N/V/D, CP, SOB, HA.     Pain scale: Denies    Diet: Regular     Allergies: No Known Allergies    ANTIMICROBIALS  acyclovir   Oral Tab/Cap 400 milliGRAM(s) Oral every 12 hours  fluconAZOLE   Tablet 100 milliGRAM(s) Oral daily  levoFLOXacin  Tablet 250 milliGRAM(s) Oral every 24 hours  trimethoprim  160 mG/sulfamethoxazole 800 mG 1 Tablet(s) Oral <User Schedule>    STANDING MEDICATIONS  acetaminophen   Tablet .. 650 milliGRAM(s) Oral every 24 hours  antithymocyte globulin rabbit IVPB 364 milliGRAM(s) IV Intermittent every 24 hours  artificial  tears Solution 1 Drop(s) Both EYES every 6 hours  atorvastatin 10 milliGRAM(s) Oral at bedtime  carvedilol 25 milliGRAM(s) Oral every 12 hours  cycloSPORINE  (SandIMMUNE)  Solution 100 milliGRAM(s) Oral every 12 hours  diphenhydrAMINE   Injectable 50 milliGRAM(s) IV Push every 24 hours  docusate sodium 200 milliGRAM(s) Oral daily  EPINEPHrine     1 mG/mL Injectable 0.3 milliGRAM(s) IntraMuscular once  furosemide    Tablet 40 milliGRAM(s) Oral daily  loratadine 10 milliGRAM(s) Oral daily  losartan 25 milliGRAM(s) Oral every 12 hours  melatonin 10 milliGRAM(s) Oral at bedtime  predniSONE   Tablet 100 milliGRAM(s) Oral every 24 hours  sodium chloride 0.9%. 1000 milliLiter(s) IV Continuous <Continuous>      PRN MEDICATIONS  acetaminophen   Tablet .. 650 milliGRAM(s) Oral every 6 hours PRN        Vital Signs Last 24 Hrs  T(C): 36.1 (30 Nov 2018 05:42), Max: 36.5 (29 Nov 2018 19:15)  T(F): 97 (30 Nov 2018 05:42), Max: 97.7 (29 Nov 2018 19:15)  HR: 58 (30 Nov 2018 05:42) (58 - 92)  BP: 106/65 (30 Nov 2018 05:42) (106/65 - 130/77)  BP(mean): --  RR: 18 (30 Nov 2018 05:42) (17 - 19)  SpO2: 95% (30 Nov 2018 05:42) (95% - 99%)    PHYSICAL EXAM  General: NAD  HEENT: PERRLA, EOMI   Neck: supple  CV: (+) S1/S2 RRR  Lungs: clear to auscultation, no wheezes or rales  Abdomen: soft, non-tender, non-distended (+) BS  Ext: +1 - +2 edema bilateral LE   Skin: no rashes and no petechiae  Neuro: alert and oriented X 3, no focal deficits  Central Line: PIV     RECENT CULTURES:        LABS:                        11.5   0.3   )-----------( 11       ( 30 Nov 2018 07:17 )             34.1         Mean Cell Volume : 101.0 fl  Mean Cell Hemoglobin : 34.1 pg  Mean Cell Hemoglobin Concentration : 33.7 gm/dL  Auto Neutrophil # : x  Auto Lymphocyte # : x  Auto Monocyte # : x  Auto Eosinophil # : x  Auto Basophil # : x  Auto Neutrophil % : x  Auto Lymphocyte % : x  Auto Monocyte % : x  Auto Eosinophil % : x  Auto Basophil % : x      11-30    141  |  107  |  54<H>  ----------------------------<  193<H>  4.3   |  23  |  1.31<H>    Ca    8.9      30 Nov 2018 07:12  Phos  3.3     11-30  Mg     2.0     11-30    TPro  6.3  /  Alb  3.5  /  TBili  0.8  /  DBili  x   /  AST  21  /  ALT  25  /  AlkPhos  70  11-30      Mg 2.0  Phos 3.3      PT/INR - ( 29 Nov 2018 06:51 )   PT: 11.5 sec;   INR: 1.00 ratio         PTT - ( 29 Nov 2018 06:51 )  PTT:26.1 sec      Uric Acid 6.2    RADIOLOGY & ADDITIONAL STUDIES: Diagnosis: Aplastic Anemia    Protocol/Chemo Regimen: Rabbit ATG, CSA, Prednisone    Day: 5    Pt endorsed: no acute complaints     Review of Systems: Denies N/V/D, CP, SOB, HA.     Pain scale: Denies    Diet: Regular     Allergies: No Known Allergies    ANTIMICROBIALS  acyclovir   Oral Tab/Cap 400 milliGRAM(s) Oral every 12 hours  fluconAZOLE   Tablet 100 milliGRAM(s) Oral daily  levoFLOXacin  Tablet 250 milliGRAM(s) Oral every 24 hours  trimethoprim  160 mG/sulfamethoxazole 800 mG 1 Tablet(s) Oral <User Schedule>    STANDING MEDICATIONS  acetaminophen   Tablet .. 650 milliGRAM(s) Oral every 24 hours  antithymocyte globulin rabbit IVPB 364 milliGRAM(s) IV Intermittent every 24 hours  artificial  tears Solution 1 Drop(s) Both EYES every 6 hours  atorvastatin 10 milliGRAM(s) Oral at bedtime  carvedilol 25 milliGRAM(s) Oral every 12 hours  cycloSPORINE  (SandIMMUNE)  Solution 100 milliGRAM(s) Oral every 12 hours  diphenhydrAMINE   Injectable 50 milliGRAM(s) IV Push every 24 hours  docusate sodium 200 milliGRAM(s) Oral daily  EPINEPHrine     1 mG/mL Injectable 0.3 milliGRAM(s) IntraMuscular once  furosemide    Tablet 40 milliGRAM(s) Oral daily  loratadine 10 milliGRAM(s) Oral daily  losartan 25 milliGRAM(s) Oral every 12 hours  melatonin 10 milliGRAM(s) Oral at bedtime  predniSONE   Tablet 100 milliGRAM(s) Oral every 24 hours  sodium chloride 0.9%. 1000 milliLiter(s) IV Continuous <Continuous>      PRN MEDICATIONS  acetaminophen   Tablet .. 650 milliGRAM(s) Oral every 6 hours PRN    Vital Signs Last 24 Hrs  T(C): 36.1 (30 Nov 2018 05:42), Max: 36.5 (29 Nov 2018 19:15)  T(F): 97 (30 Nov 2018 05:42), Max: 97.7 (29 Nov 2018 19:15)  HR: 58 (30 Nov 2018 05:42) (58 - 92)  BP: 106/65 (30 Nov 2018 05:42) (106/65 - 130/77)  BP(mean): --  RR: 18 (30 Nov 2018 05:42) (17 - 19)  SpO2: 95% (30 Nov 2018 05:42) (95% - 99%)    PHYSICAL EXAM  General: NAD  HEENT: PERRLA, EOMI   Neck: supple  CV: (+) S1/S2 RRR  Lungs: clear to auscultation, no wheezes or rales  Abdomen: soft, non-tender, non-distended (+) BS  Ext: +1 - +2 edema bilateral LE   Skin: no rashes and no petechiae  Neuro: alert and oriented X 3, no focal deficits  Central Line: PIV     LABS:                        11.5   0.3   )-----------( 11       ( 30 Nov 2018 07:17 )             34.1         Mean Cell Volume : 101.0 fl  Mean Cell Hemoglobin : 34.1 pg  Mean Cell Hemoglobin Concentration : 33.7 gm/dL  Auto Neutrophil # : x  Auto Lymphocyte # : x  Auto Monocyte # : x  Auto Eosinophil # : x  Auto Basophil # : x  Auto Neutrophil % : x  Auto Lymphocyte % : x  Auto Monocyte % : x  Auto Eosinophil % : x  Auto Basophil % : x      11-30    141  |  107  |  54<H>  ----------------------------<  193<H>  4.3   |  23  |  1.31<H>    Ca    8.9      30 Nov 2018 07:12  Phos  3.3     11-30  Mg     2.0     11-30    TPro  6.3  /  Alb  3.5  /  TBili  0.8  /  DBili  x   /  AST  21  /  ALT  25  /  AlkPhos  70  11-30      Mg 2.0  Phos 3.3      PT/INR - ( 29 Nov 2018 06:51 )   PT: 11.5 sec;   INR: 1.00 ratio         PTT - ( 29 Nov 2018 06:51 )  PTT:26.1 sec      Uric Acid 6.2    RADIOLOGY & ADDITIONAL STUDIES:

## 2018-12-01 DIAGNOSIS — R74.0 NONSPECIFIC ELEVATION OF LEVELS OF TRANSAMINASE AND LACTIC ACID DEHYDROGENASE [LDH]: ICD-10-CM

## 2018-12-01 LAB
ALBUMIN SERPL ELPH-MCNC: 3.6 G/DL — SIGNIFICANT CHANGE UP (ref 3.3–5)
ALP SERPL-CCNC: 79 U/L — SIGNIFICANT CHANGE UP (ref 40–120)
ALT FLD-CCNC: 54 U/L — HIGH (ref 10–45)
ANION GAP SERPL CALC-SCNC: 12 MMOL/L — SIGNIFICANT CHANGE UP (ref 5–17)
AST SERPL-CCNC: 31 U/L — SIGNIFICANT CHANGE UP (ref 10–40)
BILIRUB SERPL-MCNC: 1.2 MG/DL — SIGNIFICANT CHANGE UP (ref 0.2–1.2)
BUN SERPL-MCNC: 54 MG/DL — HIGH (ref 7–23)
CALCIUM SERPL-MCNC: 9.2 MG/DL — SIGNIFICANT CHANGE UP (ref 8.4–10.5)
CHLORIDE SERPL-SCNC: 105 MMOL/L — SIGNIFICANT CHANGE UP (ref 96–108)
CO2 SERPL-SCNC: 26 MMOL/L — SIGNIFICANT CHANGE UP (ref 22–31)
CREAT SERPL-MCNC: 1.5 MG/DL — HIGH (ref 0.5–1.3)
GLUCOSE SERPL-MCNC: 220 MG/DL — HIGH (ref 70–99)
HCT VFR BLD CALC: 34.6 % — LOW (ref 39–50)
HGB BLD-MCNC: 12.1 G/DL — LOW (ref 13–17)
LDH SERPL L TO P-CCNC: 173 U/L — SIGNIFICANT CHANGE UP (ref 50–242)
MAGNESIUM SERPL-MCNC: 1.9 MG/DL — SIGNIFICANT CHANGE UP (ref 1.6–2.6)
MCHC RBC-ENTMCNC: 35 GM/DL — SIGNIFICANT CHANGE UP (ref 32–36)
MCHC RBC-ENTMCNC: 35.3 PG — HIGH (ref 27–34)
MCV RBC AUTO: 101 FL — HIGH (ref 80–100)
PHOSPHATE SERPL-MCNC: 3.5 MG/DL — SIGNIFICANT CHANGE UP (ref 2.5–4.5)
PLATELET # BLD AUTO: 27 K/UL — LOW (ref 150–400)
POTASSIUM SERPL-MCNC: 4.3 MMOL/L — SIGNIFICANT CHANGE UP (ref 3.5–5.3)
POTASSIUM SERPL-SCNC: 4.3 MMOL/L — SIGNIFICANT CHANGE UP (ref 3.5–5.3)
PROT SERPL-MCNC: 6.5 G/DL — SIGNIFICANT CHANGE UP (ref 6–8.3)
RBC # BLD: 3.43 M/UL — LOW (ref 4.2–5.8)
RBC # FLD: 15.3 % — HIGH (ref 10.3–14.5)
SODIUM SERPL-SCNC: 143 MMOL/L — SIGNIFICANT CHANGE UP (ref 135–145)
URATE SERPL-MCNC: 6 MG/DL — SIGNIFICANT CHANGE UP (ref 3.4–8.8)
WBC # BLD: 0.2 K/UL — CRITICAL LOW (ref 3.8–10.5)
WBC # FLD AUTO: 0.2 K/UL — CRITICAL LOW (ref 3.8–10.5)

## 2018-12-01 PROCEDURE — 99233 SBSQ HOSP IP/OBS HIGH 50: CPT

## 2018-12-01 RX ORDER — ALPRAZOLAM 0.25 MG
0.25 TABLET ORAL ONCE
Qty: 0 | Refills: 0 | Status: DISCONTINUED | OUTPATIENT
Start: 2018-12-01 | End: 2018-12-01

## 2018-12-01 RX ADMIN — CARVEDILOL PHOSPHATE 25 MILLIGRAM(S): 80 CAPSULE, EXTENDED RELEASE ORAL at 18:44

## 2018-12-01 RX ADMIN — LOSARTAN POTASSIUM 25 MILLIGRAM(S): 100 TABLET, FILM COATED ORAL at 17:20

## 2018-12-01 RX ADMIN — Medication 400 MILLIGRAM(S): at 05:29

## 2018-12-01 RX ADMIN — Medication 10 MILLIGRAM(S): at 22:52

## 2018-12-01 RX ADMIN — LORATADINE 10 MILLIGRAM(S): 10 TABLET ORAL at 12:00

## 2018-12-01 RX ADMIN — Medication 400 MILLIGRAM(S): at 17:20

## 2018-12-01 RX ADMIN — LOSARTAN POTASSIUM 25 MILLIGRAM(S): 100 TABLET, FILM COATED ORAL at 05:29

## 2018-12-01 RX ADMIN — ATORVASTATIN CALCIUM 10 MILLIGRAM(S): 80 TABLET, FILM COATED ORAL at 22:53

## 2018-12-01 RX ADMIN — Medication 1 DROP(S): at 12:00

## 2018-12-01 RX ADMIN — FLUCONAZOLE 100 MILLIGRAM(S): 150 TABLET ORAL at 12:00

## 2018-12-01 RX ADMIN — Medication 40 MILLIGRAM(S): at 05:34

## 2018-12-01 RX ADMIN — SODIUM CHLORIDE 30 MILLILITER(S): 9 INJECTION INTRAMUSCULAR; INTRAVENOUS; SUBCUTANEOUS at 05:29

## 2018-12-01 RX ADMIN — Medication 0.25 MILLIGRAM(S): at 13:26

## 2018-12-01 RX ADMIN — CYCLOSPORINE 100 MILLIGRAM(S): 100 CAPSULE ORAL at 17:20

## 2018-12-01 RX ADMIN — Medication 200 MILLIGRAM(S): at 11:59

## 2018-12-01 RX ADMIN — Medication 1 DROP(S): at 05:31

## 2018-12-01 RX ADMIN — Medication 100 MILLIGRAM(S): at 17:19

## 2018-12-01 RX ADMIN — CYCLOSPORINE 100 MILLIGRAM(S): 100 CAPSULE ORAL at 05:28

## 2018-12-01 RX ADMIN — SODIUM CHLORIDE 30 MILLILITER(S): 9 INJECTION INTRAMUSCULAR; INTRAVENOUS; SUBCUTANEOUS at 12:00

## 2018-12-01 RX ADMIN — CARVEDILOL PHOSPHATE 25 MILLIGRAM(S): 80 CAPSULE, EXTENDED RELEASE ORAL at 05:29

## 2018-12-01 NOTE — PROGRESS NOTE ADULT - ASSESSMENT
This is an 84 yo M with PMHx of CAD status post PCI with GREYSON x 4 in 2008, cardiomyopathy (EF 20-25%) with ICD placement in 2014, SDH with evacuation after mechanical fall in 2014 treated at [facility], chronic Afib (no coumadin 2/2 thrombocytopenia) and Aplastic Anemia with PNH subpopulation originally diagnosed in 2013 treated with Horse ATG/CSA/Prednisone now admitted for relapsed disease and treatment with Rabbit ATG/CSA/Prednisone. Promacta was discontinued on 11/13/18. The patients latest PNH screen is normal and last BM bx completed on 1018/18 shows decreased megakaryocytes.             . This is an 82 yo M with PMHx of CAD status post PCI with GREYSON x 4 in 2008, cardiomyopathy (EF 20-25%) with ICD placement in 2014, SDH with evacuation after mechanical fall in 2014 treated at [facility], chronic Afib (no coumadin 2/2 thrombocytopenia) and Aplastic Anemia with PNH subpopulation originally diagnosed in 2013 treated with Horse ATG/CSA/Prednisone now admitted for relapsed disease and treatment with Rabbit ATG/CSA/Prednisone. Promacta was discontinued on 11/13/18. The patients latest PNH screen is normal and last BM bx completed on 1018/18 shows decreased megakaryocytes. Patient has pancytopenia secondary to ATG and disease condition.            .

## 2018-12-01 NOTE — PROGRESS NOTE ADULT - ATTENDING COMMENTS
82yo M with hx AA in 2013 s/p equine ATG/CSA/Prednisone/Promacta, admitted on 11/26 for relapsed AA -started on rabbit ATG/CSA/Prednisone day 4    -monitor counts, transfuse PRN  -cont prophylaxis w/ Diflucan/Bactrim/Acyclovir. wbc -0.5 today -will start Levaquin prophylaxis  -monitor for fevers, monitor for reactions to ATG  -CSA level 59, cont CSA 100mg twice daily -will not increase CSA due to creatinine inc'ed in this pt,  wanted to continue flat dose CSA  -monitor creatinine, on standing Lasix, monitor weights and I/O's  -d/c home after ATG ends if stable, on 12/1 -d/c on all antimicrobials for prophylaxis.

## 2018-12-01 NOTE — PROGRESS NOTE ADULT - SUBJECTIVE AND OBJECTIVE BOX
Diagnosis:    Protocol/Chemo Regimen:    Day:     Pt endorsed:    Review of Systems:     Pain scale:     Diet:     Allergies    No Known Allergies    Intolerances        ANTIMICROBIALS  acyclovir   Oral Tab/Cap 400 milliGRAM(s) Oral every 12 hours  fluconAZOLE   Tablet 100 milliGRAM(s) Oral daily  levoFLOXacin  Tablet 250 milliGRAM(s) Oral every 24 hours  trimethoprim  160 mG/sulfamethoxazole 800 mG 1 Tablet(s) Oral <User Schedule>      HEME/ONC MEDICATIONS      STANDING MEDICATIONS  artificial  tears Solution 1 Drop(s) Both EYES every 6 hours  atorvastatin 10 milliGRAM(s) Oral at bedtime  carvedilol 25 milliGRAM(s) Oral every 12 hours  cycloSPORINE  (SandIMMUNE)  Solution 100 milliGRAM(s) Oral every 12 hours  docusate sodium 200 milliGRAM(s) Oral daily  EPINEPHrine     1 mG/mL Injectable 0.3 milliGRAM(s) IntraMuscular once  furosemide    Tablet 40 milliGRAM(s) Oral daily  loratadine 10 milliGRAM(s) Oral daily  losartan 25 milliGRAM(s) Oral every 12 hours  melatonin 10 milliGRAM(s) Oral at bedtime  predniSONE   Tablet 100 milliGRAM(s) Oral every 24 hours  sodium chloride 0.9%. 1000 milliLiter(s) IV Continuous <Continuous>      PRN MEDICATIONS  acetaminophen   Tablet .. 650 milliGRAM(s) Oral every 6 hours PRN        Vital Signs Last 24 Hrs  T(C): 36 (01 Dec 2018 09:38), Max: 36.6 (30 Nov 2018 17:00)  T(F): 96.8 (01 Dec 2018 09:38), Max: 97.9 (30 Nov 2018 17:00)  HR: 70 (01 Dec 2018 09:38) (65 - 110)  BP: 130/72 (01 Dec 2018 09:38) (102/55 - 130/72)  BP(mean): --  RR: 18 (01 Dec 2018 09:38) (18 - 18)  SpO2: 99% (01 Dec 2018 09:38) (95% - 99%)    PHYSICAL EXAM  General: NAD  HEENT: PERRLA, EOMOI, clear oropharynx, anicteric sclera, pink conjunctiva  Neck: supple  CV: (+) S1/S2 RRR  Lungs: clear to auscultation, no wheezes or rales  Abdomen: soft, non-tender, non-distended (+) BS  Ext: no clubbing, cyanosis or edema  Skin: no rashes and no petechiae  Neuro: alert and oriented X 3, no focal deficits  Central Line:     RECENT CULTURES:        LABS:                        11.5   0.3   )-----------( 11 ( 30 Nov 2018 07:17 )             34.1         Mean Cell Volume : 101.0 fl  Mean Cell Hemoglobin : 34.1 pg  Mean Cell Hemoglobin Concentration : 33.7 gm/dL  Auto Neutrophil # : x  Auto Lymphocyte # : x  Auto Monocyte # : x  Auto Eosinophil # : x  Auto Basophil # : x  Auto Neutrophil % : x  Auto Lymphocyte % : x  Auto Monocyte % : x  Auto Eosinophil % : x  Auto Basophil % : x      11-30    141  |  107  |  54<H>  ----------------------------<  193<H>  4.3   |  23  |  1.31<H>    Ca    8.9      30 Nov 2018 07:12  Phos  3.3     11-30  Mg     2.0     11-30    TPro  6.3  /  Alb  3.5  /  TBili  0.8  /  DBili  x   /  AST  21  /  ALT  25  /  AlkPhos  70  11-30                  RADIOLOGY & ADDITIONAL STUDIES: Diagnosis: Aplastic Anemia    Protocol/Chemo Regimen: Rabbit ATG, CSA, Prednisone    Day: 6    Pt endorsed: no acute complaints, feeling well    Review of Systems: Denies N/V/D, CP, SOB, HA.     Pain scale: Denies    Diet: Regular     Allergies: No Known Allergies    ANTIMICROBIALS  acyclovir   Oral Tab/Cap 400 milliGRAM(s) Oral every 12 hours  fluconAZOLE   Tablet 100 milliGRAM(s) Oral daily  levoFLOXacin  Tablet 250 milliGRAM(s) Oral every 24 hours  trimethoprim  160 mG/sulfamethoxazole 800 mG 1 Tablet(s) Oral <User Schedule>    STANDING MEDICATIONS  artificial  tears Solution 1 Drop(s) Both EYES every 6 hours  atorvastatin 10 milliGRAM(s) Oral at bedtime  carvedilol 25 milliGRAM(s) Oral every 12 hours  cycloSPORINE  (SandIMMUNE)  Solution 100 milliGRAM(s) Oral every 12 hours  docusate sodium 200 milliGRAM(s) Oral daily  EPINEPHrine     1 mG/mL Injectable 0.3 milliGRAM(s) IntraMuscular once  furosemide    Tablet 40 milliGRAM(s) Oral daily  loratadine 10 milliGRAM(s) Oral daily  losartan 25 milliGRAM(s) Oral every 12 hours  melatonin 10 milliGRAM(s) Oral at bedtime  predniSONE   Tablet 100 milliGRAM(s) Oral every 24 hours  sodium chloride 0.9%. 1000 milliLiter(s) IV Continuous <Continuous>    PRN MEDICATIONS  acetaminophen   Tablet .. 650 milliGRAM(s) Oral every 6 hours PRN    Vital Signs Last 24 Hrs  T(C): 36 (01 Dec 2018 09:38), Max: 36.6 (30 Nov 2018 17:00)  T(F): 96.8 (01 Dec 2018 09:38), Max: 97.9 (30 Nov 2018 17:00)  HR: 70 (01 Dec 2018 09:38) (65 - 110)  BP: 130/72 (01 Dec 2018 09:38) (102/55 - 130/72)  BP(mean): --  RR: 18 (01 Dec 2018 09:38) (18 - 18)  SpO2: 99% (01 Dec 2018 09:38) (95% - 99%)    PHYSICAL EXAM  General: NAD  HEENT: PERRLA, EOMOI, clear oropharynx, anicteric sclera, pink conjunctiva  Neck: supple  CV: (+) S1/S2 RRR  Lungs: clear to auscultation, no wheezes or rales  Abdomen: soft, non-tender, non-distended (+) BS  Ext: no clubbing, cyanosis or edema  Skin: no rashes and no petechiae  Neuro: alert and oriented X 3, no focal deficits  Central Line:     LABS:                        11.5   0.3   )-----------( 11 ( 30 Nov 2018 07:17 )             34.1         Mean Cell Volume : 101.0 fl  Mean Cell Hemoglobin : 34.1 pg  Mean Cell Hemoglobin Concentration : 33.7 gm/dL  Auto Neutrophil # : x  Auto Lymphocyte # : x  Auto Monocyte # : x  Auto Eosinophil # : x  Auto Basophil # : x  Auto Neutrophil % : x  Auto Lymphocyte % : x  Auto Monocyte % : x  Auto Eosinophil % : x  Auto Basophil % : x      11-30    141  |  107  |  54<H>  ----------------------------<  193<H>  4.3   |  23  |  1.31<H>    Ca    8.9      30 Nov 2018 07:12  Phos  3.3     11-30  Mg     2.0     11-30    TPro  6.3  /  Alb  3.5  /  TBili  0.8  /  DBili  x   /  AST  21  /  ALT  25  /  AlkPhos  70  11-30      RADIOLOGY & ADDITIONAL STUDIES: Diagnosis: Aplastic Anemia    Protocol/Chemo Regimen: Rabbit ATG, CSA, Prednisone    Day: 6    Pt endorsed: no acute complaints, feeling well    Review of Systems: Denies N/V/D, CP, SOB, HA.     Pain scale: Denies    Diet: Regular     Allergies: No Known Allergies    ANTIMICROBIALS  acyclovir   Oral Tab/Cap 400 milliGRAM(s) Oral every 12 hours  fluconAZOLE   Tablet 100 milliGRAM(s) Oral daily  levoFLOXacin  Tablet 250 milliGRAM(s) Oral every 24 hours  trimethoprim  160 mG/sulfamethoxazole 800 mG 1 Tablet(s) Oral <User Schedule>    STANDING MEDICATIONS  artificial  tears Solution 1 Drop(s) Both EYES every 6 hours  atorvastatin 10 milliGRAM(s) Oral at bedtime  carvedilol 25 milliGRAM(s) Oral every 12 hours  cycloSPORINE  (SandIMMUNE)  Solution 100 milliGRAM(s) Oral every 12 hours  docusate sodium 200 milliGRAM(s) Oral daily  EPINEPHrine     1 mG/mL Injectable 0.3 milliGRAM(s) IntraMuscular once  furosemide    Tablet 40 milliGRAM(s) Oral daily  loratadine 10 milliGRAM(s) Oral daily  losartan 25 milliGRAM(s) Oral every 12 hours  melatonin 10 milliGRAM(s) Oral at bedtime  predniSONE   Tablet 100 milliGRAM(s) Oral every 24 hours  sodium chloride 0.9%. 1000 milliLiter(s) IV Continuous <Continuous>    PRN MEDICATIONS  acetaminophen   Tablet .. 650 milliGRAM(s) Oral every 6 hours PRN    Vital Signs Last 24 Hrs  T(C): 36 (01 Dec 2018 09:38), Max: 36.6 (30 Nov 2018 17:00)  T(F): 96.8 (01 Dec 2018 09:38), Max: 97.9 (30 Nov 2018 17:00)  HR: 70 (01 Dec 2018 09:38) (65 - 110)  BP: 130/72 (01 Dec 2018 09:38) (102/55 - 130/72)  BP(mean): --  RR: 18 (01 Dec 2018 09:38) (18 - 18)  SpO2: 99% (01 Dec 2018 09:38) (95% - 99%)    PHYSICAL EXAM   General: NAD  HEENT: PERRLA, EOMI   Neck: supple  CV: (+) S1/S2 RRR  Lungs: clear to auscultation, no wheezes or rales  Abdomen: soft, non-tender, non-distended (+) BS  Ext: +1 - +2 edema bilateral LE   Skin: generalized petechiae in UE   Neuro: alert and oriented X 3, no focal deficits  Central Line: PIV              LABS:                        12.1   0.2   )-----------( 27       ( 01 Dec 2018 10:44 )             34.6         Mean Cell Volume : 101.0 fl  Mean Cell Hemoglobin : 35.3 pg  Mean Cell Hemoglobin Concentration : 35.0 gm/dL  Auto Neutrophil # : x  Auto Lymphocyte # : x  Auto Monocyte # : x  Auto Eosinophil # : x  Auto Basophil # : x  Auto Neutrophil % : x  Auto Lymphocyte % : x  Auto Monocyte % : x  Auto Eosinophil % : x  Auto Basophil % : x      12-01    143  |  105  |  54<H>  ----------------------------<  220<H>  4.3   |  26  |  1.50<H>    Ca    9.2      01 Dec 2018 10:44  Phos  3.5     12-01  Mg     1.9     12-01    TPro  6.5  /  Alb  3.6  /  TBili  1.2  /  DBili  x   /  AST  31  /  ALT  54<H>  /  AlkPhos  79  12-01  Mg 1.9  Phos 3.5    Uric Acid 6.0      RADIOLOGY & ADDITIONAL STUDIES:

## 2018-12-01 NOTE — PROGRESS NOTE ADULT - PROBLEM SELECTOR PLAN 1
Rabbit ATG/CSA/Prednisone  CSA  100mg PO BID for previous renal insufficiency, monitor levels  Continue on current dose of 100 mg twice daily.   Monitor CBC/Lytes and transfuse/replete PRN  Strict Is and Os/Daily weights/Mouth Care Rabbit ATG/CSA/Prednisone  CSA 100mg PO BID for previous renal insufficiency, monitor levels  Continue on current dose of 100 mg twice daily.   Monitor CBC/Lytes and transfuse/replete PRN  Strict Is and Os/Daily weights/Mouth Care

## 2018-12-02 LAB
ALBUMIN SERPL ELPH-MCNC: 3.7 G/DL — SIGNIFICANT CHANGE UP (ref 3.3–5)
ALP SERPL-CCNC: 83 U/L — SIGNIFICANT CHANGE UP (ref 40–120)
ALT FLD-CCNC: 64 U/L — HIGH (ref 10–45)
ANION GAP SERPL CALC-SCNC: 11 MMOL/L — SIGNIFICANT CHANGE UP (ref 5–17)
AST SERPL-CCNC: 31 U/L — SIGNIFICANT CHANGE UP (ref 10–40)
BILIRUB SERPL-MCNC: 1.2 MG/DL — SIGNIFICANT CHANGE UP (ref 0.2–1.2)
BUN SERPL-MCNC: 50 MG/DL — HIGH (ref 7–23)
CALCIUM SERPL-MCNC: 9.3 MG/DL — SIGNIFICANT CHANGE UP (ref 8.4–10.5)
CHLORIDE SERPL-SCNC: 104 MMOL/L — SIGNIFICANT CHANGE UP (ref 96–108)
CO2 SERPL-SCNC: 28 MMOL/L — SIGNIFICANT CHANGE UP (ref 22–31)
CREAT SERPL-MCNC: 1.37 MG/DL — HIGH (ref 0.5–1.3)
GLUCOSE SERPL-MCNC: 263 MG/DL — HIGH (ref 70–99)
HCT VFR BLD CALC: 34.9 % — LOW (ref 39–50)
HGB BLD-MCNC: 12.1 G/DL — LOW (ref 13–17)
LDH SERPL L TO P-CCNC: 160 U/L — SIGNIFICANT CHANGE UP (ref 50–242)
MAGNESIUM SERPL-MCNC: 1.9 MG/DL — SIGNIFICANT CHANGE UP (ref 1.6–2.6)
MCHC RBC-ENTMCNC: 34.6 GM/DL — SIGNIFICANT CHANGE UP (ref 32–36)
MCHC RBC-ENTMCNC: 35.2 PG — HIGH (ref 27–34)
MCV RBC AUTO: 102 FL — HIGH (ref 80–100)
PHOSPHATE SERPL-MCNC: 3.8 MG/DL — SIGNIFICANT CHANGE UP (ref 2.5–4.5)
PLATELET # BLD AUTO: 23 K/UL — LOW (ref 150–400)
POTASSIUM SERPL-MCNC: 4.5 MMOL/L — SIGNIFICANT CHANGE UP (ref 3.5–5.3)
POTASSIUM SERPL-SCNC: 4.5 MMOL/L — SIGNIFICANT CHANGE UP (ref 3.5–5.3)
PROT SERPL-MCNC: 6.5 G/DL — SIGNIFICANT CHANGE UP (ref 6–8.3)
RBC # BLD: 3.42 M/UL — LOW (ref 4.2–5.8)
RBC # FLD: 15.3 % — HIGH (ref 10.3–14.5)
SODIUM SERPL-SCNC: 143 MMOL/L — SIGNIFICANT CHANGE UP (ref 135–145)
URATE SERPL-MCNC: 5.7 MG/DL — SIGNIFICANT CHANGE UP (ref 3.4–8.8)
WBC # BLD: 0.4 K/UL — CRITICAL LOW (ref 3.8–10.5)
WBC # FLD AUTO: 0.4 K/UL — CRITICAL LOW (ref 3.8–10.5)

## 2018-12-02 PROCEDURE — 70450 CT HEAD/BRAIN W/O DYE: CPT | Mod: 26

## 2018-12-02 PROCEDURE — 99232 SBSQ HOSP IP/OBS MODERATE 35: CPT

## 2018-12-02 RX ORDER — ACETAMINOPHEN 500 MG
650 TABLET ORAL ONCE
Qty: 0 | Refills: 0 | Status: COMPLETED | OUTPATIENT
Start: 2018-12-02 | End: 2018-12-02

## 2018-12-02 RX ADMIN — ATORVASTATIN CALCIUM 10 MILLIGRAM(S): 80 TABLET, FILM COATED ORAL at 21:29

## 2018-12-02 RX ADMIN — LORATADINE 10 MILLIGRAM(S): 10 TABLET ORAL at 11:38

## 2018-12-02 RX ADMIN — Medication 40 MILLIGRAM(S): at 05:49

## 2018-12-02 RX ADMIN — CARVEDILOL PHOSPHATE 25 MILLIGRAM(S): 80 CAPSULE, EXTENDED RELEASE ORAL at 05:49

## 2018-12-02 RX ADMIN — Medication 400 MILLIGRAM(S): at 05:49

## 2018-12-02 RX ADMIN — Medication 650 MILLIGRAM(S): at 01:16

## 2018-12-02 RX ADMIN — LOSARTAN POTASSIUM 25 MILLIGRAM(S): 100 TABLET, FILM COATED ORAL at 05:49

## 2018-12-02 RX ADMIN — Medication 200 MILLIGRAM(S): at 11:38

## 2018-12-02 RX ADMIN — FLUCONAZOLE 100 MILLIGRAM(S): 150 TABLET ORAL at 11:38

## 2018-12-02 RX ADMIN — Medication 1 DROP(S): at 00:44

## 2018-12-02 RX ADMIN — Medication 100 MILLIGRAM(S): at 16:26

## 2018-12-02 RX ADMIN — CYCLOSPORINE 100 MILLIGRAM(S): 100 CAPSULE ORAL at 17:28

## 2018-12-02 RX ADMIN — Medication 650 MILLIGRAM(S): at 02:15

## 2018-12-02 RX ADMIN — LOSARTAN POTASSIUM 25 MILLIGRAM(S): 100 TABLET, FILM COATED ORAL at 17:28

## 2018-12-02 RX ADMIN — CARVEDILOL PHOSPHATE 25 MILLIGRAM(S): 80 CAPSULE, EXTENDED RELEASE ORAL at 17:28

## 2018-12-02 RX ADMIN — Medication 1 DROP(S): at 17:28

## 2018-12-02 RX ADMIN — Medication 1 DROP(S): at 11:38

## 2018-12-02 RX ADMIN — Medication 400 MILLIGRAM(S): at 17:28

## 2018-12-02 RX ADMIN — Medication 1 DROP(S): at 05:49

## 2018-12-02 RX ADMIN — Medication 10 MILLIGRAM(S): at 21:29

## 2018-12-02 RX ADMIN — CYCLOSPORINE 100 MILLIGRAM(S): 100 CAPSULE ORAL at 05:49

## 2018-12-02 RX ADMIN — SODIUM CHLORIDE 30 MILLILITER(S): 9 INJECTION INTRAMUSCULAR; INTRAVENOUS; SUBCUTANEOUS at 13:44

## 2018-12-02 NOTE — PROGRESS NOTE ADULT - ATTENDING COMMENTS
82yo M with hx AA in 2013 s/p equine ATG/CSA/Prednisone/Promacta, admitted on 11/26 for relapsed AA -started on rabbit ATG/CSA/Prednisone day 4    -monitor counts, transfuse PRN  -cont prophylaxis w/ Diflucan/Bactrim/Acyclovir. wbc -0.5 today -will start Levaquin prophylaxis  -monitor for fevers, monitor for reactions to ATG  -CSA level 59, cont CSA 100mg twice daily -will not increase CSA due to creatinine inc'ed in this pt,  wanted to continue flat dose CSA  -monitor creatinine, on standing Lasix, monitor weights and I/O's  -d/c home after ATG ends if stable, on 12/1 -d/c on all antimicrobials for prophylaxis. 84yo M with hx AA in 2013 s/p equine ATG/CSA/Prednisone/Promacta, admitted on 11/26 for relapsed AA -started on rabbit ATG/CSA/Prednisone day 4    -monitor counts, transfuse PRN  -cont prophylaxis w/ Diflucan/Bactrim/Acyclovir. wbc -0.5 today -will start Levaquin prophylaxis  -monitor for fevers, monitor for reactions to ATG  -CSA level 59, cont CSA 100mg twice daily -will not increase CSA due to creatinine inc'ed in this pt,  wanted to continue flat dose CSA  -monitor creatinine, on standing Lasix, monitor weights and I/O's.  -d/c home after ATG ends if stable, on 12/1 -d/c on all antimicrobials for prophylaxis.

## 2018-12-02 NOTE — CHART NOTE - NSCHARTNOTEFT_GEN_A_CORE
Patient is a 83y old  Male who presents with Aplastic Anemia (01 Dec 2018 10:23). Alerted by RN that pt c/o HA 5/10 on pain scale. Seen and examined at bedside. no c/o cp or sob.       Vital Signs Last 24 Hrs  T(C): 36 (02 Dec 2018 06:10), Max: 36.7 (01 Dec 2018 21:33)  T(F): 96.8 (02 Dec 2018 06:10), Max: 98.1 (01 Dec 2018 21:33)  HR: 80 (02 Dec 2018 05:38) (68 - 80)  BP: 127/71 (02 Dec 2018 05:38) (100/59 - 130/72)  BP(mean): --  RR: 18 (02 Dec 2018 05:38) (18 - 18)  SpO2: 100% (02 Dec 2018 05:38) (98% - 100%)      Labs:                          12.1   0.2   )-----------( 27       ( 01 Dec 2018 10:44 )             34.6     12-01    143  |  105  |  54<H>  ----------------------------<  220<H>  4.3   |  26  |  1.50<H>    Ca    9.2      01 Dec 2018 10:44  Phos  3.5     12-01  Mg     1.9     12-01    TPro  6.5  /  Alb  3.6  /  TBili  1.2  /  DBili  x   /  AST  31  /  ALT  54<H>  /  AlkPhos  79  12-01        Radiology:    Physical Exam:  General: WN/WD NAD  Neurology: A&Ox3, nonfocal, MONZON x 4  Head:  Normocephalic, atraumatic  Respiratory: CTA B/L  CV: RRR, S1S2, no murmur  Abdominal: Soft, NT, ND no palpable mass  MSK: No edema, + peripheral pulses, FROM all 4 extremity    Assessment & Plan:  HPI:  This is an 82 yo M with PMHx of CAD status post PCI with GREYSON x 4 in 2008, cardiomyopathy (EF 20-25%) with ICD placement in 2014, SDH with evacuation after mechanical fall in 2014 treated at [facility], chronic Afib (no coumadin 2/2 thrombocytopenia) and Aplastic Anemia with PNH subpopulation originally diagnosed in 2013 treated with Horse ATG/CSA/Prednisone now admitted for relapsed disease and treatment with Rabbit ATG/CSA/Prednisone.    The patients history dates back to June of 2013 when he was initially diagnosed and treated with Horse ATG/CSA/Prednisone. The patient was continued on CSA and Prednisone through June of 2014 and then stopped for partial remission. The patients platelets began to drop in July of 2015 and he was suspected of relapsed disease. At that time CSA was restarted through October of 2015. At that time the patient was not responding well to CSA and was found to have worsening renal function despite tapering dose and Promacta was then started. 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 The patient was then sent for a colonoscopy/capsule endoscopy that revealed a small bowel lesion suspicious for malignancy. The patient then had an exploratory laparotomy with small bowel resection. In 10/10/17 following normalization of platelets the platelet count began to drop slowly and the patient became thrombocytopenic. Promacta was discontinued on 11/13/18. The patients latest PNH screen is normal and last BM bx completed on 1018/18 shows decreased megakaryocytes.     Upon admission the patient denies dizziness, visual changes, chest pain, palpitations, SOB, abdominal pain, nausea, vomiting, diarrhea, melena hematuria or dysuria. (26 Nov 2018 10:39)    >Noted plt 27.   >Tylenals for pain management  >CT of head to r/o ICH        Follow up with Attending in AM.

## 2018-12-02 NOTE — PROGRESS NOTE ADULT - SUBJECTIVE AND OBJECTIVE BOX
Diagnosis:    Protocol/Chemo Regimen:    Day:     Pt endorsed:    Review of Systems:     Pain scale:     Diet:     Allergies    No Known Allergies    Intolerances        ANTIMICROBIALS  acyclovir   Oral Tab/Cap 400 milliGRAM(s) Oral every 12 hours  fluconAZOLE   Tablet 100 milliGRAM(s) Oral daily  levoFLOXacin  Tablet 250 milliGRAM(s) Oral every 24 hours  trimethoprim  160 mG/sulfamethoxazole 800 mG 1 Tablet(s) Oral <User Schedule>      HEME/ONC MEDICATIONS      STANDING MEDICATIONS  artificial  tears Solution 1 Drop(s) Both EYES every 6 hours  atorvastatin 10 milliGRAM(s) Oral at bedtime  carvedilol 25 milliGRAM(s) Oral every 12 hours  cycloSPORINE  (SandIMMUNE)  Solution 100 milliGRAM(s) Oral every 12 hours  docusate sodium 200 milliGRAM(s) Oral daily  EPINEPHrine     1 mG/mL Injectable 0.3 milliGRAM(s) IntraMuscular once  furosemide    Tablet 40 milliGRAM(s) Oral daily  loratadine 10 milliGRAM(s) Oral daily  losartan 25 milliGRAM(s) Oral every 12 hours  melatonin 10 milliGRAM(s) Oral at bedtime  predniSONE   Tablet 100 milliGRAM(s) Oral every 24 hours  sodium chloride 0.9%. 1000 milliLiter(s) IV Continuous <Continuous>      PRN MEDICATIONS  acetaminophen   Tablet .. 650 milliGRAM(s) Oral every 6 hours PRN        Vital Signs Last 24 Hrs  T(C): 35.6 (02 Dec 2018 09:26), Max: 36.7 (01 Dec 2018 21:33)  T(F): 96 (02 Dec 2018 09:26), Max: 98.1 (01 Dec 2018 21:33)  HR: 82 (02 Dec 2018 09:26) (68 - 82)  BP: 107/62 (02 Dec 2018 09:26) (100/59 - 127/71)  BP(mean): --  RR: 18 (02 Dec 2018 09:26) (18 - 18)  SpO2: 99% (02 Dec 2018 09:26) (98% - 100%)    PHYSICAL EXAM  General: NAD  HEENT: PERRLA, EOMOI, clear oropharynx, anicteric sclera, pink conjunctiva  Neck: supple  CV: (+) S1/S2 RRR  Lungs: clear to auscultation, no wheezes or rales  Abdomen: soft, non-tender, non-distended (+) BS  Ext: no clubbing, cyanosis or edema  Skin: no rashes and no petechiae  Neuro: alert and oriented X 3, no focal deficits  Central Line:     RECENT CULTURES:        LABS:                        12.1   0.2   )-----------( 27       ( 01 Dec 2018 10:44 )             34.6         Mean Cell Volume : 101.0 fl  Mean Cell Hemoglobin : 35.3 pg  Mean Cell Hemoglobin Concentration : 35.0 gm/dL  Auto Neutrophil # : x  Auto Lymphocyte # : x  Auto Monocyte # : x  Auto Eosinophil # : x  Auto Basophil # : x  Auto Neutrophil % : x  Auto Lymphocyte % : x  Auto Monocyte % : x  Auto Eosinophil % : x  Auto Basophil % : x      12-01    143  |  105  |  54<H>  ----------------------------<  220<H>  4.3   |  26  |  1.50<H>    Ca    9.2      01 Dec 2018 10:44  Phos  3.5     12-01  Mg     1.9     12-01    TPro  6.5  /  Alb  3.6  /  TBili  1.2  /  DBili  x   /  AST  31  /  ALT  54<H>  /  AlkPhos  79  12-01      Mg 1.9  Phos 3.5            Uric Acid 6.0        RADIOLOGY & ADDITIONAL STUDIES: Diagnosis: Aplastic Anemia    Protocol/Chemo Regimen: Rabbit ATG, CSA, Prednisone    Day: 7    Pt endorsed: no acute complaints, feeling well    Review of Systems: Denies N/V/D, CP, SOB, HA.     Pain scale: Denies    Diet: Regular     Allergies: No Known Allergies    ANTIMICROBIALS  acyclovir   Oral Tab/Cap 400 milliGRAM(s) Oral every 12 hours  fluconAZOLE   Tablet 100 milliGRAM(s) Oral daily  levoFLOXacin  Tablet 250 milliGRAM(s) Oral every 24 hours  trimethoprim  160 mG/sulfamethoxazole 800 mG 1 Tablet(s) Oral <User Schedule>    STANDING MEDICATIONS  artificial  tears Solution 1 Drop(s) Both EYES every 6 hours  atorvastatin 10 milliGRAM(s) Oral at bedtime  carvedilol 25 milliGRAM(s) Oral every 12 hours  cycloSPORINE  (SandIMMUNE)  Solution 100 milliGRAM(s) Oral every 12 hours  docusate sodium 200 milliGRAM(s) Oral daily  EPINEPHrine     1 mG/mL Injectable 0.3 milliGRAM(s) IntraMuscular once  furosemide    Tablet 40 milliGRAM(s) Oral daily  loratadine 10 milliGRAM(s) Oral daily  losartan 25 milliGRAM(s) Oral every 12 hours  melatonin 10 milliGRAM(s) Oral at bedtime  predniSONE   Tablet 100 milliGRAM(s) Oral every 24 hours  sodium chloride 0.9%. 1000 milliLiter(s) IV Continuous <Continuous>    PRN MEDICATIONS  acetaminophen   Tablet .. 650 milliGRAM(s) Oral every 6 hours PRN    Vital Signs Last 24 Hrs  T(C): 35.6 (02 Dec 2018 09:26), Max: 36.7 (01 Dec 2018 21:33)  T(F): 96 (02 Dec 2018 09:26), Max: 98.1 (01 Dec 2018 21:33)  HR: 82 (02 Dec 2018 09:26) (68 - 82)  BP: 107/62 (02 Dec 2018 09:26) (100/59 - 127/71)  BP(mean): --  RR: 18 (02 Dec 2018 09:26) (18 - 18)  SpO2: 99% (02 Dec 2018 09:26) (98% - 100%)    PHYSICAL EXAM  General: NAD  HEENT: PERRLA, EOMI   Neck: supple  CV: (+) S1/S2 RRR  Lungs: clear to auscultation, no wheezes or rales  Abdomen: soft, non-tender, non-distended (+) BS  Ext: +1 - +2 edema bilateral LE   Skin: generalized petechiae in UE   Neuro: alert and oriented X 3, no focal deficits  Central Line: PIV       LABS:                        12.1   0.2   )-----------( 27       ( 01 Dec 2018 10:44 )             34.6         Mean Cell Volume : 101.0 fl  Mean Cell Hemoglobin : 35.3 pg  Mean Cell Hemoglobin Concentration : 35.0 gm/dL  Auto Neutrophil # : x  Auto Lymphocyte # : x  Auto Monocyte # : x  Auto Eosinophil # : x  Auto Basophil # : x  Auto Neutrophil % : x  Auto Lymphocyte % : x  Auto Monocyte % : x  Auto Eosinophil % : x  Auto Basophil % : x      12-01    143  |  105  |  54<H>  ----------------------------<  220<H>  4.3   |  26  |  1.50<H>    Ca    9.2      01 Dec 2018 10:44  Phos  3.5     12-01  Mg     1.9     12-01    TPro  6.5  /  Alb  3.6  /  TBili  1.2  /  DBili  x   /  AST  31  /  ALT  54<H>  /  AlkPhos  79  12-01    Mg 1.9  Phos 3.5    Uric Acid 6.0    RADIOLOGY & ADDITIONAL STUDIES:

## 2018-12-02 NOTE — PROGRESS NOTE ADULT - PROBLEM SELECTOR PLAN 4
Prior stenting  Last known EF 25% in 2016  Continue Lasix 40mg PO daily with KCL supplementation  Continue Coreg 25mg BID and Cozaar.   EKG showing Afib rate controlled Prior stenting  Last known EF 25% in 2016  Continue Lasix 40mg PO daily with KCL supplementation  Continue Coreg 25mg BID and Cozaar.   Afib rate controlled.

## 2018-12-02 NOTE — PROVIDER CONTACT NOTE (CRITICAL VALUE NOTIFICATION) - ASSESSMENT
no bleeding/afebrile
Patient A&Ox4; patient denies SOB, headache, chest pain and abdominal pain. No signs and symptoms of bleeding.
stable

## 2018-12-02 NOTE — PROGRESS NOTE ADULT - PROBLEM SELECTOR PLAN 1
Rabbit ATG/CSA/Prednisone  CSA 100mg PO BID for previous renal insufficiency, monitor levels  Continue on current dose of 100 mg twice daily.   Monitor CBC/Lytes and transfuse/replete PRN  Strict Is and Os/Daily weights/Mouth Care

## 2018-12-02 NOTE — PROGRESS NOTE ADULT - ASSESSMENT
This is an 84 yo M with PMHx of CAD status post PCI with GREYSON x 4 in 2008, cardiomyopathy (EF 20-25%) with ICD placement in 2014, SDH with evacuation after mechanical fall in 2014 treated at [facility], chronic Afib (no coumadin 2/2 thrombocytopenia) and Aplastic Anemia with PNH subpopulation originally diagnosed in 2013 treated with Horse ATG/CSA/Prednisone now admitted for relapsed disease and treatment with Rabbit ATG/CSA/Prednisone. Promacta was discontinued on 11/13/18. The patients latest PNH screen is normal and last BM bx completed on 1018/18 shows decreased megakaryocytes. Patient has pancytopenia secondary to ATG and disease condition.            .

## 2018-12-02 NOTE — PROVIDER CONTACT NOTE (CRITICAL VALUE NOTIFICATION) - RECOMMENDATIONS
will monitor
Maintain bleeding precautions.
Monitor for signs of bleeding. Possible transfusion of 1 bag of platelets

## 2018-12-03 ENCOUNTER — APPOINTMENT (OUTPATIENT)
Dept: HEMATOLOGY ONCOLOGY | Facility: CLINIC | Age: 83
End: 2018-12-03

## 2018-12-03 LAB
ALBUMIN SERPL ELPH-MCNC: 3.5 G/DL — SIGNIFICANT CHANGE UP (ref 3.3–5)
ALP SERPL-CCNC: 77 U/L — SIGNIFICANT CHANGE UP (ref 40–120)
ALT FLD-CCNC: 68 U/L — HIGH (ref 10–45)
ANION GAP SERPL CALC-SCNC: 12 MMOL/L — SIGNIFICANT CHANGE UP (ref 5–17)
APTT BLD: 25.8 SEC — LOW (ref 27.5–36.3)
AST SERPL-CCNC: 27 U/L — SIGNIFICANT CHANGE UP (ref 10–40)
BASOPHILS # BLD AUTO: 0 K/UL — SIGNIFICANT CHANGE UP (ref 0–0.2)
BILIRUB SERPL-MCNC: 1.1 MG/DL — SIGNIFICANT CHANGE UP (ref 0.2–1.2)
BLD GP AB SCN SERPL QL: NEGATIVE — SIGNIFICANT CHANGE UP
BUN SERPL-MCNC: 47 MG/DL — HIGH (ref 7–23)
CALCIUM SERPL-MCNC: 9 MG/DL — SIGNIFICANT CHANGE UP (ref 8.4–10.5)
CHLORIDE SERPL-SCNC: 103 MMOL/L — SIGNIFICANT CHANGE UP (ref 96–108)
CO2 SERPL-SCNC: 26 MMOL/L — SIGNIFICANT CHANGE UP (ref 22–31)
CREAT SERPL-MCNC: 1.39 MG/DL — HIGH (ref 0.5–1.3)
EOSINOPHIL # BLD AUTO: 0 K/UL — SIGNIFICANT CHANGE UP (ref 0–0.5)
FIBRINOGEN PPP-MCNC: 402 MG/DL — SIGNIFICANT CHANGE UP (ref 350–510)
GLUCOSE SERPL-MCNC: 163 MG/DL — HIGH (ref 70–99)
HCT VFR BLD CALC: 33.8 % — LOW (ref 39–50)
HGB BLD-MCNC: 12 G/DL — LOW (ref 13–17)
INR BLD: 1.07 RATIO — SIGNIFICANT CHANGE UP (ref 0.88–1.16)
LDH SERPL L TO P-CCNC: 155 U/L — SIGNIFICANT CHANGE UP (ref 50–242)
LYMPHOCYTES # BLD AUTO: 0.1 K/UL — LOW (ref 1–3.3)
LYMPHOCYTES # BLD AUTO: 2 % — LOW (ref 13–44)
MAGNESIUM SERPL-MCNC: 1.8 MG/DL — SIGNIFICANT CHANGE UP (ref 1.6–2.6)
MCHC RBC-ENTMCNC: 35.6 GM/DL — SIGNIFICANT CHANGE UP (ref 32–36)
MCHC RBC-ENTMCNC: 36 PG — HIGH (ref 27–34)
MCV RBC AUTO: 101 FL — HIGH (ref 80–100)
MONOCYTES # BLD AUTO: 0.1 K/UL — SIGNIFICANT CHANGE UP (ref 0–0.9)
MONOCYTES NFR BLD AUTO: 16 % — HIGH (ref 2–14)
NEUTROPHILS # BLD AUTO: 0.5 K/UL — LOW (ref 1.8–7.4)
NEUTROPHILS NFR BLD AUTO: 82 % — HIGH (ref 43–77)
PHOSPHATE SERPL-MCNC: 3.5 MG/DL — SIGNIFICANT CHANGE UP (ref 2.5–4.5)
PLATELET # BLD AUTO: 18 K/UL — CRITICAL LOW (ref 150–400)
PLATELET # BLD AUTO: 30 K/UL — LOW (ref 150–400)
PLATELET # BLD AUTO: 38 K/UL — LOW (ref 150–400)
POTASSIUM SERPL-MCNC: 4.2 MMOL/L — SIGNIFICANT CHANGE UP (ref 3.5–5.3)
POTASSIUM SERPL-SCNC: 4.2 MMOL/L — SIGNIFICANT CHANGE UP (ref 3.5–5.3)
PROT SERPL-MCNC: 6 G/DL — SIGNIFICANT CHANGE UP (ref 6–8.3)
PROTHROM AB SERPL-ACNC: 12.2 SEC — SIGNIFICANT CHANGE UP (ref 10–12.9)
RBC # BLD: 3.35 M/UL — LOW (ref 4.2–5.8)
RBC # FLD: 15.2 % — HIGH (ref 10.3–14.5)
RH IG SCN BLD-IMP: POSITIVE — SIGNIFICANT CHANGE UP
SODIUM SERPL-SCNC: 141 MMOL/L — SIGNIFICANT CHANGE UP (ref 135–145)
URATE SERPL-MCNC: 5.8 MG/DL — SIGNIFICANT CHANGE UP (ref 3.4–8.8)
WBC # BLD: 0.7 K/UL — CRITICAL LOW (ref 3.8–10.5)
WBC # FLD AUTO: 0.7 K/UL — CRITICAL LOW (ref 3.8–10.5)

## 2018-12-03 PROCEDURE — 99232 SBSQ HOSP IP/OBS MODERATE 35: CPT | Mod: GC

## 2018-12-03 RX ADMIN — Medication 100 MILLIGRAM(S): at 17:58

## 2018-12-03 RX ADMIN — Medication 200 MILLIGRAM(S): at 11:44

## 2018-12-03 RX ADMIN — Medication 400 MILLIGRAM(S): at 17:33

## 2018-12-03 RX ADMIN — CYCLOSPORINE 100 MILLIGRAM(S): 100 CAPSULE ORAL at 06:39

## 2018-12-03 RX ADMIN — LOSARTAN POTASSIUM 25 MILLIGRAM(S): 100 TABLET, FILM COATED ORAL at 06:38

## 2018-12-03 RX ADMIN — Medication 10 MILLIGRAM(S): at 22:25

## 2018-12-03 RX ADMIN — Medication 1 TABLET(S): at 11:44

## 2018-12-03 RX ADMIN — Medication 1 DROP(S): at 06:39

## 2018-12-03 RX ADMIN — Medication 650 MILLIGRAM(S): at 14:17

## 2018-12-03 RX ADMIN — CYCLOSPORINE 100 MILLIGRAM(S): 100 CAPSULE ORAL at 17:33

## 2018-12-03 RX ADMIN — LOSARTAN POTASSIUM 25 MILLIGRAM(S): 100 TABLET, FILM COATED ORAL at 17:33

## 2018-12-03 RX ADMIN — FLUCONAZOLE 100 MILLIGRAM(S): 150 TABLET ORAL at 11:44

## 2018-12-03 RX ADMIN — CARVEDILOL PHOSPHATE 25 MILLIGRAM(S): 80 CAPSULE, EXTENDED RELEASE ORAL at 06:38

## 2018-12-03 RX ADMIN — SODIUM CHLORIDE 30 MILLILITER(S): 9 INJECTION INTRAMUSCULAR; INTRAVENOUS; SUBCUTANEOUS at 11:44

## 2018-12-03 RX ADMIN — Medication 40 MILLIGRAM(S): at 06:38

## 2018-12-03 RX ADMIN — Medication 1 DROP(S): at 00:06

## 2018-12-03 RX ADMIN — CARVEDILOL PHOSPHATE 25 MILLIGRAM(S): 80 CAPSULE, EXTENDED RELEASE ORAL at 17:33

## 2018-12-03 RX ADMIN — Medication 400 MILLIGRAM(S): at 06:38

## 2018-12-03 RX ADMIN — Medication 650 MILLIGRAM(S): at 15:38

## 2018-12-03 RX ADMIN — ATORVASTATIN CALCIUM 10 MILLIGRAM(S): 80 TABLET, FILM COATED ORAL at 22:25

## 2018-12-03 RX ADMIN — LORATADINE 10 MILLIGRAM(S): 10 TABLET ORAL at 11:44

## 2018-12-03 NOTE — DIETITIAN INITIAL EVALUATION ADULT. - ADHERENCE
Pt stated he will not add additional salt to foods and will usually take smaller amounts of dessert items

## 2018-12-03 NOTE — PROGRESS NOTE ADULT - ASSESSMENT
This is an 82 yo M with PMHx of CAD status post PCI with GREYSON x 4 in 2008, cardiomyopathy (EF 20-25%) with ICD placement in 2014, SDH with evacuation after mechanical fall in 2014 treated at [facility], chronic Afib (no coumadin 2/2 thrombocytopenia) and Aplastic Anemia with PNH subpopulation originally diagnosed in 2013 treated with Horse ATG/CSA/Prednisone now admitted for relapsed disease and treatment with Rabbit ATG/CSA/Prednisone. Promacta was discontinued on 11/13/18. The patients latest PNH screen is normal and last BM bx completed on 1018/18 shows decreased megakaryocytes. Patient has pancytopenia secondary to ATG and disease condition.            . This is an 84 yo M with PMHx of CAD status post PCI with GREYSON x 4 in 2008, cardiomyopathy (EF 20-25%) with ICD placement in 2014, SDH with evacuation after mechanical fall in 2014 treated at [facility], chronic Afib (no coumadin 2/2 thrombocytopenia) and Aplastic Anemia with PNH subpopulation originally diagnosed in 2013 treated with Horse ATG/CSA/Prednisone now admitted for relapsed disease and treatment with Rabbit ATG/CSA/Prednisone. The patients latest PNH screen is normal and last BM bx completed on 1018/18 shows decreased megakaryocytes. Patient has pancytopenia secondary to ATG and disease condition.            .

## 2018-12-03 NOTE — PROGRESS NOTE ADULT - ATTENDING COMMENTS
84yo M with hx AA in 2013 s/p equine ATG/CSA/Prednisone/Promacta, admitted on 11/26 for relapsed AA -started on rabbit ATG/CSA/Prednisone day 4    -monitor counts, transfuse PRN  -cont prophylaxis w/ Diflucan/Bactrim/Acyclovir. wbc -0.5 today -will start Levaquin prophylaxis  -monitor for fevers, monitor for reactions to ATG  -CSA level 59, cont CSA 100mg twice daily -will not increase CSA due to creatinine inc'ed in this pt,  wanted to continue flat dose CSA  -monitor creatinine, on standing Lasix, monitor weights and I/O's.  -d/c home after ATG ends if stable, on 12/1 -d/c on all antimicrobials for prophylaxis.

## 2018-12-03 NOTE — DIETITIAN INITIAL EVALUATION ADULT. - ORAL INTAKE PTA
Pt reports eating well with good appetite consuming 3 meals per day with no recent changes in appetite or intake/good

## 2018-12-03 NOTE — PROGRESS NOTE ADULT - PROBLEM SELECTOR PLAN 1
Rabbit ATG/CSA/Prednisone  CSA 100mg PO BID for previous renal insufficiency, monitor levels  Continue on current dose of 100 mg twice daily.   Monitor CBC/Lytes and transfuse/replete PRN  Strict Is and Os/Daily weights/Mouth Care Rabbit ATG/CSA/Prednisone  CSA 100mg PO BID for previous renal insufficiency, monitor levels  Continue on current dose of 100 mg twice daily.   Monitor CBC/Lytes and transfuse/replete PRN  Strict Is and Os/Daily weights/Mouth Care  12/3 will attempt to get a PICC line today, transfusing plts to a goal plt >50.

## 2018-12-03 NOTE — PROGRESS NOTE ADULT - PROBLEM SELECTOR PLAN 3
mild renal insufficiency   diuretic induced vs sec Cyclosporine  monitor closely. Mild renal insufficiency   diuretic induced vs sec Cyclosporine, improving.   monitor closely.

## 2018-12-03 NOTE — DIETITIAN INITIAL EVALUATION ADULT. - NS AS NUTRI INTERV MEALS SNACK
1. Continue diet as ordered, 2. Continue to encourage po intake and obtain/honor food preferences as able-provided alternative cold menu items list, 3. Monitor PO intake, diet tolerance, weight trends, labs, and skin integrity, 4. RD to remain available as needed

## 2018-12-03 NOTE — PROGRESS NOTE ADULT - SUBJECTIVE AND OBJECTIVE BOX
Diagnosis: Aplastic Anemia    Protocol/Chemo Regimen: Rabbit ATG, CSA, Prednisone    Day: 8    Pt endorsed: no acute complaints, feeling well    Review of Systems: Denies N/V/D, CP, SOB, HA.     Pain scale: Denies     Diet: Regular     Allergies    No Known Allergies    Intolerances        ANTIMICROBIALS  acyclovir   Oral Tab/Cap 400 milliGRAM(s) Oral every 12 hours  fluconAZOLE   Tablet 100 milliGRAM(s) Oral daily  levoFLOXacin  Tablet 250 milliGRAM(s) Oral every 24 hours  trimethoprim  160 mG/sulfamethoxazole 800 mG 1 Tablet(s) Oral <User Schedule>      HEME/ONC MEDICATIONS      STANDING MEDICATIONS  artificial  tears Solution 1 Drop(s) Both EYES every 6 hours  atorvastatin 10 milliGRAM(s) Oral at bedtime  carvedilol 25 milliGRAM(s) Oral every 12 hours  cycloSPORINE  (SandIMMUNE)  Solution 100 milliGRAM(s) Oral every 12 hours  docusate sodium 200 milliGRAM(s) Oral daily  EPINEPHrine     1 mG/mL Injectable 0.3 milliGRAM(s) IntraMuscular once  furosemide    Tablet 40 milliGRAM(s) Oral daily  loratadine 10 milliGRAM(s) Oral daily  losartan 25 milliGRAM(s) Oral every 12 hours  melatonin 10 milliGRAM(s) Oral at bedtime  predniSONE   Tablet 100 milliGRAM(s) Oral every 24 hours  sodium chloride 0.9%. 1000 milliLiter(s) IV Continuous <Continuous>      PRN MEDICATIONS  acetaminophen   Tablet .. 650 milliGRAM(s) Oral every 6 hours PRN        Vital Signs Last 24 Hrs  T(C): 35.6 (03 Dec 2018 09:25), Max: 36.2 (02 Dec 2018 17:10)  T(F): 96.1 (03 Dec 2018 09:25), Max: 97.1 (02 Dec 2018 17:10)  HR: 76 (03 Dec 2018 09:25) (50 - 83)  BP: 116/69 (03 Dec 2018 09:25) (108/67 - 123/60)  BP(mean): --  RR: 18 (03 Dec 2018 09:25) (18 - 18)  SpO2: 99% (03 Dec 2018 09:25) (96% - 99%)    PHYSICAL EXAM  General: NAD  HEENT: PERRLA, EOMI   Neck: supple  CV: (+) S1/S2 RRR  Lungs: clear to auscultation, no wheezes or rales  Abdomen: soft, non-tender, non-distended (+) BS  Ext: no clubbing, cyanosis or edema  Skin: no rashes and no petechiae  Neuro: alert and oriented X 3, no focal deficits  Central Line: PIV C/D/I     RECENT CULTURES: No recent cultures.         LABS:                        12.0   0.7   )-----------( 18       ( 03 Dec 2018 09:51 )             33.8         Mean Cell Volume : 101.0 fl  Mean Cell Hemoglobin : 36.0 pg  Mean Cell Hemoglobin Concentration : 35.6 gm/dL  Auto Neutrophil # : x  Auto Lymphocyte # : x  Auto Monocyte # : x  Auto Eosinophil # : x  Auto Basophil # : x  Auto Neutrophil % : x  Auto Lymphocyte % : x  Auto Monocyte % : x  Auto Eosinophil % : x  Auto Basophil % : x      12-03    141  |  103  |  47<H>  ----------------------------<  163<H>  4.2   |  26  |  1.39<H>    Ca    9.0      03 Dec 2018 09:51  Phos  3.5     12-03  Mg     1.8     12-03    TPro  6.0  /  Alb  3.5  /  TBili  1.1  /  DBili  x   /  AST  27  /  ALT  68<H>  /  AlkPhos  77  12-03      Mg 1.8  Phos 3.5      PT/INR - ( 03 Dec 2018 09:51 )   PT: 12.2 sec;   INR: 1.07 ratio         PTT - ( 03 Dec 2018 09:51 )  PTT:25.8 sec      Uric Acid 5.8        RADIOLOGY & ADDITIONAL STUDIES:  CT Head No Cont (12.02.18 @ 09:04) >  No CT evidence of acute intracranial hemorrhage, brain edema, mass effect   or mid line shift, if symptoms persist consider follow up CT or MRI if no   contraindications.

## 2018-12-03 NOTE — PROVIDER CONTACT NOTE (CRITICAL VALUE NOTIFICATION) - ACTION/TREATMENT ORDERED:
1 unit of platelets ordered.
NO orders received
no PLT TX ordered
No action taken at this time. Will continue to monitor.
No interventions ordered at this time

## 2018-12-03 NOTE — DIETITIAN INITIAL EVALUATION ADULT. - PROBLEM SELECTOR PLAN 4
Last known EF 25% in 2016  Continue Lasix 40mg PO daily with KCL supplementation  Continue Coreg and Cozaar

## 2018-12-03 NOTE — DIETITIAN INITIAL EVALUATION ADULT. - PROBLEM SELECTOR PLAN 1
For Rabbit ATG/CSA/Prednisone  CSA to start at 100mg PO BID for previous renal insufficiency, monitor levels  Monitor CBC/Lytes and transfuse/replete PRN  Strict Is and Os/Daily weights/Mouth Care  IVF

## 2018-12-03 NOTE — PROGRESS NOTE ADULT - PROBLEM SELECTOR PLAN 4
Prior stenting  Last known EF 25% in 2016  Continue Lasix 40mg PO daily with KCL supplementation  Continue Coreg 25mg BID and Cozaar.   Afib rate controlled. Mild transaminitis.  Will monitor closely.

## 2018-12-03 NOTE — PROGRESS NOTE ADULT - PROBLEM SELECTOR PLAN 5
No VTE ppx  patient is thrombocytopenic. Prior stenting  Last known EF 25% in 2016  Continue Lasix 40mg PO daily with KCL supplementation  Continue Coreg 25mg BID and Cozaar.   Afib rate controlled.

## 2018-12-03 NOTE — DIETITIAN INITIAL EVALUATION ADULT. - OTHER INFO
Pt reports UBW of 234 lbs-pt will weigh himself daily at home, noted current admission weight 229.5 lbs (11/26). Pt with no food allergies, takes a multivitamin at home. No N+V, last BM today. Pt with no difficulty chewing/swallowing-has partial upper dentures which fit him well. In house pt reports eating well 75% of meals.

## 2018-12-04 VITALS
RESPIRATION RATE: 18 BRPM | OXYGEN SATURATION: 97 % | HEART RATE: 63 BPM | WEIGHT: 238.32 LBS | DIASTOLIC BLOOD PRESSURE: 55 MMHG | TEMPERATURE: 96 F | SYSTOLIC BLOOD PRESSURE: 110 MMHG

## 2018-12-04 LAB
ALBUMIN SERPL ELPH-MCNC: 3.3 G/DL — SIGNIFICANT CHANGE UP (ref 3.3–5)
ALP SERPL-CCNC: 81 U/L — SIGNIFICANT CHANGE UP (ref 40–120)
ALT FLD-CCNC: 53 U/L — HIGH (ref 10–45)
ANION GAP SERPL CALC-SCNC: 11 MMOL/L — SIGNIFICANT CHANGE UP (ref 5–17)
AST SERPL-CCNC: 20 U/L — SIGNIFICANT CHANGE UP (ref 10–40)
BASOPHILS # BLD AUTO: 0 K/UL — SIGNIFICANT CHANGE UP (ref 0–0.2)
BASOPHILS NFR BLD AUTO: 0 % — SIGNIFICANT CHANGE UP (ref 0–2)
BILIRUB SERPL-MCNC: 0.7 MG/DL — SIGNIFICANT CHANGE UP (ref 0.2–1.2)
BUN SERPL-MCNC: 46 MG/DL — HIGH (ref 7–23)
CALCIUM SERPL-MCNC: 8.5 MG/DL — SIGNIFICANT CHANGE UP (ref 8.4–10.5)
CHLORIDE SERPL-SCNC: 103 MMOL/L — SIGNIFICANT CHANGE UP (ref 96–108)
CO2 SERPL-SCNC: 26 MMOL/L — SIGNIFICANT CHANGE UP (ref 22–31)
CREAT SERPL-MCNC: 1.19 MG/DL — SIGNIFICANT CHANGE UP (ref 0.5–1.3)
EOSINOPHIL # BLD AUTO: 0 K/UL — SIGNIFICANT CHANGE UP (ref 0–0.5)
EOSINOPHIL NFR BLD AUTO: 1.3 % — SIGNIFICANT CHANGE UP (ref 0–6)
GLUCOSE SERPL-MCNC: 270 MG/DL — HIGH (ref 70–99)
HCT VFR BLD CALC: 33.3 % — LOW (ref 39–50)
HGB BLD-MCNC: 11.1 G/DL — LOW (ref 13–17)
LDH SERPL L TO P-CCNC: 162 U/L — SIGNIFICANT CHANGE UP (ref 50–242)
LYMPHOCYTES # BLD AUTO: 0 K/UL — LOW (ref 1–3.3)
LYMPHOCYTES # BLD AUTO: 3.8 % — LOW (ref 13–44)
MAGNESIUM SERPL-MCNC: 2.1 MG/DL — SIGNIFICANT CHANGE UP (ref 1.6–2.6)
MCHC RBC-ENTMCNC: 33.4 GM/DL — SIGNIFICANT CHANGE UP (ref 32–36)
MCHC RBC-ENTMCNC: 33.5 PG — SIGNIFICANT CHANGE UP (ref 27–34)
MCV RBC AUTO: 100 FL — SIGNIFICANT CHANGE UP (ref 80–100)
MONOCYTES # BLD AUTO: 0.1 K/UL — SIGNIFICANT CHANGE UP (ref 0–0.9)
MONOCYTES NFR BLD AUTO: 10.4 % — SIGNIFICANT CHANGE UP (ref 2–14)
NEUTROPHILS # BLD AUTO: 1.1 K/UL — LOW (ref 1.8–7.4)
NEUTROPHILS NFR BLD AUTO: 84.5 % — HIGH (ref 43–77)
PHOSPHATE SERPL-MCNC: 3.1 MG/DL — SIGNIFICANT CHANGE UP (ref 2.5–4.5)
PLATELET # BLD AUTO: 36 K/UL — LOW (ref 150–400)
POTASSIUM SERPL-MCNC: 4.3 MMOL/L — SIGNIFICANT CHANGE UP (ref 3.5–5.3)
POTASSIUM SERPL-SCNC: 4.3 MMOL/L — SIGNIFICANT CHANGE UP (ref 3.5–5.3)
PROT SERPL-MCNC: 5.7 G/DL — LOW (ref 6–8.3)
RBC # BLD: 3.31 M/UL — LOW (ref 4.2–5.8)
RBC # FLD: 15.2 % — HIGH (ref 10.3–14.5)
SODIUM SERPL-SCNC: 140 MMOL/L — SIGNIFICANT CHANGE UP (ref 135–145)
URATE SERPL-MCNC: 5.9 MG/DL — SIGNIFICANT CHANGE UP (ref 3.4–8.8)
WBC # BLD: 1.3 K/UL — LOW (ref 3.8–10.5)
WBC # FLD AUTO: 1.3 K/UL — LOW (ref 3.8–10.5)

## 2018-12-04 PROCEDURE — 70450 CT HEAD/BRAIN W/O DYE: CPT

## 2018-12-04 PROCEDURE — 86850 RBC ANTIBODY SCREEN: CPT

## 2018-12-04 PROCEDURE — 85730 THROMBOPLASTIN TIME PARTIAL: CPT

## 2018-12-04 PROCEDURE — 80158 DRUG ASSAY CYCLOSPORINE: CPT

## 2018-12-04 PROCEDURE — 83615 LACTATE (LD) (LDH) ENZYME: CPT

## 2018-12-04 PROCEDURE — 84100 ASSAY OF PHOSPHORUS: CPT

## 2018-12-04 PROCEDURE — 36569 INSJ PICC 5 YR+ W/O IMAGING: CPT

## 2018-12-04 PROCEDURE — 93005 ELECTROCARDIOGRAM TRACING: CPT

## 2018-12-04 PROCEDURE — 71045 X-RAY EXAM CHEST 1 VIEW: CPT | Mod: 26

## 2018-12-04 PROCEDURE — 85049 AUTOMATED PLATELET COUNT: CPT

## 2018-12-04 PROCEDURE — 85384 FIBRINOGEN ACTIVITY: CPT

## 2018-12-04 PROCEDURE — 80053 COMPREHEN METABOLIC PANEL: CPT

## 2018-12-04 PROCEDURE — 36430 TRANSFUSION BLD/BLD COMPNT: CPT

## 2018-12-04 PROCEDURE — 85610 PROTHROMBIN TIME: CPT

## 2018-12-04 PROCEDURE — 71045 X-RAY EXAM CHEST 1 VIEW: CPT

## 2018-12-04 PROCEDURE — 99239 HOSP IP/OBS DSCHRG MGMT >30: CPT

## 2018-12-04 PROCEDURE — C1751: CPT

## 2018-12-04 PROCEDURE — P9037: CPT

## 2018-12-04 PROCEDURE — 86901 BLOOD TYPING SEROLOGIC RH(D): CPT

## 2018-12-04 PROCEDURE — 86900 BLOOD TYPING SEROLOGIC ABO: CPT

## 2018-12-04 PROCEDURE — 84550 ASSAY OF BLOOD/URIC ACID: CPT

## 2018-12-04 PROCEDURE — 83735 ASSAY OF MAGNESIUM: CPT

## 2018-12-04 PROCEDURE — 85027 COMPLETE CBC AUTOMATED: CPT

## 2018-12-04 RX ADMIN — CARVEDILOL PHOSPHATE 25 MILLIGRAM(S): 80 CAPSULE, EXTENDED RELEASE ORAL at 06:55

## 2018-12-04 RX ADMIN — LORATADINE 10 MILLIGRAM(S): 10 TABLET ORAL at 11:09

## 2018-12-04 RX ADMIN — LOSARTAN POTASSIUM 25 MILLIGRAM(S): 100 TABLET, FILM COATED ORAL at 06:55

## 2018-12-04 RX ADMIN — Medication 1 DROP(S): at 00:55

## 2018-12-04 RX ADMIN — Medication 400 MILLIGRAM(S): at 06:55

## 2018-12-04 RX ADMIN — Medication 1 DROP(S): at 06:56

## 2018-12-04 RX ADMIN — CYCLOSPORINE 100 MILLIGRAM(S): 100 CAPSULE ORAL at 06:56

## 2018-12-04 RX ADMIN — FLUCONAZOLE 100 MILLIGRAM(S): 150 TABLET ORAL at 11:09

## 2018-12-04 RX ADMIN — Medication 40 MILLIGRAM(S): at 06:56

## 2018-12-04 RX ADMIN — SODIUM CHLORIDE 30 MILLILITER(S): 9 INJECTION INTRAMUSCULAR; INTRAVENOUS; SUBCUTANEOUS at 11:09

## 2018-12-04 NOTE — PROGRESS NOTE ADULT - PROBLEM SELECTOR PLAN 1
Rabbit ATG/CSA/Prednisone  CSA 100mg PO BID for previous renal insufficiency, monitor levels  Continue on current dose of 100 mg twice daily.   Monitor CBC/Lytes and transfuse/replete PRN  Strict Is and Os/Daily weights/Mouth Care  12/3 will attempt to get a PICC line today, transfusing plts to a goal plt >50. Rabbit ATG/CSA/Prednisone  CSA 100mg PO BID for previous renal insufficiency, monitor levels  Continue on current dose of 100 mg twice daily.   Monitor CBC/Lytes and transfuse/replete PRN  Strict Is and Os/Daily weights/Mouth Care

## 2018-12-04 NOTE — PROGRESS NOTE ADULT - PROBLEM SELECTOR PROBLEM 2
Infectious disease
Prophylactic measure
Prophylactic measure

## 2018-12-04 NOTE — PROGRESS NOTE ADULT - REASON FOR ADMISSION
Aplastic Anemia

## 2018-12-04 NOTE — PROGRESS NOTE ADULT - SUBJECTIVE AND OBJECTIVE BOX
Diagnosis: Aplastic Anemia    Protocol/Chemo Regimen: Rabbit ATG, CSA, Prednisone    Day: 9    Pt endorsed: no acute complaints, feeling well    Review of Systems: Denies N/V/D, CP, SOB, HA.     Pain scale: Denies     Diet: Regular    Allergies    No Known Allergies    Intolerances        ANTIMICROBIALS  acyclovir   Oral Tab/Cap 400 milliGRAM(s) Oral every 12 hours  fluconAZOLE   Tablet 100 milliGRAM(s) Oral daily  levoFLOXacin  Tablet 250 milliGRAM(s) Oral every 24 hours  trimethoprim  160 mG/sulfamethoxazole 800 mG 1 Tablet(s) Oral <User Schedule>      HEME/ONC MEDICATIONS      STANDING MEDICATIONS  artificial  tears Solution 1 Drop(s) Both EYES every 6 hours  atorvastatin 10 milliGRAM(s) Oral at bedtime  carvedilol 25 milliGRAM(s) Oral every 12 hours  cycloSPORINE  (SandIMMUNE)  Solution 100 milliGRAM(s) Oral every 12 hours  docusate sodium 200 milliGRAM(s) Oral daily  EPINEPHrine     1 mG/mL Injectable 0.3 milliGRAM(s) IntraMuscular once  furosemide    Tablet 40 milliGRAM(s) Oral daily  loratadine 10 milliGRAM(s) Oral daily  losartan 25 milliGRAM(s) Oral every 12 hours  melatonin 10 milliGRAM(s) Oral at bedtime  predniSONE   Tablet 100 milliGRAM(s) Oral every 24 hours  sodium chloride 0.9%. 1000 milliLiter(s) IV Continuous <Continuous>      PRN MEDICATIONS  acetaminophen   Tablet .. 650 milliGRAM(s) Oral every 6 hours PRN        Vital Signs Last 24 Hrs  T(C): 35.6 (04 Dec 2018 05:05), Max: 36.7 (03 Dec 2018 17:16)  T(F): 96 (04 Dec 2018 05:05), Max: 98.1 (03 Dec 2018 17:16)  HR: 78 (04 Dec 2018 05:05) (75 - 94)  BP: 135/78 (04 Dec 2018 05:05) (113/67 - 138/60)  BP(mean): --  RR: 18 (04 Dec 2018 05:05) (18 - 20)  SpO2: 99% (04 Dec 2018 05:05) (98% - 99%)    PHYSICAL EXAM  General: NAD  HEENT: PERRLA, EOMI   Neck: supple  CV: (+) S1/S2 RRR  Lungs: clear to auscultation, no wheezes or rales  Abdomen: soft, non-tender, non-distended (+) BS  Ext: no clubbing, cyanosis or edema  Skin: no rashes and no petechiae  Neuro: alert and oriented X 3, no focal deficits  Central Line: PIV C/D/I Diagnosis: Aplastic Anemia    Protocol/Chemo Regimen: Rabbit ATG, CSA, Prednisone    Day: 9    Pt endorsed: no acute complaints, feeling well    Review of Systems: Denies N/V/D, CP, SOB, HA.     Pain scale: Denies     Diet: Regular    Allergies    No Known Allergies    Intolerances        ANTIMICROBIALS  acyclovir   Oral Tab/Cap 400 milliGRAM(s) Oral every 12 hours  fluconAZOLE   Tablet 100 milliGRAM(s) Oral daily  levoFLOXacin  Tablet 250 milliGRAM(s) Oral every 24 hours  trimethoprim  160 mG/sulfamethoxazole 800 mG 1 Tablet(s) Oral <User Schedule>      HEME/ONC MEDICATIONS      STANDING MEDICATIONS  artificial  tears Solution 1 Drop(s) Both EYES every 6 hours  atorvastatin 10 milliGRAM(s) Oral at bedtime  carvedilol 25 milliGRAM(s) Oral every 12 hours  cycloSPORINE  (SandIMMUNE)  Solution 100 milliGRAM(s) Oral every 12 hours  docusate sodium 200 milliGRAM(s) Oral daily  EPINEPHrine     1 mG/mL Injectable 0.3 milliGRAM(s) IntraMuscular once  furosemide    Tablet 40 milliGRAM(s) Oral daily  loratadine 10 milliGRAM(s) Oral daily  losartan 25 milliGRAM(s) Oral every 12 hours  melatonin 10 milliGRAM(s) Oral at bedtime  predniSONE   Tablet 100 milliGRAM(s) Oral every 24 hours  sodium chloride 0.9%. 1000 milliLiter(s) IV Continuous <Continuous>      PRN MEDICATIONS  acetaminophen   Tablet .. 650 milliGRAM(s) Oral every 6 hours PRN        Vital Signs Last 24 Hrs  T(C): 35.6 (04 Dec 2018 05:05), Max: 36.7 (03 Dec 2018 17:16)  T(F): 96 (04 Dec 2018 05:05), Max: 98.1 (03 Dec 2018 17:16)  HR: 78 (04 Dec 2018 05:05) (75 - 94)  BP: 135/78 (04 Dec 2018 05:05) (113/67 - 138/60)  BP(mean): --  RR: 18 (04 Dec 2018 05:05) (18 - 20)  SpO2: 99% (04 Dec 2018 05:05) (98% - 99%)    PHYSICAL EXAM  General: NAD  HEENT: PERRLA, EOMI   CV: (+) S1/S2 RRR  Lungs: clear to auscultation, no wheezes or rales  Abdomen: soft, non-tender, non-distended (+) BS  Ext: no edema  Skin: no rashes and no petechiae  Neuro: alert and oriented X 3, no focal deficits  Central Line: PICC

## 2018-12-04 NOTE — PROGRESS NOTE ADULT - PROBLEM SELECTOR PROBLEM 1
Aplastic anemia

## 2018-12-04 NOTE — PROGRESS NOTE ADULT - ASSESSMENT
This is an 84 yo M with PMHx of CAD status post PCI with GREYSON x 4 in 2008, cardiomyopathy (EF 20-25%) with ICD placement in 2014, SDH with evacuation after mechanical fall in 2014 treated at [facility], chronic Afib (no coumadin 2/2 thrombocytopenia) and Aplastic Anemia with PNH subpopulation originally diagnosed in 2013 treated with Horse ATG/CSA/Prednisone now admitted for relapsed disease and treatment with Rabbit ATG/CSA/Prednisone. The patients latest PNH screen is normal and last BM bx completed on 1018/18 shows decreased megakaryocytes. Patient has pancytopenia secondary to ATG and disease condition.            .

## 2018-12-04 NOTE — PROGRESS NOTE ADULT - PROBLEM SELECTOR PROBLEM 3
Acute renal insufficiency
Infectious disease
Infectious disease

## 2018-12-04 NOTE — PROGRESS NOTE ADULT - PROBLEM SELECTOR PLAN 5
Prior stenting  Last known EF 25% in 2016  Continue Lasix 40mg PO daily with KCL supplementation  Continue Coreg 25mg BID and Cozaar.   Afib rate controlled.

## 2018-12-05 ENCOUNTER — APPOINTMENT (OUTPATIENT)
Dept: HEMATOLOGY ONCOLOGY | Facility: CLINIC | Age: 83
End: 2018-12-05

## 2018-12-05 ENCOUNTER — OUTPATIENT (OUTPATIENT)
Dept: OUTPATIENT SERVICES | Facility: HOSPITAL | Age: 83
LOS: 1 days | End: 2018-12-05

## 2018-12-05 DIAGNOSIS — Z95.810 PRESENCE OF AUTOMATIC (IMPLANTABLE) CARDIAC DEFIBRILLATOR: Chronic | ICD-10-CM

## 2018-12-05 DIAGNOSIS — Z98.890 OTHER SPECIFIED POSTPROCEDURAL STATES: Chronic | ICD-10-CM

## 2018-12-05 DIAGNOSIS — D59.5: ICD-10-CM

## 2018-12-07 ENCOUNTER — APPOINTMENT (OUTPATIENT)
Dept: HEMATOLOGY ONCOLOGY | Facility: CLINIC | Age: 83
End: 2018-12-07
Payer: MEDICARE

## 2018-12-07 ENCOUNTER — APPOINTMENT (OUTPATIENT)
Dept: INFUSION THERAPY | Facility: HOSPITAL | Age: 83
End: 2018-12-07

## 2018-12-07 ENCOUNTER — APPOINTMENT (OUTPATIENT)
Dept: HEMATOLOGY ONCOLOGY | Facility: CLINIC | Age: 83
End: 2018-12-07

## 2018-12-07 ENCOUNTER — INBOUND DOCUMENT (OUTPATIENT)
Age: 83
End: 2018-12-07

## 2018-12-07 ENCOUNTER — RESULT REVIEW (OUTPATIENT)
Age: 83
End: 2018-12-07

## 2018-12-07 LAB
BASOPHILS # BLD AUTO: 0 K/UL — SIGNIFICANT CHANGE UP (ref 0–0.2)
BASOPHILS NFR BLD AUTO: 0.3 % — SIGNIFICANT CHANGE UP (ref 0–2)
EOSINOPHIL # BLD AUTO: 0.1 K/UL — SIGNIFICANT CHANGE UP (ref 0–0.5)
EOSINOPHIL NFR BLD AUTO: 3 % — SIGNIFICANT CHANGE UP (ref 0–6)
HCT VFR BLD CALC: 30.9 % — LOW (ref 39–50)
HGB BLD-MCNC: 10.7 G/DL — LOW (ref 13–17)
LYMPHOCYTES # BLD AUTO: 0.1 K/UL — LOW (ref 1–3.3)
LYMPHOCYTES # BLD AUTO: 3.9 % — LOW (ref 13–44)
MCHC RBC-ENTMCNC: 34.6 G/DL — SIGNIFICANT CHANGE UP (ref 32–36)
MCHC RBC-ENTMCNC: 34.6 PG — HIGH (ref 27–34)
MCV RBC AUTO: 99.8 FL — SIGNIFICANT CHANGE UP (ref 80–100)
MONOCYTES # BLD AUTO: 0.2 K/UL — SIGNIFICANT CHANGE UP (ref 0–0.9)
MONOCYTES NFR BLD AUTO: 7.6 % — SIGNIFICANT CHANGE UP (ref 2–14)
NEUTROPHILS # BLD AUTO: 1.8 K/UL — SIGNIFICANT CHANGE UP (ref 1.8–7.4)
NEUTROPHILS NFR BLD AUTO: 85.2 % — HIGH (ref 43–77)
PLATELET # BLD AUTO: 19 K/UL — CRITICAL LOW (ref 150–400)
RBC # BLD: 3.1 M/UL — LOW (ref 4.2–5.8)
RBC # FLD: 15.3 % — HIGH (ref 10.3–14.5)
WBC # BLD: 2.2 K/UL — LOW (ref 3.8–10.5)
WBC # FLD AUTO: 2.2 K/UL — LOW (ref 3.8–10.5)

## 2018-12-07 PROCEDURE — 99214 OFFICE O/P EST MOD 30 MIN: CPT

## 2018-12-07 RX ORDER — PRAVASTATIN SODIUM 20 MG/1
20 TABLET ORAL DAILY
Refills: 0 | Status: DISCONTINUED | COMMUNITY
Start: 2018-04-17 | End: 2018-12-07

## 2018-12-11 ENCOUNTER — RESULT REVIEW (OUTPATIENT)
Age: 83
End: 2018-12-11

## 2018-12-11 ENCOUNTER — APPOINTMENT (OUTPATIENT)
Dept: INFUSION THERAPY | Facility: HOSPITAL | Age: 83
End: 2018-12-11

## 2018-12-11 ENCOUNTER — APPOINTMENT (OUTPATIENT)
Dept: HEMATOLOGY ONCOLOGY | Facility: CLINIC | Age: 83
End: 2018-12-11
Payer: MEDICARE

## 2018-12-11 VITALS
SYSTOLIC BLOOD PRESSURE: 125 MMHG | RESPIRATION RATE: 17 BRPM | WEIGHT: 242.29 LBS | OXYGEN SATURATION: 100 % | TEMPERATURE: 97.5 F | BODY MASS INDEX: 30.28 KG/M2 | HEART RATE: 84 BPM | DIASTOLIC BLOOD PRESSURE: 81 MMHG

## 2018-12-11 LAB
ANISOCYTOSIS BLD QL: SLIGHT — SIGNIFICANT CHANGE UP
BASOPHILS NFR BLD AUTO: 1 % — SIGNIFICANT CHANGE UP (ref 0–2)
EOSINOPHIL # BLD AUTO: 0.1 K/UL — SIGNIFICANT CHANGE UP (ref 0–0.5)
EOSINOPHIL NFR BLD AUTO: 1 % — SIGNIFICANT CHANGE UP (ref 0–6)
HCT VFR BLD CALC: 30.8 % — LOW (ref 39–50)
HGB BLD-MCNC: 10.6 G/DL — LOW (ref 13–17)
LYMPHOCYTES # BLD AUTO: 0.1 K/UL — LOW (ref 1–3.3)
LYMPHOCYTES # BLD AUTO: 3 % — LOW (ref 13–44)
MACROCYTES BLD QL: SLIGHT — SIGNIFICANT CHANGE UP
MCHC RBC-ENTMCNC: 34.4 G/DL — SIGNIFICANT CHANGE UP (ref 32–36)
MCHC RBC-ENTMCNC: 35 PG — HIGH (ref 27–34)
MCV RBC AUTO: 102 FL — HIGH (ref 80–100)
MICROCYTES BLD QL: SLIGHT — SIGNIFICANT CHANGE UP
MONOCYTES # BLD AUTO: 0.1 K/UL — SIGNIFICANT CHANGE UP (ref 0–0.9)
MONOCYTES NFR BLD AUTO: 5 % — SIGNIFICANT CHANGE UP (ref 2–14)
NEUTROPHILS # BLD AUTO: 1.1 K/UL — LOW (ref 1.8–7.4)
NEUTROPHILS NFR BLD AUTO: 88 % — HIGH (ref 43–77)
NEUTS BAND # BLD: 2 % — SIGNIFICANT CHANGE UP (ref 0–8)
PLAT MORPH BLD: NORMAL — SIGNIFICANT CHANGE UP
PLATELET # BLD AUTO: 23 K/UL — LOW (ref 150–400)
POIKILOCYTOSIS BLD QL AUTO: SLIGHT — SIGNIFICANT CHANGE UP
RBC # BLD: 3.03 M/UL — LOW (ref 4.2–5.8)
RBC # FLD: 15.5 % — HIGH (ref 10.3–14.5)
RBC BLD AUTO: SIGNIFICANT CHANGE UP
WBC # BLD: 1.3 K/UL — LOW (ref 3.8–10.5)
WBC # FLD AUTO: 1.3 K/UL — LOW (ref 3.8–10.5)

## 2018-12-11 PROCEDURE — 99214 OFFICE O/P EST MOD 30 MIN: CPT

## 2018-12-11 RX ORDER — LEVOFLOXACIN 250 MG/1
250 TABLET, FILM COATED ORAL DAILY
Refills: 0 | Status: DISCONTINUED | COMMUNITY
Start: 2018-12-07 | End: 2018-12-11

## 2018-12-11 NOTE — HISTORY OF PRESENT ILLNESS
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 C/o severe foot edema which also causes pain, right greater than left. Reports back pain whenever he lifts his leg. States he urinates about every hour. Was discharged from hospital 7 days ago. Some bruising. No other complaints. Epistaxis resolved.  No HAs, fever, bleeding, jaundice, hematuria, dark urine, visual problems, chest pain, SOB, abdominal pain, melena, BRBPR. Weight has been stable.

## 2018-12-11 NOTE — ADDENDUM
[FreeTextEntry1] : Documented by Pilo Neely acting as a scribe for Dr. Jad Yeager on 12/11/18\par \par All medical record entries made by the Scribe were at my, Dr. Jad Yeager's, direction and personally dictated by me on 12/11/18. I have reviewed the chart and agree that the record accurately reflects my personal performance of the history, physical exam, procedure and imaging

## 2018-12-11 NOTE — ASSESSMENT
[Palliative Care Plan] : not applicable at this time [FreeTextEntry1] : 82 YO M with aplastic anemia plus a PNH subpopulation. Last marrow suggestive of dyspoietic changes. 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 Dr. Tomas is observing this expectantly. His PNH screen is now normal. Following normalization of his platelets with Promacta, he became markedly thrombocytopenic. Marrow shows decreased megakaryocytes. 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 We opted to treat wit rabbit ATG, Sandimmune and prednisone in attempt to regain remission. \par \par Plan:\par Hold ASA\par Sandimmune 100 mg bid\par Acyclovir/Diflucan/ Bactrim DS\par Lasix\par RTC in 2 days\par \par

## 2018-12-11 NOTE — PHYSICAL EXAM
[Restricted in physically strenuous activity but ambulatory and able to carry out work of a light or sedentary nature] : Status 1- Restricted in physically strenuous activity but ambulatory and able to carry out work of a light or sedentary nature, e.g., light house work, office work [Normal] : pharynx is unremarkable, moist mucus membrane, no oral lesions [de-identified] : AICD left anterior chest wall. RRR. S1S2 normal. No murmurs, gallops. [de-identified] : PICC RUE. 4+ edema to knees bilaterally, R>LLE. [de-identified] : Senile purpura on arms with brawny changes.

## 2018-12-14 ENCOUNTER — APPOINTMENT (OUTPATIENT)
Dept: HEMATOLOGY ONCOLOGY | Facility: CLINIC | Age: 83
End: 2018-12-14

## 2018-12-15 ENCOUNTER — APPOINTMENT (OUTPATIENT)
Dept: INFUSION THERAPY | Facility: HOSPITAL | Age: 83
End: 2018-12-15

## 2018-12-15 ENCOUNTER — RESULT REVIEW (OUTPATIENT)
Age: 83
End: 2018-12-15

## 2018-12-15 LAB
BASOPHILS # BLD AUTO: 0 K/UL — SIGNIFICANT CHANGE UP (ref 0–0.2)
EOSINOPHIL # BLD AUTO: 0.1 K/UL — SIGNIFICANT CHANGE UP (ref 0–0.5)
EOSINOPHIL NFR BLD AUTO: 2 % — SIGNIFICANT CHANGE UP (ref 0–6)
HCT VFR BLD CALC: 29 % — LOW (ref 39–50)
HGB BLD-MCNC: 10 G/DL — LOW (ref 13–17)
LYMPHOCYTES # BLD AUTO: 0.1 K/UL — LOW (ref 1–3.3)
LYMPHOCYTES # BLD AUTO: 10 % — LOW (ref 13–44)
MACROCYTES BLD QL: SLIGHT — SIGNIFICANT CHANGE UP
MCHC RBC-ENTMCNC: 34.6 G/DL — SIGNIFICANT CHANGE UP (ref 32–36)
MCHC RBC-ENTMCNC: 35 PG — HIGH (ref 27–34)
MCV RBC AUTO: 101 FL — HIGH (ref 80–100)
METAMYELOCYTES # FLD: 2 % — HIGH (ref 0–0)
MONOCYTES # BLD AUTO: 0.1 K/UL — SIGNIFICANT CHANGE UP (ref 0–0.9)
MONOCYTES NFR BLD AUTO: 11 % — SIGNIFICANT CHANGE UP (ref 2–14)
NEUTROPHILS # BLD AUTO: 0.7 K/UL — LOW (ref 1.8–7.4)
NEUTROPHILS NFR BLD AUTO: 74 % — SIGNIFICANT CHANGE UP (ref 43–77)
NEUTS BAND # BLD: 1 % — SIGNIFICANT CHANGE UP (ref 0–8)
PLAT MORPH BLD: NORMAL — SIGNIFICANT CHANGE UP
PLATELET # BLD AUTO: 28 K/UL — LOW (ref 150–400)
RBC # BLD: 2.87 M/UL — LOW (ref 4.2–5.8)
RBC # FLD: 15.8 % — HIGH (ref 10.3–14.5)
RBC BLD AUTO: SIGNIFICANT CHANGE UP
WBC # BLD: 0.9 K/UL — CRITICAL LOW (ref 3.8–10.5)
WBC # FLD AUTO: 0.9 K/UL — CRITICAL LOW (ref 3.8–10.5)

## 2018-12-19 ENCOUNTER — RESULT REVIEW (OUTPATIENT)
Age: 83
End: 2018-12-19

## 2018-12-19 ENCOUNTER — APPOINTMENT (OUTPATIENT)
Dept: HEMATOLOGY ONCOLOGY | Facility: CLINIC | Age: 83
End: 2018-12-19
Payer: MEDICARE

## 2018-12-19 ENCOUNTER — APPOINTMENT (OUTPATIENT)
Dept: INFUSION THERAPY | Facility: HOSPITAL | Age: 83
End: 2018-12-19

## 2018-12-19 LAB
BASOPHILS # BLD AUTO: 0 K/UL — SIGNIFICANT CHANGE UP (ref 0–0.2)
EOSINOPHIL # BLD AUTO: 0.1 K/UL — SIGNIFICANT CHANGE UP (ref 0–0.5)
EOSINOPHIL NFR BLD AUTO: 8 % — HIGH (ref 0–6)
HCT VFR BLD CALC: 30.2 % — LOW (ref 39–50)
HGB BLD-MCNC: 10.5 G/DL — LOW (ref 13–17)
LYMPHOCYTES # BLD AUTO: 0.1 K/UL — LOW (ref 1–3.3)
LYMPHOCYTES # BLD AUTO: 8 % — LOW (ref 13–44)
MACROCYTES BLD QL: SLIGHT — SIGNIFICANT CHANGE UP
MCHC RBC-ENTMCNC: 34.7 G/DL — SIGNIFICANT CHANGE UP (ref 32–36)
MCHC RBC-ENTMCNC: 35.1 PG — HIGH (ref 27–34)
MCV RBC AUTO: 101 FL — HIGH (ref 80–100)
MONOCYTES # BLD AUTO: 0.2 K/UL — SIGNIFICANT CHANGE UP (ref 0–0.9)
MONOCYTES NFR BLD AUTO: 18 % — HIGH (ref 2–14)
NEUTROPHILS # BLD AUTO: 0.5 K/UL — LOW (ref 1.8–7.4)
NEUTROPHILS NFR BLD AUTO: 66 % — SIGNIFICANT CHANGE UP (ref 43–77)
NRBC # BLD: 1 /100 — HIGH (ref 0–0)
PLAT MORPH BLD: NORMAL — SIGNIFICANT CHANGE UP
PLATELET # BLD AUTO: 35 K/UL — LOW (ref 150–400)
RBC # BLD: 2.99 M/UL — LOW (ref 4.2–5.8)
RBC # FLD: 15.7 % — HIGH (ref 10.3–14.5)
RBC BLD AUTO: SIGNIFICANT CHANGE UP
WBC # BLD: 0.8 K/UL — CRITICAL LOW (ref 3.8–10.5)
WBC # FLD AUTO: 0.8 K/UL — CRITICAL LOW (ref 3.8–10.5)

## 2018-12-19 PROCEDURE — 99214 OFFICE O/P EST MOD 30 MIN: CPT

## 2018-12-19 NOTE — REVIEW OF SYSTEMS
[Lower Ext Edema] : lower extremity edema [Joint Pain] : joint pain [Negative] : Allergic/Immunologic

## 2018-12-22 ENCOUNTER — APPOINTMENT (OUTPATIENT)
Dept: INFUSION THERAPY | Facility: HOSPITAL | Age: 83
End: 2018-12-22

## 2018-12-22 ENCOUNTER — RESULT REVIEW (OUTPATIENT)
Age: 83
End: 2018-12-22

## 2018-12-22 LAB
ANISOCYTOSIS BLD QL: SLIGHT — SIGNIFICANT CHANGE UP
BASOPHILS # BLD AUTO: 0 K/UL — SIGNIFICANT CHANGE UP (ref 0–0.2)
EOSINOPHIL # BLD AUTO: SIGNIFICANT CHANGE UP (ref 0–0.5)
HCT VFR BLD CALC: 30.2 % — LOW (ref 39–50)
HGB BLD-MCNC: 11.2 G/DL — LOW (ref 13–17)
HYPOCHROMIA BLD QL: SLIGHT — SIGNIFICANT CHANGE UP
LYMPHOCYTES # BLD AUTO: 0.1 K/UL — LOW (ref 1–3.3)
LYMPHOCYTES # BLD AUTO: 8 % — LOW (ref 13–44)
MACROCYTES BLD QL: SLIGHT — SIGNIFICANT CHANGE UP
MCHC RBC-ENTMCNC: 37.1 G/DL — HIGH (ref 32–36)
MCHC RBC-ENTMCNC: 37.8 PG — HIGH (ref 27–34)
MCV RBC AUTO: 102 FL — HIGH (ref 80–100)
MICROCYTES BLD QL: SLIGHT — SIGNIFICANT CHANGE UP
MONOCYTES # BLD AUTO: 0.2 K/UL — SIGNIFICANT CHANGE UP (ref 0–0.9)
MONOCYTES NFR BLD AUTO: 19 % — HIGH (ref 2–14)
NEUTROPHILS # BLD AUTO: SIGNIFICANT CHANGE UP (ref 1.8–7.4)
NEUTROPHILS NFR BLD AUTO: 73 % — SIGNIFICANT CHANGE UP (ref 43–77)
PLAT MORPH BLD: NORMAL — SIGNIFICANT CHANGE UP
PLATELET # BLD AUTO: 34 K/UL — LOW (ref 150–400)
POIKILOCYTOSIS BLD QL AUTO: SLIGHT — SIGNIFICANT CHANGE UP
POLYCHROMASIA BLD QL SMEAR: SLIGHT — SIGNIFICANT CHANGE UP
RBC # BLD: 2.97 M/UL — LOW (ref 4.2–5.8)
RBC # FLD: 15.8 % — HIGH (ref 10.3–14.5)
RBC BLD AUTO: SIGNIFICANT CHANGE UP
WBC # BLD: 0.9 K/UL — CRITICAL LOW (ref 3.8–10.5)
WBC # FLD AUTO: 0.9 K/UL — CRITICAL LOW (ref 3.8–10.5)

## 2018-12-26 ENCOUNTER — RESULT REVIEW (OUTPATIENT)
Age: 83
End: 2018-12-26

## 2018-12-26 ENCOUNTER — APPOINTMENT (OUTPATIENT)
Dept: HEMATOLOGY ONCOLOGY | Facility: CLINIC | Age: 83
End: 2018-12-26
Payer: MEDICARE

## 2018-12-26 ENCOUNTER — APPOINTMENT (OUTPATIENT)
Dept: INFUSION THERAPY | Facility: HOSPITAL | Age: 83
End: 2018-12-26

## 2018-12-26 LAB
BASOPHILS # BLD AUTO: 0 K/UL — SIGNIFICANT CHANGE UP (ref 0–0.2)
BASOPHILS NFR BLD AUTO: 2 % — SIGNIFICANT CHANGE UP (ref 0–2)
EOSINOPHIL # BLD AUTO: 0.1 K/UL — SIGNIFICANT CHANGE UP (ref 0–0.5)
EOSINOPHIL NFR BLD AUTO: 1 % — SIGNIFICANT CHANGE UP (ref 0–6)
HCT VFR BLD CALC: 31 % — LOW (ref 39–50)
HGB BLD-MCNC: 10.8 G/DL — LOW (ref 13–17)
LYMPHOCYTES # BLD AUTO: 0.3 K/UL — LOW (ref 1–3.3)
LYMPHOCYTES # BLD AUTO: 36 % — SIGNIFICANT CHANGE UP (ref 13–44)
MCHC RBC-ENTMCNC: 34.8 G/DL — SIGNIFICANT CHANGE UP (ref 32–36)
MCHC RBC-ENTMCNC: 35.5 PG — HIGH (ref 27–34)
MCV RBC AUTO: 102 FL — HIGH (ref 80–100)
MONOCYTES # BLD AUTO: 0.4 K/UL — SIGNIFICANT CHANGE UP (ref 0–0.9)
MONOCYTES NFR BLD AUTO: 29 % — HIGH (ref 2–14)
NEUTROPHILS # BLD AUTO: 0.5 K/UL — LOW (ref 1.8–7.4)
NEUTROPHILS NFR BLD AUTO: 32 % — LOW (ref 43–77)
PLAT MORPH BLD: NORMAL — SIGNIFICANT CHANGE UP
PLATELET # BLD AUTO: 40 K/UL — LOW (ref 150–400)
RBC # BLD: 3.04 M/UL — LOW (ref 4.2–5.8)
RBC # FLD: 16.2 % — HIGH (ref 10.3–14.5)
RBC BLD AUTO: SIGNIFICANT CHANGE UP
WBC # BLD: 1.2 K/UL — LOW (ref 3.8–10.5)
WBC # FLD AUTO: 1.2 K/UL — LOW (ref 3.8–10.5)

## 2018-12-26 PROCEDURE — 99214 OFFICE O/P EST MOD 30 MIN: CPT

## 2018-12-26 NOTE — REVIEW OF SYSTEMS
[Joint Pain] : joint pain [Negative] : Allergic/Immunologic [Fever] : no fever [Chills] : no chills [Night Sweats] : no night sweats [Lower Ext Edema] : no lower extremity edema

## 2018-12-26 NOTE — RESULTS/DATA
[FreeTextEntry1] : WBC 1,200 , Hgb 10.8, Hct 31.0, .0, Plts 40,000, Diff  32P, 36L, 29M, 1Eo, 2Ba, \par \par 12/19/18\par CMP, BUN/CR  60/2.19,  \par \par Mg 1.8\par cyclosporin 328

## 2018-12-26 NOTE — HISTORY OF PRESENT ILLNESS
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 Lower back pain is better. Takes Tylenol prn.   He continues taking 20 mg  torsemide, and  Lasix is on hold. He has a follow up on 1/3/18 with cardiologist.   Some bruising. No other complaints.   No HAs, fever, bleeding, jaundice, hematuria, dark urine, visual problems, chest pain, SOB, abdominal pain, melena, BRBPR. Lost 15 pounds since discharged from hospital, his ankles are better,  less edema.

## 2018-12-26 NOTE — ASSESSMENT
[Palliative Care Plan] : not applicable at this time [FreeTextEntry1] : 84 YO M with aplastic anemia plus a PNH subpopulation. Last marrow suggestive of dyspoietic changes. 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 Dr. Tomas is observing this expectantly. His PNH screen is now normal. Following normalization of his platelets with Promacta, he became markedly thrombocytopenic. Marrow shows decreased megakaryocytes. 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 We opted to treat wit rabbit ATG, Sandimmune and prednisone in attempt to regain remission. Due to increased BUN/CR number, will cut the cyclosporin to half and will monitor counts closely.  today, pt will continue taking  Cipro. CBC results seems to be improving.  \par \par Plan:\par Hold ASA\par Continue Cipro 500 mg bid \par CMP, LDH, MG, Cyclosporin \par Sandimmune 50  mg bid\par Acyclovir/Diflucan/ Bactrim DS, refills provided to [facility]\par ER if febrile, temp  >100.4\par Torsemide, switch to Lasix as per cardiology\par RTC 1 week\par \par

## 2018-12-27 ENCOUNTER — OUTPATIENT (OUTPATIENT)
Dept: OUTPATIENT SERVICES | Facility: HOSPITAL | Age: 83
LOS: 1 days | Discharge: HOME | End: 2018-12-27

## 2018-12-27 DIAGNOSIS — D61.9 APLASTIC ANEMIA, UNSPECIFIED: ICD-10-CM

## 2018-12-27 DIAGNOSIS — Z98.890 OTHER SPECIFIED POSTPROCEDURAL STATES: Chronic | ICD-10-CM

## 2018-12-27 DIAGNOSIS — Z95.810 PRESENCE OF AUTOMATIC (IMPLANTABLE) CARDIAC DEFIBRILLATOR: Chronic | ICD-10-CM

## 2018-12-27 DIAGNOSIS — D59.5: ICD-10-CM

## 2018-12-27 LAB
ALBUMIN SERPL ELPH-MCNC: 3.3 G/DL
ALP BLD-CCNC: 111 U/L
ALT SERPL-CCNC: 24 U/L
ANION GAP SERPL CALC-SCNC: 8 MMOL/L
AST SERPL-CCNC: 19 U/L
BILIRUB SERPL-MCNC: 1.2 MG/DL
BUN SERPL-MCNC: 31 MG/DL
CALCIUM SERPL-MCNC: 8.8 MG/DL
CHLORIDE SERPL-SCNC: 105 MMOL/L
CO2 SERPL-SCNC: 26 MMOL/L
CREAT SERPL-MCNC: 1.2 MG/DL
GLUCOSE SERPL-MCNC: 115 MG/DL
LDH SERPL-CCNC: 231 U/L
MAGNESIUM SERPL-MCNC: 1.8 MG/DL
POTASSIUM SERPL-SCNC: 5.2 MMOL/L
PROT SERPL-MCNC: 6 G/DL
SODIUM SERPL-SCNC: 140 MMOL/L

## 2018-12-27 NOTE — ASSESSMENT
[Palliative Care Plan] : not applicable at this time [FreeTextEntry1] : 82 YO M with aplastic anemia plus a PNH subpopulation. Last marrow suggestive of dyspoietic changes. 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 Dr. Tomas is observing this expectantly. His PNH screen is now normal. Following normalization of his platelets with Promacta, he became markedly thrombocytopenic. Marrow shows decreased megakaryocytes. 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 We opted to treat wit rabbit ATG, Sandimmune and prednisone in attempt to regain remission. H/H, plts  remains stable, no transfusion needed. Prophylactic Cipro to start today.  \par \par Plan:\par Hold ASA\par Cipro 500 mg bid \par CMP, LDH, MG, Cyclosporin \par Sandimmune 100 mg bid\par Acyclovir/Diflucan/ Bactrim DS\par ER if febrile, temp  >100.4\par Torsemide, switch to Lasix as per cardiology\par Possible plts, cbc check Saturday\par RTC 1 week\par \par

## 2018-12-27 NOTE — HISTORY OF PRESENT ILLNESS
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 C/O lower back pain started this morning. Denies trauma.  His cardiologist started him on 40 mg torsemide for 1 week, then to 20 mg x 1 week, to reduce his ankle edema. Meanwhile he was told to hold Lasix for those two weeks.  Some bruising. No other complaints.   No HAs, fever, bleeding, jaundice, hematuria, dark urine, visual problems, chest pain, SOB, abdominal pain, melena, BRBPR. Weight has been stable.

## 2018-12-27 NOTE — RESULTS/DATA
[FreeTextEntry1] : WBC 0,800 , Hgb 10.5, Hct 30.2, .0, Plts 35,000, Diff pending \par \par 12/11/18\par CMP, BUN 31, Glu 115, \par \par Mg 1.8

## 2018-12-27 NOTE — HISTORY OF PRESENT ILLNESS
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  Pt was admitted to [facility] from 11/26- 12/4. He was treated for relapsed Aplastic anemia with Rabbit ATG/CSA/prednisone. Pt developed ROLANDA   which was monitored closely. Had episode of headache while admitted which CT head was negative for acute pathology. S/P  double lumen PICC  line placement.  C/o bloody nose when blowing his nose since last night.  Wife used ice and pressure which helped. Appetite is well.  C/o bilateral ankle edema, not worsening.  Some bruising. No other complaints. No  fever,  jaundice, hematuria, dark urine, visual problems, chest pain,N/V/D,  SOB, abdominal pain, melena, BRBPR. \par \par \par \par

## 2018-12-27 NOTE — REVIEW OF SYSTEMS
[Joint Pain] : joint pain [Negative] : Allergic/Immunologic [Nosebleeds] : nosebleeds [Lower Ext Edema] : lower extremity edema [Fever] : no fever [Night Sweats] : no night sweats [Fatigue] : no fatigue [FreeTextEntry4] : only when blows his nose hard  since last night [FreeTextEntry5] : b/l ankles

## 2018-12-27 NOTE — ASSESSMENT
[Palliative Care Plan] : not applicable at this time [FreeTextEntry1] : 82 YO M with aplastic anemia plus a PNH subpopulation. Last marrow suggestive of dyspoietic changes. 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 Dr. Tomas is observing this expectantly. His PNH screen is now normal. Following normalization of his platelets with Promacta, the platelet count is now slowly dropping and he has become markedly thrombocytopenic. Marrow shows decreased megakaryocytes. Treated with Rabbit ATG / CSA/ Prednisone for  relapsed Aplastic anemia. S/P PICC line placement. Counts stable today. will monitor closely.   \par \par Plan:\par Hold ASA\par D/C Levaquin \par Continue with Fluconazole, Bactrim  M-W-F, Acyclovir\par Continue Cyclosporin 100 mg bid\par To ER if temp 100.4, or bleeding  \par RTC Tuesday \par \par

## 2019-01-02 ENCOUNTER — RESULT REVIEW (OUTPATIENT)
Age: 84
End: 2019-01-02

## 2019-01-02 ENCOUNTER — APPOINTMENT (OUTPATIENT)
Dept: HEMATOLOGY ONCOLOGY | Facility: CLINIC | Age: 84
End: 2019-01-02
Payer: MEDICARE

## 2019-01-02 LAB
ALBUMIN SERPL ELPH-MCNC: 4 G/DL
ALBUMIN SERPL ELPH-MCNC: 4.1 G/DL
ALP BLD-CCNC: 101 U/L
ALP BLD-CCNC: 101 U/L
ALT SERPL-CCNC: 15 U/L
ALT SERPL-CCNC: 16 U/L
ANION GAP SERPL CALC-SCNC: 11 MMOL/L
ANION GAP SERPL CALC-SCNC: 12 MMOL/L
ANISOCYTOSIS BLD QL: SLIGHT — SIGNIFICANT CHANGE UP
AST SERPL-CCNC: 24 U/L
AST SERPL-CCNC: 25 U/L
BASOPHILS NFR BLD AUTO: 1 % — SIGNIFICANT CHANGE UP (ref 0–2)
BILIRUB SERPL-MCNC: 0.6 MG/DL
BILIRUB SERPL-MCNC: 0.9 MG/DL
BUN SERPL-MCNC: 60 MG/DL
BUN SERPL-MCNC: 68 MG/DL
CALCIUM SERPL-MCNC: 9 MG/DL
CALCIUM SERPL-MCNC: 9.1 MG/DL
CHLORIDE SERPL-SCNC: 100 MMOL/L
CHLORIDE SERPL-SCNC: 103 MMOL/L
CO2 SERPL-SCNC: 26 MMOL/L
CO2 SERPL-SCNC: 28 MMOL/L
CREAT SERPL-MCNC: 2.19 MG/DL
CREAT SERPL-MCNC: 2.27 MG/DL
EOSINOPHIL NFR BLD AUTO: 1 % — SIGNIFICANT CHANGE UP (ref 0–6)
GLUCOSE SERPL-MCNC: 109 MG/DL
GLUCOSE SERPL-MCNC: 113 MG/DL
HCT VFR BLD CALC: 32.6 % — LOW (ref 39–50)
HGB BLD-MCNC: 11.6 G/DL — LOW (ref 13–17)
LYMPHOCYTES # BLD AUTO: 36 % — SIGNIFICANT CHANGE UP (ref 13–44)
MACROCYTES BLD QL: SLIGHT — SIGNIFICANT CHANGE UP
MCHC RBC-ENTMCNC: 35.5 G/DL — SIGNIFICANT CHANGE UP (ref 32–36)
MCHC RBC-ENTMCNC: 36.1 PG — HIGH (ref 27–34)
MCV RBC AUTO: 102 FL — HIGH (ref 80–100)
MICROCYTES BLD QL: SLIGHT — SIGNIFICANT CHANGE UP
MONOCYTES NFR BLD AUTO: 9 % — SIGNIFICANT CHANGE UP (ref 2–14)
NEUTROPHILS # BLD AUTO: 1.2 K/UL — LOW (ref 1.8–7.4)
NEUTROPHILS NFR BLD AUTO: 53 % — SIGNIFICANT CHANGE UP (ref 43–77)
PLAT MORPH BLD: NORMAL — SIGNIFICANT CHANGE UP
PLATELET # BLD AUTO: 44 K/UL — LOW (ref 150–400)
POIKILOCYTOSIS BLD QL AUTO: SLIGHT — SIGNIFICANT CHANGE UP
POTASSIUM SERPL-SCNC: 5 MMOL/L
POTASSIUM SERPL-SCNC: 5 MMOL/L
PROT SERPL-MCNC: 6.5 G/DL
PROT SERPL-MCNC: 6.5 G/DL
RBC # BLD: 3.21 M/UL — LOW (ref 4.2–5.8)
RBC # FLD: 15.9 % — HIGH (ref 10.3–14.5)
RBC BLD AUTO: SIGNIFICANT CHANGE UP
SODIUM SERPL-SCNC: 140 MMOL/L
SODIUM SERPL-SCNC: 141 MMOL/L
WBC # BLD: 2.2 K/UL — LOW (ref 3.8–10.5)
WBC # FLD AUTO: 2.2 K/UL — LOW (ref 3.8–10.5)

## 2019-01-02 PROCEDURE — 99214 OFFICE O/P EST MOD 30 MIN: CPT

## 2019-01-03 LAB
ALBUMIN SERPL ELPH-MCNC: 4 G/DL
ALP BLD-CCNC: 97 U/L
ALT SERPL-CCNC: 21 U/L
ANION GAP SERPL CALC-SCNC: 13 MMOL/L
AST SERPL-CCNC: 27 U/L
BILIRUB SERPL-MCNC: 0.5 MG/DL
BUN SERPL-MCNC: 65 MG/DL
CALCIUM SERPL-MCNC: 9.1 MG/DL
CHLORIDE SERPL-SCNC: 103 MMOL/L
CO2 SERPL-SCNC: 25 MMOL/L
CREAT SERPL-MCNC: 2.02 MG/DL
CYCLOSPORINE SER-MCNC: 328 NG/ML
CYCLOSPORINE SER-MCNC: 41 NG/ML
CYCLOSPORINE SER-MCNC: 79 NG/ML
GLUCOSE SERPL-MCNC: 123 MG/DL
LDH SERPL-CCNC: 221 U/L
LDH SERPL-CCNC: 230 U/L
LDH SERPL-CCNC: 317 U/L
MAGNESIUM SERPL-MCNC: 2.3 MG/DL
NT-PROBNP SERPL-MCNC: 3871 PG/ML
POTASSIUM SERPL-SCNC: 4.9 MMOL/L
PROT SERPL-MCNC: 6.5 G/DL
SODIUM SERPL-SCNC: 141 MMOL/L

## 2019-01-09 ENCOUNTER — APPOINTMENT (OUTPATIENT)
Dept: HEMATOLOGY ONCOLOGY | Facility: CLINIC | Age: 84
End: 2019-01-09
Payer: MEDICARE

## 2019-01-09 ENCOUNTER — RESULT REVIEW (OUTPATIENT)
Age: 84
End: 2019-01-09

## 2019-01-09 LAB
BASOPHILS # BLD AUTO: 0 K/UL — SIGNIFICANT CHANGE UP (ref 0–0.2)
BASOPHILS NFR BLD AUTO: 0 % — SIGNIFICANT CHANGE UP (ref 0–2)
EOSINOPHIL # BLD AUTO: 0 K/UL — SIGNIFICANT CHANGE UP (ref 0–0.5)
EOSINOPHIL NFR BLD AUTO: 1.6 % — SIGNIFICANT CHANGE UP (ref 0–6)
HCT VFR BLD CALC: 33 % — LOW (ref 39–50)
HGB BLD-MCNC: 11.7 G/DL — LOW (ref 13–17)
LYMPHOCYTES # BLD AUTO: 0.4 K/UL — LOW (ref 1–3.3)
LYMPHOCYTES # BLD AUTO: 19.2 % — SIGNIFICANT CHANGE UP (ref 13–44)
MCHC RBC-ENTMCNC: 35.4 G/DL — SIGNIFICANT CHANGE UP (ref 32–36)
MCHC RBC-ENTMCNC: 36.6 PG — HIGH (ref 27–34)
MCV RBC AUTO: 103 FL — HIGH (ref 80–100)
MONOCYTES # BLD AUTO: 0.3 K/UL — SIGNIFICANT CHANGE UP (ref 0–0.9)
MONOCYTES NFR BLD AUTO: 15.3 % — HIGH (ref 2–14)
NEUTROPHILS # BLD AUTO: 1.2 K/UL — LOW (ref 1.8–7.4)
NEUTROPHILS NFR BLD AUTO: 63.8 % — SIGNIFICANT CHANGE UP (ref 43–77)
PLATELET # BLD AUTO: 46 K/UL — LOW (ref 150–400)
RBC # BLD: 3.19 M/UL — LOW (ref 4.2–5.8)
RBC # FLD: 15.2 % — HIGH (ref 10.3–14.5)
WBC # BLD: 2 K/UL — LOW (ref 3.8–10.5)
WBC # FLD AUTO: 2 K/UL — LOW (ref 3.8–10.5)

## 2019-01-09 PROCEDURE — 99214 OFFICE O/P EST MOD 30 MIN: CPT

## 2019-01-09 RX ORDER — CIPROFLOXACIN HYDROCHLORIDE 500 MG/1
500 TABLET, FILM COATED ORAL TWICE DAILY
Qty: 60 | Refills: 1 | Status: DISCONTINUED | COMMUNITY
Start: 2018-12-19 | End: 2019-01-09

## 2019-01-09 NOTE — REVIEW OF SYSTEMS
[Fatigue] : fatigue [Joint Pain] : joint pain [Negative] : Allergic/Immunologic [Fever] : no fever [Chills] : no chills [Night Sweats] : no night sweats [Lower Ext Edema] : no lower extremity edema [FreeTextEntry5] : better [FreeTextEntry9] : hand pain with flexion

## 2019-01-09 NOTE — HISTORY OF PRESENT ILLNESS
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  C/O mild pain bilateral  hands  when flexing for few weeks. Denies injury. Has + ROM, and full sensation. No swelling. Continues with   torsemide 20 mg  qd. Has a f/u with cardiologist 1/20.  His lower ext edema is improved. Weight stable. Watching his weight, lost 2 more pounds.  He is on 50 mg bid of cyclosporin. Tolerating it well.  Some fatigue. No other complaints. Has PICC dressing change weekly by visiting nurse.   No HAs, fever, bleeding, jaundice, hematuria, dark urine, visual problems, chest pain, SOB, abdominal pain, melena, BRBPR.

## 2019-01-09 NOTE — RESULTS/DATA
[FreeTextEntry1] : WBC 2,000 , Hgb 11.7, Hct 33.0, .0, Plts 46,000, Diff   64P, 19L, 15M, 2Eo, ANC 1.2\par \par 1/2/19\par CMP, BUN/CR  65/ 2.02,  Glu 123\par \par Mg 2.3\par cyclosporin 41

## 2019-01-09 NOTE — ASSESSMENT
[Palliative Care Plan] : not applicable at this time [FreeTextEntry1] : 84 YO M with aplastic anemia plus a PNH subpopulation. Last marrow suggestive of dyspoietic changes. 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 Dr. Tomas is observing this expectantly. His PNH screen is now normal. Following normalization of his platelets with Promacta, he became markedly thrombocytopenic. Marrow shows decreased megakaryocytes. 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 We opted to treat wit rabbit ATG, Sandimmune and prednisone in attempt to regain remission. Continue  cyclosporin 50 mg bid,  Kidney function improving slightly.  CBC counts remains stable.  \par \par Plan:\par Hold ASA\par CMP, LDH, MG, Cyclosporin \par Continue Sandimmune 50  mg bid\par Xanax 0.5 mg prn script send to pharmacy, I-Stop done \par Acyclovir/Diflucan/ Bactrim DS, refills provided to [facility]\par ER if febrile, temp  >100.4\par F/U with cardiology\par RTC 1 week\par \par

## 2019-01-15 ENCOUNTER — APPOINTMENT (OUTPATIENT)
Dept: HEMATOLOGY ONCOLOGY | Facility: CLINIC | Age: 84
End: 2019-01-15
Payer: MEDICARE

## 2019-01-15 ENCOUNTER — RESULT REVIEW (OUTPATIENT)
Age: 84
End: 2019-01-15

## 2019-01-15 VITALS
BODY MASS INDEX: 27.91 KG/M2 | SYSTOLIC BLOOD PRESSURE: 118 MMHG | OXYGEN SATURATION: 98 % | HEART RATE: 88 BPM | RESPIRATION RATE: 16 BRPM | WEIGHT: 223.33 LBS | DIASTOLIC BLOOD PRESSURE: 71 MMHG | TEMPERATURE: 98.5 F

## 2019-01-15 LAB
BASOPHILS # BLD AUTO: 0 K/UL — SIGNIFICANT CHANGE UP (ref 0–0.2)
BASOPHILS NFR BLD AUTO: 1.1 % — SIGNIFICANT CHANGE UP (ref 0–2)
EOSINOPHIL # BLD AUTO: 0 K/UL — SIGNIFICANT CHANGE UP (ref 0–0.5)
EOSINOPHIL NFR BLD AUTO: 1.2 % — SIGNIFICANT CHANGE UP (ref 0–6)
HCT VFR BLD CALC: 35.4 % — LOW (ref 39–50)
HGB BLD-MCNC: 12.1 G/DL — LOW (ref 13–17)
LYMPHOCYTES # BLD AUTO: 0.3 K/UL — LOW (ref 1–3.3)
LYMPHOCYTES # BLD AUTO: 13.7 % — SIGNIFICANT CHANGE UP (ref 13–44)
MCHC RBC-ENTMCNC: 34.1 G/DL — SIGNIFICANT CHANGE UP (ref 32–36)
MCHC RBC-ENTMCNC: 35.2 PG — HIGH (ref 27–34)
MCV RBC AUTO: 103 FL — HIGH (ref 80–100)
MONOCYTES # BLD AUTO: 0.3 K/UL — SIGNIFICANT CHANGE UP (ref 0–0.9)
MONOCYTES NFR BLD AUTO: 14.8 % — HIGH (ref 2–14)
NEUTROPHILS # BLD AUTO: 1.6 K/UL — LOW (ref 1.8–7.4)
NEUTROPHILS NFR BLD AUTO: 69.2 % — SIGNIFICANT CHANGE UP (ref 43–77)
PLATELET # BLD AUTO: 78 K/UL — LOW (ref 150–400)
RBC # BLD: 3.42 M/UL — LOW (ref 4.2–5.8)
RBC # FLD: 14.4 % — SIGNIFICANT CHANGE UP (ref 10.3–14.5)
WBC # BLD: 2.3 K/UL — LOW (ref 3.8–10.5)
WBC # FLD AUTO: 2.3 K/UL — LOW (ref 3.8–10.5)

## 2019-01-15 PROCEDURE — 99214 OFFICE O/P EST MOD 30 MIN: CPT

## 2019-01-15 NOTE — HISTORY OF PRESENT ILLNESS
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 Still with PICC line in his right arm, he wishes to have it removed. Notes chronic stable neck pain  No new HAs, fever, bleeding, jaundice, hematuria, dark urine, visual problems, chest pain, SOB, abdominal pain, melena, BRBPR. Weight has been stable.Diuresed off 15 lbs. Reports intermittent  pain, numbness and swelling in his hands.

## 2019-01-15 NOTE — ASSESSMENT
[Palliative Care Plan] : not applicable at this time [FreeTextEntry1] : 84 YO M with aplastic anemia plus a PNH subpopulation. Last marrow suggestive of dyspoietic changes. 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 Dr. Tomas is observing this expectantly. His PNH screen is now normal. Following normalization of his platelets with Promacta, he became markedly thrombocytopenic. Marrow showed decreased megakaryocytes. 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 We opted to treat wit rabbit ATG, Sandimmune and prednisone in attempt to regain remission.  He appears to be responding.\par \par Plan:\par Hold ASA\par Sandimmune 50 mg bid\par Acyclovir/Diflucan/ Bactrim DS\par Remove PICC\par RTC in 2 weeks \par \par

## 2019-01-15 NOTE — PHYSICAL EXAM
[Restricted in physically strenuous activity but ambulatory and able to carry out work of a light or sedentary nature] : Status 1- Restricted in physically strenuous activity but ambulatory and able to carry out work of a light or sedentary nature, e.g., light house work, office work [Normal] : affect appropriate [de-identified] : AICD left anterior chest wall. RRR. S1S2 normal. No murmurs, gallops. [de-identified] : [facility] MARY [de-identified] : Senile purpura on arms with brawny changes.

## 2019-01-15 NOTE — ADDENDUM
[FreeTextEntry1] : Documented by Pilo Neely acting as a scribe for Dr. Jad Yeager on 1/15/19\par \par All medical record entries made by the Scribe were at my, Dr. Jad Yeager's, direction and personally dictated by me on 1/15/19. I have reviewed the chart and agree that the record accurately reflects my personal performance of the history, physical exam, results, assessment and plan. I have also personally directed, reviewed, and agree with the discharge instructions.

## 2019-01-15 NOTE — RESULTS/DATA
[FreeTextEntry1] : WBC 2,300, Hgb 12.1, Hct 36.4, .0 , Plts 78,000, Diff 69 P, 14 L, 15 M, 1 Eo, 1 Ba, ANC  1600\par \par 1/9/19\par CMP: glucose 131, BUN 61, creatinine 1.99, eGFR 31\par Magnesium: 2.4\par LDH: 206\par Cyclosporine: 47\par

## 2019-01-22 ENCOUNTER — OUTPATIENT (OUTPATIENT)
Dept: OUTPATIENT SERVICES | Facility: HOSPITAL | Age: 84
LOS: 1 days | Discharge: HOME | End: 2019-01-22

## 2019-01-22 DIAGNOSIS — Z98.890 OTHER SPECIFIED POSTPROCEDURAL STATES: Chronic | ICD-10-CM

## 2019-01-22 DIAGNOSIS — D59.5: ICD-10-CM

## 2019-01-22 DIAGNOSIS — D61.9 APLASTIC ANEMIA, UNSPECIFIED: ICD-10-CM

## 2019-01-22 DIAGNOSIS — Z95.810 PRESENCE OF AUTOMATIC (IMPLANTABLE) CARDIAC DEFIBRILLATOR: Chronic | ICD-10-CM

## 2019-01-29 ENCOUNTER — RESULT REVIEW (OUTPATIENT)
Age: 84
End: 2019-01-29

## 2019-01-29 ENCOUNTER — APPOINTMENT (OUTPATIENT)
Dept: HEMATOLOGY ONCOLOGY | Facility: CLINIC | Age: 84
End: 2019-01-29
Payer: MEDICARE

## 2019-01-29 LAB
ALBUMIN SERPL ELPH-MCNC: 4 G/DL
ALBUMIN SERPL ELPH-MCNC: 4.2 G/DL
ALP BLD-CCNC: 103 U/L
ALP BLD-CCNC: 111 U/L
ALT SERPL-CCNC: 24 U/L
ALT SERPL-CCNC: 27 U/L
ANION GAP SERPL CALC-SCNC: 11 MMOL/L
ANION GAP SERPL CALC-SCNC: 11 MMOL/L
AST SERPL-CCNC: 26 U/L
AST SERPL-CCNC: 29 U/L
BASOPHILS # BLD AUTO: 0 K/UL — SIGNIFICANT CHANGE UP (ref 0–0.2)
BASOPHILS NFR BLD AUTO: 0.5 % — SIGNIFICANT CHANGE UP (ref 0–2)
BILIRUB SERPL-MCNC: 0.4 MG/DL
BILIRUB SERPL-MCNC: 0.6 MG/DL
BUN SERPL-MCNC: 61 MG/DL
BUN SERPL-MCNC: 69 MG/DL
CALCIUM SERPL-MCNC: 9.3 MG/DL
CALCIUM SERPL-MCNC: 9.6 MG/DL
CHLORIDE SERPL-SCNC: 103 MMOL/L
CHLORIDE SERPL-SCNC: 107 MMOL/L
CO2 SERPL-SCNC: 24 MMOL/L
CO2 SERPL-SCNC: 27 MMOL/L
CREAT SERPL-MCNC: 1.79 MG/DL
CREAT SERPL-MCNC: 1.99 MG/DL
CYCLOSPORINE SER-MCNC: 38 NG/ML
CYCLOSPORINE SER-MCNC: 47 NG/ML
EOSINOPHIL # BLD AUTO: 0.1 K/UL — SIGNIFICANT CHANGE UP (ref 0–0.5)
EOSINOPHIL NFR BLD AUTO: 1.9 % — SIGNIFICANT CHANGE UP (ref 0–6)
GLUCOSE SERPL-MCNC: 131 MG/DL
GLUCOSE SERPL-MCNC: 149 MG/DL
HCT VFR BLD CALC: 37.9 % — LOW (ref 39–50)
HGB BLD-MCNC: 12.7 G/DL — LOW (ref 13–17)
LDH SERPL-CCNC: 206 U/L
LYMPHOCYTES # BLD AUTO: 0.3 K/UL — LOW (ref 1–3.3)
LYMPHOCYTES # BLD AUTO: 12.2 % — LOW (ref 13–44)
MAGNESIUM SERPL-MCNC: 2.4 MG/DL
MAGNESIUM SERPL-MCNC: 2.4 MG/DL
MCHC RBC-ENTMCNC: 33.6 G/DL — SIGNIFICANT CHANGE UP (ref 32–36)
MCHC RBC-ENTMCNC: 34.9 PG — HIGH (ref 27–34)
MCV RBC AUTO: 104 FL — HIGH (ref 80–100)
MONOCYTES # BLD AUTO: 0.4 K/UL — SIGNIFICANT CHANGE UP (ref 0–0.9)
MONOCYTES NFR BLD AUTO: 13.4 % — SIGNIFICANT CHANGE UP (ref 2–14)
NEUTROPHILS # BLD AUTO: 2 K/UL — SIGNIFICANT CHANGE UP (ref 1.8–7.4)
NEUTROPHILS NFR BLD AUTO: 72 % — SIGNIFICANT CHANGE UP (ref 43–77)
PLATELET # BLD AUTO: 96 K/UL — LOW (ref 150–400)
POTASSIUM SERPL-SCNC: 5 MMOL/L
POTASSIUM SERPL-SCNC: 5.1 MMOL/L
PROT SERPL-MCNC: 6.6 G/DL
PROT SERPL-MCNC: 6.7 G/DL
RBC # BLD: 3.64 M/UL — LOW (ref 4.2–5.8)
RBC # FLD: 13.6 % — SIGNIFICANT CHANGE UP (ref 10.3–14.5)
SODIUM SERPL-SCNC: 141 MMOL/L
SODIUM SERPL-SCNC: 142 MMOL/L
WBC # BLD: 2.7 K/UL — LOW (ref 3.8–10.5)
WBC # FLD AUTO: 2.7 K/UL — LOW (ref 3.8–10.5)

## 2019-01-29 PROCEDURE — 99214 OFFICE O/P EST MOD 30 MIN: CPT

## 2019-02-12 ENCOUNTER — RESULT REVIEW (OUTPATIENT)
Age: 84
End: 2019-02-12

## 2019-02-12 ENCOUNTER — APPOINTMENT (OUTPATIENT)
Dept: HEMATOLOGY ONCOLOGY | Facility: CLINIC | Age: 84
End: 2019-02-12
Payer: MEDICARE

## 2019-02-12 LAB
ALBUMIN SERPL ELPH-MCNC: 4.1 G/DL
ALP BLD-CCNC: 94 U/L
ALT SERPL-CCNC: 27 U/L
ANION GAP SERPL CALC-SCNC: 11 MMOL/L
AST SERPL-CCNC: 31 U/L
BASOPHILS # BLD AUTO: 0 K/UL — SIGNIFICANT CHANGE UP (ref 0–0.2)
BASOPHILS NFR BLD AUTO: 1.7 % — SIGNIFICANT CHANGE UP (ref 0–2)
BILIRUB SERPL-MCNC: 0.4 MG/DL
BUN SERPL-MCNC: 44 MG/DL
CALCIUM SERPL-MCNC: 9.1 MG/DL
CHLORIDE SERPL-SCNC: 105 MMOL/L
CO2 SERPL-SCNC: 27 MMOL/L
CREAT SERPL-MCNC: 1.68 MG/DL
CYCLOSPORINE SER-MCNC: 45 NG/ML
EOSINOPHIL # BLD AUTO: 0.1 K/UL — SIGNIFICANT CHANGE UP (ref 0–0.5)
EOSINOPHIL NFR BLD AUTO: 1.8 % — SIGNIFICANT CHANGE UP (ref 0–6)
GLUCOSE SERPL-MCNC: 125 MG/DL
HCT VFR BLD CALC: 39.7 % — SIGNIFICANT CHANGE UP (ref 39–50)
HGB BLD-MCNC: 13.6 G/DL — SIGNIFICANT CHANGE UP (ref 13–17)
LDH SERPL-CCNC: 206 U/L
LDH SERPL-CCNC: 214 U/L
LYMPHOCYTES # BLD AUTO: 0.4 K/UL — LOW (ref 1–3.3)
LYMPHOCYTES # BLD AUTO: 14.7 % — SIGNIFICANT CHANGE UP (ref 13–44)
MAGNESIUM SERPL-MCNC: 2.3 MG/DL
MCHC RBC-ENTMCNC: 34.4 G/DL — SIGNIFICANT CHANGE UP (ref 32–36)
MCHC RBC-ENTMCNC: 36 PG — HIGH (ref 27–34)
MCV RBC AUTO: 105 FL — HIGH (ref 80–100)
MONOCYTES # BLD AUTO: 0.4 K/UL — SIGNIFICANT CHANGE UP (ref 0–0.9)
MONOCYTES NFR BLD AUTO: 14.4 % — HIGH (ref 2–14)
NEUTROPHILS # BLD AUTO: 2 K/UL — SIGNIFICANT CHANGE UP (ref 1.8–7.4)
NEUTROPHILS NFR BLD AUTO: 67.4 % — SIGNIFICANT CHANGE UP (ref 43–77)
PLATELET # BLD AUTO: 115 K/UL — LOW (ref 150–400)
POTASSIUM SERPL-SCNC: 4.5 MMOL/L
PROT SERPL-MCNC: 6.6 G/DL
RBC # BLD: 3.79 M/UL — LOW (ref 4.2–5.8)
RBC # FLD: 12.7 % — SIGNIFICANT CHANGE UP (ref 10.3–14.5)
SODIUM SERPL-SCNC: 143 MMOL/L
WBC # BLD: 2.9 K/UL — LOW (ref 3.8–10.5)
WBC # FLD AUTO: 2.9 K/UL — LOW (ref 3.8–10.5)

## 2019-02-12 PROCEDURE — 99213 OFFICE O/P EST LOW 20 MIN: CPT

## 2019-02-13 ENCOUNTER — OUTPATIENT (OUTPATIENT)
Dept: OUTPATIENT SERVICES | Facility: HOSPITAL | Age: 84
LOS: 1 days | Discharge: HOME | End: 2019-02-13

## 2019-02-13 DIAGNOSIS — Z95.810 PRESENCE OF AUTOMATIC (IMPLANTABLE) CARDIAC DEFIBRILLATOR: Chronic | ICD-10-CM

## 2019-02-13 DIAGNOSIS — Z98.890 OTHER SPECIFIED POSTPROCEDURAL STATES: Chronic | ICD-10-CM

## 2019-02-13 DIAGNOSIS — D61.9 APLASTIC ANEMIA, UNSPECIFIED: ICD-10-CM

## 2019-02-13 DIAGNOSIS — D59.5: ICD-10-CM

## 2019-02-26 ENCOUNTER — APPOINTMENT (OUTPATIENT)
Dept: HEMATOLOGY ONCOLOGY | Facility: CLINIC | Age: 84
End: 2019-02-26
Payer: MEDICARE

## 2019-02-26 ENCOUNTER — RESULT REVIEW (OUTPATIENT)
Age: 84
End: 2019-02-26

## 2019-02-26 VITALS
HEART RATE: 62 BPM | RESPIRATION RATE: 16 BRPM | DIASTOLIC BLOOD PRESSURE: 82 MMHG | TEMPERATURE: 97.7 F | BODY MASS INDEX: 28.74 KG/M2 | SYSTOLIC BLOOD PRESSURE: 125 MMHG | WEIGHT: 229.94 LBS | OXYGEN SATURATION: 99 %

## 2019-02-26 LAB
BASOPHILS # BLD AUTO: 0 K/UL — SIGNIFICANT CHANGE UP (ref 0–0.2)
BASOPHILS NFR BLD AUTO: 1.2 % — SIGNIFICANT CHANGE UP (ref 0–2)
EOSINOPHIL # BLD AUTO: 0 K/UL — SIGNIFICANT CHANGE UP (ref 0–0.5)
EOSINOPHIL NFR BLD AUTO: 1.1 % — SIGNIFICANT CHANGE UP (ref 0–6)
HCT VFR BLD CALC: 42.2 % — SIGNIFICANT CHANGE UP (ref 39–50)
HGB BLD-MCNC: 13.9 G/DL — SIGNIFICANT CHANGE UP (ref 13–17)
LYMPHOCYTES # BLD AUTO: 0.7 K/UL — LOW (ref 1–3.3)
LYMPHOCYTES # BLD AUTO: 20 % — SIGNIFICANT CHANGE UP (ref 13–44)
MCHC RBC-ENTMCNC: 32.9 G/DL — SIGNIFICANT CHANGE UP (ref 32–36)
MCHC RBC-ENTMCNC: 33.7 PG — SIGNIFICANT CHANGE UP (ref 27–34)
MCV RBC AUTO: 103 FL — HIGH (ref 80–100)
MONOCYTES # BLD AUTO: 0.4 K/UL — SIGNIFICANT CHANGE UP (ref 0–0.9)
MONOCYTES NFR BLD AUTO: 11.5 % — SIGNIFICANT CHANGE UP (ref 2–14)
NEUTROPHILS # BLD AUTO: 2.2 K/UL — SIGNIFICANT CHANGE UP (ref 1.8–7.4)
NEUTROPHILS NFR BLD AUTO: 66.3 % — SIGNIFICANT CHANGE UP (ref 43–77)
PLATELET # BLD AUTO: 96 K/UL — LOW (ref 150–400)
RBC # BLD: 4.11 M/UL — LOW (ref 4.2–5.8)
RBC # FLD: 12.2 % — SIGNIFICANT CHANGE UP (ref 10.3–14.5)
WBC # BLD: 3.4 K/UL — LOW (ref 3.8–10.5)
WBC # FLD AUTO: 3.4 K/UL — LOW (ref 3.8–10.5)

## 2019-02-26 PROCEDURE — 99214 OFFICE O/P EST MOD 30 MIN: CPT

## 2019-02-26 NOTE — PHYSICAL EXAM
[Restricted in physically strenuous activity but ambulatory and able to carry out work of a light or sedentary nature] : Status 1- Restricted in physically strenuous activity but ambulatory and able to carry out work of a light or sedentary nature, e.g., light house work, office work

## 2019-03-06 ENCOUNTER — APPOINTMENT (OUTPATIENT)
Dept: HEMATOLOGY ONCOLOGY | Facility: CLINIC | Age: 84
End: 2019-03-06
Payer: MEDICARE

## 2019-03-06 ENCOUNTER — RESULT REVIEW (OUTPATIENT)
Age: 84
End: 2019-03-06

## 2019-03-06 LAB
BASOPHILS # BLD AUTO: 0 K/UL — SIGNIFICANT CHANGE UP (ref 0–0.2)
BASOPHILS NFR BLD AUTO: 1.2 % — SIGNIFICANT CHANGE UP (ref 0–2)
EOSINOPHIL # BLD AUTO: 0.1 K/UL — SIGNIFICANT CHANGE UP (ref 0–0.5)
EOSINOPHIL NFR BLD AUTO: 1.9 % — SIGNIFICANT CHANGE UP (ref 0–6)
HCT VFR BLD CALC: 42.9 % — SIGNIFICANT CHANGE UP (ref 39–50)
HGB BLD-MCNC: 14.6 G/DL — SIGNIFICANT CHANGE UP (ref 13–17)
LYMPHOCYTES # BLD AUTO: 0.5 K/UL — LOW (ref 1–3.3)
LYMPHOCYTES # BLD AUTO: 12.2 % — LOW (ref 13–44)
MCHC RBC-ENTMCNC: 34 G/DL — SIGNIFICANT CHANGE UP (ref 32–36)
MCHC RBC-ENTMCNC: 34.8 PG — HIGH (ref 27–34)
MCV RBC AUTO: 102 FL — HIGH (ref 80–100)
MONOCYTES # BLD AUTO: 0.5 K/UL — SIGNIFICANT CHANGE UP (ref 0–0.9)
MONOCYTES NFR BLD AUTO: 12.7 % — SIGNIFICANT CHANGE UP (ref 2–14)
NEUTROPHILS # BLD AUTO: 2.7 K/UL — SIGNIFICANT CHANGE UP (ref 1.8–7.4)
NEUTROPHILS NFR BLD AUTO: 72 % — SIGNIFICANT CHANGE UP (ref 43–77)
PLATELET # BLD AUTO: 111 K/UL — LOW (ref 150–400)
RBC # BLD: 4.19 M/UL — LOW (ref 4.2–5.8)
RBC # FLD: 11.9 % — SIGNIFICANT CHANGE UP (ref 10.3–14.5)
WBC # BLD: 3.8 K/UL — SIGNIFICANT CHANGE UP (ref 3.8–10.5)
WBC # FLD AUTO: 3.8 K/UL — SIGNIFICANT CHANGE UP (ref 3.8–10.5)

## 2019-03-06 PROCEDURE — 99214 OFFICE O/P EST MOD 30 MIN: CPT

## 2019-03-12 ENCOUNTER — OUTPATIENT (OUTPATIENT)
Dept: OUTPATIENT SERVICES | Facility: HOSPITAL | Age: 84
LOS: 1 days | Discharge: HOME | End: 2019-03-12

## 2019-03-12 DIAGNOSIS — D59.5: ICD-10-CM

## 2019-03-12 DIAGNOSIS — Z95.810 PRESENCE OF AUTOMATIC (IMPLANTABLE) CARDIAC DEFIBRILLATOR: Chronic | ICD-10-CM

## 2019-03-12 DIAGNOSIS — D61.9 APLASTIC ANEMIA, UNSPECIFIED: ICD-10-CM

## 2019-03-12 DIAGNOSIS — Z98.890 OTHER SPECIFIED POSTPROCEDURAL STATES: Chronic | ICD-10-CM

## 2019-03-14 NOTE — REVIEW OF SYSTEMS
[Joint Pain] : joint pain [Negative] : Allergic/Immunologic [Fever] : no fever [Chills] : no chills [Night Sweats] : no night sweats

## 2019-03-14 NOTE — HISTORY OF PRESENT ILLNESS
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 S/P  PICC removal 1/16/19. Cardiologist took him off Potassium and lower his Torsemide to 10 mg qd. He was told to watch his weight any increase he will take two pills a day. His next echo scheduled for 2 months. Has ingrown toe nail. Seeing podiatrist Monday. Has no other  complaints.   No new HAs, fever, bleeding, jaundice, hematuria, dark urine, visual problems, chest pain, SOB, abdominal pain, melena, BRBPR. Weight has been stable.  Reports intermittent  pain, numbness and swelling in his hands.

## 2019-03-14 NOTE — RESULTS/DATA
[FreeTextEntry1] : WBC 3,800, Hgb 14.6, Hct 42.9, .0 , Plts 11,000, Diff  72P, 12L, 13M, 2Eo, 1Ba,   ANC  2.700\par \par 2/26/19\par CMP: glucose 128, BUN 49, creatinine 1.92, eGFR 32\par Magnesium: 2.2\par LDH: 177\par Cyclosporine: 177\par

## 2019-03-14 NOTE — REVIEW OF SYSTEMS
[Joint Pain] : joint pain [Negative] : Allergic/Immunologic [SOB on Exertion] : no shortness of breath during exertion

## 2019-03-14 NOTE — RESULTS/DATA
[FreeTextEntry1] : WBC 2,900, Hgb 13.6, Hct 39.7, .0 , Plts 115,000, Diff  68 P, 15 L, 15 M, 2Eo,  ANC  2000\par \par 1/29/19\par CMP: glucose 125, BUN 44, creatinine 1.68, eGFR 38\par Magnesium: 2.3\par LDH: 206\par Cyclosporine: 45\par

## 2019-03-14 NOTE — RESULTS/DATA
[FreeTextEntry1] : WBC 3,400, Hgb 13.9, Hct 42.4, .0 , Plts 96,000, Diff  normal   ANC  2.200\par \par 2/12/19\par CMP: glucose 120, BUN 42, creatinine 1.67, eGFR 38\par Magnesium: 2.2\par LDH: 196\par Cyclosporine: 48\par

## 2019-03-14 NOTE — ASSESSMENT
[Palliative Care Plan] : not applicable at this time [FreeTextEntry1] : 84 YO M with aplastic anemia plus a PNH subpopulation. Last marrow suggestive of dyspoietic changes. 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 Dr. Tomas is observing this expectantly. His PNH screen is now normal. Following normalization of his platelets with Promacta, he became markedly thrombocytopenic. Marrow showed decreased megakaryocytes. 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 We opted to treat wit rabbit ATG, Sandimmune and prednisone in attempt to regain remission.  He appears to be responding. S/P PIC removal. Counts improving. \par \par \par Plan:\par Hold ASA\par Sandimmune 50 mg bid\par Acyclovir/Diflucan/ Bactrim DS\par CMP, LDH, Mg, Cyclosporin\par RTC in 2 weeks \par \par

## 2019-03-14 NOTE — RESULTS/DATA
[FreeTextEntry1] : WBC 2,700, Hgb 12.7, Hct 37.9, .0 , Plts 96,000, Diff  72 P, 12 L, 13 M, 2Eo, 1 Ba, ANC  2000\par \par 1/15/19\par CMP: glucose 149, BUN 69, creatinine 1.79, eGFR 35\par Magnesium: 2.4\par LDH: 214\par Cyclosporine: 38\par

## 2019-03-14 NOTE — HISTORY OF PRESENT ILLNESS
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  No new  complaints.  No new HAs, fever, bleeding, jaundice, hematuria, dark urine, visual problems, chest pain, SOB, abdominal pain, melena, BRBPR. Weight has been stable.  Tolerating the treatment well. Weight stable.

## 2019-03-14 NOTE — ASSESSMENT
[Palliative Care Plan] : not applicable at this time [FreeTextEntry1] : 84 YO M with aplastic anemia plus a PNH subpopulation. Last marrow suggestive of dyspoietic changes. 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 Dr. Tomas is observing this expectantly. His PNH screen is now normal. Following normalization of his platelets with Promacta, he became markedly thrombocytopenic. Marrow showed decreased megakaryocytes. 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 We opted to treat wit rabbit ATG, Sandimmune and prednisone in attempt to regain remission.  He appears to be responding.  Counts improving. BUN/Cr  increasing will watch it closely. \par \par \par Plan:\par Hold ASA\par Sandimmune 50 mg bid, renewed \par Increase water intake, increase BUN/CR\par Acyclovir/Diflucan/ Bactrim DS, prescription renewed \par CMP, LDH, Mg, Cyclosporin\par RTC 2 weeks\par \par

## 2019-03-14 NOTE — HISTORY OF PRESENT ILLNESS
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  No new  complaints.  No new HAs, fever, bleeding, jaundice, hematuria, dark urine, visual problems, chest pain, SOB, abdominal pain, melena, BRBPR. Weight has been stable.  The  pain, numbness and swelling in his hands improving.

## 2019-03-14 NOTE — HISTORY OF PRESENT ILLNESS
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  Has no complaints.  No new HAs, fever, bleeding, jaundice, hematuria, dark urine, visual problems, chest pain, SOB, abdominal pain, melena, BRBPR. Weight has been stable.  The  pain, numbness and swelling in his hands improving.

## 2019-03-14 NOTE — ASSESSMENT
[Palliative Care Plan] : not applicable at this time [FreeTextEntry1] : 82 YO M with aplastic anemia plus a PNH subpopulation. Last marrow suggestive of dyspoietic changes. 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 Dr. Tomas is observing this expectantly. His PNH screen is now normal. Following normalization of his platelets with Promacta, he became markedly thrombocytopenic. Marrow showed decreased megakaryocytes. 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 We opted to treat wit rabbit ATG, Sandimmune and prednisone in attempt to regain remission.  He appears to be responding.  Counts improving. BUN/Cr  remains stable.  \par \par \par Plan:\par Hold ASA\par Sandimmune 50 mg bid, renewed \par Acyclovir/Diflucan/ Bactrim DS, prescription renewed \par Xanax renewed, I-Stop done \par CMP, LDH, Mg, Cyclosporin\par RTC 1 month \par \par

## 2019-03-14 NOTE — ASSESSMENT
[Palliative Care Plan] : not applicable at this time [FreeTextEntry1] : 84 YO M with aplastic anemia plus a PNH subpopulation. Last marrow suggestive of dyspoietic changes. 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 Dr. Tomas is observing this expectantly. His PNH screen is now normal. Following normalization of his platelets with Promacta, he became markedly thrombocytopenic. Marrow showed decreased megakaryocytes. 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 We opted to treat wit rabbit ATG, Sandimmune and prednisone in attempt to regain remission.  He appears to be responding.  Counts improving. BUN/Cr is improving on the lower dose of Sandimmune. \par \par \par Plan:\par Hold ASA\par Sandimmune 50 mg bid\par Acyclovir/Diflucan/ Bactrim DS\par CMP, LDH, Mg, Cyclosporin\par RTC in 2 weeks \par \par

## 2019-03-20 ENCOUNTER — RESULT REVIEW (OUTPATIENT)
Age: 84
End: 2019-03-20

## 2019-03-20 ENCOUNTER — APPOINTMENT (OUTPATIENT)
Dept: HEMATOLOGY ONCOLOGY | Facility: CLINIC | Age: 84
End: 2019-03-20
Payer: MEDICARE

## 2019-03-20 LAB
BASOPHILS # BLD AUTO: 0 K/UL — SIGNIFICANT CHANGE UP (ref 0–0.2)
BASOPHILS NFR BLD AUTO: 1.5 % — SIGNIFICANT CHANGE UP (ref 0–2)
EOSINOPHIL # BLD AUTO: 0.1 K/UL — SIGNIFICANT CHANGE UP (ref 0–0.5)
EOSINOPHIL NFR BLD AUTO: 2 % — SIGNIFICANT CHANGE UP (ref 0–6)
HCT VFR BLD CALC: 41.6 % — SIGNIFICANT CHANGE UP (ref 39–50)
HGB BLD-MCNC: 14.4 G/DL — SIGNIFICANT CHANGE UP (ref 13–17)
LYMPHOCYTES # BLD AUTO: 0.4 K/UL — LOW (ref 1–3.3)
LYMPHOCYTES # BLD AUTO: 15.9 % — SIGNIFICANT CHANGE UP (ref 13–44)
MCHC RBC-ENTMCNC: 34.6 G/DL — SIGNIFICANT CHANGE UP (ref 32–36)
MCHC RBC-ENTMCNC: 34.8 PG — HIGH (ref 27–34)
MCV RBC AUTO: 101 FL — HIGH (ref 80–100)
MONOCYTES # BLD AUTO: 0.4 K/UL — SIGNIFICANT CHANGE UP (ref 0–0.9)
MONOCYTES NFR BLD AUTO: 14.3 % — HIGH (ref 2–14)
NEUTROPHILS # BLD AUTO: 1.7 K/UL — LOW (ref 1.8–7.4)
NEUTROPHILS NFR BLD AUTO: 66.4 % — SIGNIFICANT CHANGE UP (ref 43–77)
PLATELET # BLD AUTO: 96 K/UL — SIGNIFICANT CHANGE UP (ref 150–400)
RBC # BLD: 4.13 M/UL — LOW (ref 4.2–5.8)
RBC # FLD: 11.8 % — SIGNIFICANT CHANGE UP (ref 10.3–14.5)
WBC # BLD: 2.6 K/UL — LOW (ref 3.8–10.5)
WBC # FLD AUTO: 2.6 K/UL — LOW (ref 3.8–10.5)

## 2019-03-20 PROCEDURE — 99214 OFFICE O/P EST MOD 30 MIN: CPT

## 2019-03-21 NOTE — REVIEW OF SYSTEMS
[Joint Pain] : joint pain [Negative] : Heme/Lymph [Fever] : no fever [Chills] : no chills [Night Sweats] : no night sweats [Fatigue] : no fatigue [SOB on Exertion] : no shortness of breath during exertion

## 2019-03-21 NOTE — RESULTS/DATA
[FreeTextEntry1] : WBC 2,600, Hgb 14.4, Hct 34.8, .0 , Plts  96,000, Diff  66P, 16L, 14M, 2Eo, 2Ba,   ANC  1.700\par \par 3/6/19\par CMP: glucose 136, BUN 50, creatinine 1.66, eGFR 38\par Magnesium: 2.2\par LDH: 193\par Cyclosporine: 40\par

## 2019-03-21 NOTE — ASSESSMENT
[Palliative Care Plan] : not applicable at this time [FreeTextEntry1] : 84 YO M with aplastic anemia plus a PNH subpopulation. Last marrow suggestive of dyspoietic changes. 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 Dr. Tomas is observing this expectantly. His PNH screen is now normal. Following normalization of his platelets with Promacta, he became markedly thrombocytopenic. Marrow showed decreased megakaryocytes. S/P treatment  with  rabbit ATG, Sandimmune and prednisone for aplastic anemia.   He appears to be responding.  Counts improving. BUN/CR improving with increase water intake.\par \par \par Plan:\par Hold ASA\par Sandimmune 50 mg bid, renewed \par Increase water intake,\par Acyclovir/Diflucan/ Bactrim DS, prescription renewed \par CMP, LDH, Mg, Cyclosporin\par RTC 2 weeks\par \par

## 2019-03-26 ENCOUNTER — RESULT REVIEW (OUTPATIENT)
Age: 84
End: 2019-03-26

## 2019-03-26 ENCOUNTER — APPOINTMENT (OUTPATIENT)
Dept: HEMATOLOGY ONCOLOGY | Facility: CLINIC | Age: 84
End: 2019-03-26
Payer: MEDICARE

## 2019-03-26 VITALS
BODY MASS INDEX: 28.85 KG/M2 | SYSTOLIC BLOOD PRESSURE: 137 MMHG | TEMPERATURE: 97.9 F | WEIGHT: 230.82 LBS | DIASTOLIC BLOOD PRESSURE: 71 MMHG | HEART RATE: 97 BPM | RESPIRATION RATE: 16 BRPM | OXYGEN SATURATION: 99 %

## 2019-03-26 LAB
ALBUMIN SERPL ELPH-MCNC: 4 G/DL
ALBUMIN SERPL ELPH-MCNC: 4.1 G/DL
ALBUMIN SERPL ELPH-MCNC: 4.2 G/DL
ALBUMIN SERPL ELPH-MCNC: 4.2 G/DL
ALP BLD-CCNC: 75 U/L
ALP BLD-CCNC: 78 U/L
ALP BLD-CCNC: 87 U/L
ALP BLD-CCNC: 90 U/L
ALT SERPL-CCNC: 17 U/L
ALT SERPL-CCNC: 19 U/L
ALT SERPL-CCNC: 24 U/L
ALT SERPL-CCNC: 27 U/L
ANION GAP SERPL CALC-SCNC: 11 MMOL/L
ANION GAP SERPL CALC-SCNC: 12 MMOL/L
ANION GAP SERPL CALC-SCNC: 13 MMOL/L
ANION GAP SERPL CALC-SCNC: 14 MMOL/L
AST SERPL-CCNC: 23 U/L
AST SERPL-CCNC: 25 U/L
AST SERPL-CCNC: 29 U/L
AST SERPL-CCNC: 34 U/L
BASOPHILS # BLD AUTO: 0 K/UL — SIGNIFICANT CHANGE UP (ref 0–0.2)
BASOPHILS NFR BLD AUTO: 0 % — SIGNIFICANT CHANGE UP (ref 0–2)
BILIRUB SERPL-MCNC: 0.4 MG/DL
BILIRUB SERPL-MCNC: 0.4 MG/DL
BILIRUB SERPL-MCNC: 0.5 MG/DL
BILIRUB SERPL-MCNC: 0.5 MG/DL
BUN SERPL-MCNC: 42 MG/DL
BUN SERPL-MCNC: 49 MG/DL
BUN SERPL-MCNC: 49 MG/DL
BUN SERPL-MCNC: 50 MG/DL
CALCIUM SERPL-MCNC: 9.4 MG/DL
CALCIUM SERPL-MCNC: 9.5 MG/DL
CALCIUM SERPL-MCNC: 9.5 MG/DL
CALCIUM SERPL-MCNC: 9.6 MG/DL
CHLORIDE SERPL-SCNC: 102 MMOL/L
CHLORIDE SERPL-SCNC: 104 MMOL/L
CHLORIDE SERPL-SCNC: 106 MMOL/L
CHLORIDE SERPL-SCNC: 99 MMOL/L
CO2 SERPL-SCNC: 26 MMOL/L
CO2 SERPL-SCNC: 28 MMOL/L
CREAT SERPL-MCNC: 1.66 MG/DL
CREAT SERPL-MCNC: 1.67 MG/DL
CREAT SERPL-MCNC: 1.92 MG/DL
CREAT SERPL-MCNC: 1.96 MG/DL
CYCLOSPORINE SER-MCNC: 177 NG/ML
CYCLOSPORINE SER-MCNC: 40 NG/ML
CYCLOSPORINE SER-MCNC: 48 NG/ML
EOSINOPHIL # BLD AUTO: 0 K/UL — SIGNIFICANT CHANGE UP (ref 0–0.5)
EOSINOPHIL NFR BLD AUTO: 1 % — SIGNIFICANT CHANGE UP (ref 0–6)
GLUCOSE SERPL-MCNC: 120 MG/DL
GLUCOSE SERPL-MCNC: 128 MG/DL
GLUCOSE SERPL-MCNC: 136 MG/DL
GLUCOSE SERPL-MCNC: 136 MG/DL
HCT VFR BLD CALC: 42.9 % — SIGNIFICANT CHANGE UP (ref 39–50)
HGB BLD-MCNC: 14.7 G/DL — SIGNIFICANT CHANGE UP (ref 13–17)
LDH SERPL-CCNC: 177 U/L
LDH SERPL-CCNC: 188 U/L
LDH SERPL-CCNC: 193 U/L
LDH SERPL-CCNC: 196 U/L
LYMPHOCYTES # BLD AUTO: 0.6 K/UL — LOW (ref 1–3.3)
LYMPHOCYTES # BLD AUTO: 17 % — SIGNIFICANT CHANGE UP (ref 13–44)
MAGNESIUM SERPL-MCNC: 2.2 MG/DL
MCHC RBC-ENTMCNC: 34.3 G/DL — SIGNIFICANT CHANGE UP (ref 32–36)
MCHC RBC-ENTMCNC: 34.3 PG — HIGH (ref 27–34)
MCV RBC AUTO: 100 FL — SIGNIFICANT CHANGE UP (ref 80–100)
MONOCYTES # BLD AUTO: 0.5 K/UL — SIGNIFICANT CHANGE UP (ref 0–0.9)
MONOCYTES NFR BLD AUTO: 13.9 % — SIGNIFICANT CHANGE UP (ref 2–14)
NEUTROPHILS # BLD AUTO: 2.4 K/UL — SIGNIFICANT CHANGE UP (ref 1.8–7.4)
NEUTROPHILS NFR BLD AUTO: 68.1 % — SIGNIFICANT CHANGE UP (ref 43–77)
PLATELET # BLD AUTO: 99 K/UL — LOW (ref 150–400)
POTASSIUM SERPL-SCNC: 4.5 MMOL/L
POTASSIUM SERPL-SCNC: 4.6 MMOL/L
POTASSIUM SERPL-SCNC: 4.6 MMOL/L
POTASSIUM SERPL-SCNC: 4.7 MMOL/L
PROT SERPL-MCNC: 6.6 G/DL
PROT SERPL-MCNC: 6.7 G/DL
PROT SERPL-MCNC: 6.7 G/DL
PROT SERPL-MCNC: 6.9 G/DL
RBC # BLD: 4.29 M/UL — SIGNIFICANT CHANGE UP (ref 4.2–5.8)
RBC # FLD: 12 % — SIGNIFICANT CHANGE UP (ref 10.3–14.5)
SODIUM SERPL-SCNC: 141 MMOL/L
SODIUM SERPL-SCNC: 142 MMOL/L
SODIUM SERPL-SCNC: 142 MMOL/L
SODIUM SERPL-SCNC: 145 MMOL/L
WBC # BLD: 3.5 K/UL — LOW (ref 3.8–10.5)
WBC # FLD AUTO: 3.5 K/UL — LOW (ref 3.8–10.5)

## 2019-03-26 PROCEDURE — 99214 OFFICE O/P EST MOD 30 MIN: CPT

## 2019-03-26 RX ORDER — SULFAMETHOXAZOLE AND TRIMETHOPRIM 800; 160 MG/1; MG/1
800-160 TABLET ORAL DAILY
Qty: 30 | Refills: 0 | Status: DISCONTINUED | COMMUNITY
Start: 2018-12-07 | End: 2019-03-26

## 2019-03-26 NOTE — ASSESSMENT
[Palliative Care Plan] : not applicable at this time [FreeTextEntry1] : 82 YO M with aplastic anemia plus a PNH subpopulation. Last marrow suggestive of dyspoietic changes. 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 Dr. Tomas is observing this expectantly. His PNH screen is now normal. Following normalization of his platelets with Promacta, he became markedly thrombocytopenic. Marrow showed decreased megakaryocytes. 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 We opted to treat wit rabbit ATG, Sandimmune and prednisone in attempt to regain remission.  He appears to be responding.\par \par Plan:\par Sandimmune 50 mg bid\par Acyclovir/Diflucan\par Hold Bactrim\par Anticoagulation for PAF as per Cardiology. Platelet count is adequate.\par Cyclosporine, CMP, LDH\par 4-10 ml lavender and 1-7 ml red to [facility], PNH screen next visit \par RTC in 4 weeks

## 2019-03-26 NOTE — PHYSICAL EXAM
[Restricted in physically strenuous activity but ambulatory and able to carry out work of a light or sedentary nature] : Status 1- Restricted in physically strenuous activity but ambulatory and able to carry out work of a light or sedentary nature, e.g., light house work, office work [Normal] : affect appropriate [de-identified] : AICD left anterior chest wall. RRR. Irreg irreg.No murmurs, gallops. [de-identified] : Senile purpura on arms with brawny changes.

## 2019-03-26 NOTE — ADDENDUM
[FreeTextEntry1] : Documented by Pilo Neely acting as a scribe for Dr. Jad Yeager on 03/26/2019 \par \par All medical record entries made by the Scribe were at my, Dr. Jad Yeager's, direction and personally dictated by me on 03/26/2019 . I have reviewed the chart and agree that the record accurately reflects my personal performance of the history, physical exam, results, assessment and plan. I have also personally directed, reviewed, and agree with the discharge instructions.

## 2019-03-26 NOTE — HISTORY OF PRESENT ILLNESS
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 Occasional barometric headaches. Notes chronic neck pain persists. Saw his cardiologist yesterday and completed an echocardiogram. Still with intermittent  pain, numbness and swelling in his hands. No new fever, bleeding, jaundice, hematuria, dark urine, visual problems, chest pain, SOB, abdominal pain, melena, BRBPR. Weight has been stable. Bactrim held beginning 1 day ago for renal dysfunction.

## 2019-03-26 NOTE — RESULTS/DATA
[FreeTextEntry1] : WBC 3,500 , Hgb 14.7, Hct 42.9, Plts 99,000 , Diff normal\par \par 3/25/19\par Echocardiogram: There is a mildly dilated left ventricular size with severely reduced global systolic function. Grade III diastolic function noted. Estimated LVEF is 10-15%. Severe left atrial enlargement. ICD leads seen in RA/RV chambers. Moderate right atrial enlargement. The aortic root is moderately dilated. Mild to moderate eccentric aortic insufficiency. Moderate to severe tricuspid regurgitation. Moderate pulmonary hypertension. As compared to most recent exam, decrease in LVEF, previously LVEF was 20-25%\par \par 3/11/19\par CMP: glucose 136, BUN 49, creatinine 1.96, eGFR 31\par Cyclosporine: 51\par Magnesium: 2.2\par LDH: 188\par

## 2019-04-08 ENCOUNTER — MESSAGE (OUTPATIENT)
Age: 84
End: 2019-04-08

## 2019-04-23 ENCOUNTER — OUTPATIENT (OUTPATIENT)
Dept: OUTPATIENT SERVICES | Facility: HOSPITAL | Age: 84
LOS: 1 days | Discharge: HOME | End: 2019-04-23

## 2019-04-23 DIAGNOSIS — Z98.890 OTHER SPECIFIED POSTPROCEDURAL STATES: Chronic | ICD-10-CM

## 2019-04-23 DIAGNOSIS — D61.9 APLASTIC ANEMIA, UNSPECIFIED: ICD-10-CM

## 2019-04-23 DIAGNOSIS — Z95.810 PRESENCE OF AUTOMATIC (IMPLANTABLE) CARDIAC DEFIBRILLATOR: Chronic | ICD-10-CM

## 2019-04-23 DIAGNOSIS — D59.5: ICD-10-CM

## 2019-04-26 ENCOUNTER — RESULT REVIEW (OUTPATIENT)
Age: 84
End: 2019-04-26

## 2019-04-26 ENCOUNTER — APPOINTMENT (OUTPATIENT)
Dept: HEMATOLOGY ONCOLOGY | Facility: CLINIC | Age: 84
End: 2019-04-26
Payer: MEDICARE

## 2019-04-26 VITALS
HEART RATE: 96 BPM | RESPIRATION RATE: 16 BRPM | DIASTOLIC BLOOD PRESSURE: 81 MMHG | TEMPERATURE: 97.4 F | SYSTOLIC BLOOD PRESSURE: 128 MMHG | WEIGHT: 232.14 LBS | OXYGEN SATURATION: 99 % | BODY MASS INDEX: 29.02 KG/M2

## 2019-04-26 LAB
BASOPHILS # BLD AUTO: 0 K/UL — SIGNIFICANT CHANGE UP (ref 0–0.2)
BASOPHILS NFR BLD AUTO: 1.2 % — SIGNIFICANT CHANGE UP (ref 0–2)
EOSINOPHIL # BLD AUTO: 0.1 K/UL — SIGNIFICANT CHANGE UP (ref 0–0.5)
EOSINOPHIL NFR BLD AUTO: 2.9 % — SIGNIFICANT CHANGE UP (ref 0–6)
HCT VFR BLD CALC: 44.4 % — SIGNIFICANT CHANGE UP (ref 39–50)
HGB BLD-MCNC: 15.2 G/DL — SIGNIFICANT CHANGE UP (ref 13–17)
LYMPHOCYTES # BLD AUTO: 0.5 K/UL — LOW (ref 1–3.3)
LYMPHOCYTES # BLD AUTO: 15 % — SIGNIFICANT CHANGE UP (ref 13–44)
MCHC RBC-ENTMCNC: 33.6 PG — SIGNIFICANT CHANGE UP (ref 27–34)
MCHC RBC-ENTMCNC: 34.2 G/DL — SIGNIFICANT CHANGE UP (ref 32–36)
MCV RBC AUTO: 98.3 FL — SIGNIFICANT CHANGE UP (ref 80–100)
MONOCYTES # BLD AUTO: 0.5 K/UL — SIGNIFICANT CHANGE UP (ref 0–0.9)
MONOCYTES NFR BLD AUTO: 14.6 % — HIGH (ref 2–14)
NEUTROPHILS # BLD AUTO: 2.1 K/UL — SIGNIFICANT CHANGE UP (ref 1.8–7.4)
NEUTROPHILS NFR BLD AUTO: 66.3 % — SIGNIFICANT CHANGE UP (ref 43–77)
PLATELET # BLD AUTO: 82 K/UL — LOW (ref 150–400)
RBC # BLD: 4.52 M/UL — SIGNIFICANT CHANGE UP (ref 4.2–5.8)
RBC # FLD: 12.4 % — SIGNIFICANT CHANGE UP (ref 10.3–14.5)
WBC # BLD: 3.2 K/UL — LOW (ref 3.8–10.5)
WBC # FLD AUTO: 3.2 K/UL — LOW (ref 3.8–10.5)

## 2019-04-26 PROCEDURE — 99214 OFFICE O/P EST MOD 30 MIN: CPT

## 2019-04-26 NOTE — REASON FOR VISIT
[Follow-Up Visit] : a follow-up visit for [Spouse] : spouse [Blood Count Assessment] : blood count assessment

## 2019-04-26 NOTE — CONSULT LETTER
[Dear  ___] : Dear  [unfilled], [Courtesy Letter:] : I had the pleasure of seeing your patient, [unfilled], in my office today. [Please see my note below.] : Please see my note below. [Sincerely,] : Sincerely, [DrDouglas  ___] : Dr. ALMEIDA [DrDouglas ___] : Dr. ALMEIDA [FreeTextEntry3] : Jad Yeager MD [FreeTextEntry2] : Venkat Lopez MD

## 2019-04-26 NOTE — RESULTS/DATA
[FreeTextEntry1] : WBC 3,200, Hgb 15.2, Hct 44.4, MCV 98.3, Plts 82,000, Diff: 66.3 P,15 L,15 M, 3 Eos,1 Ba, ANC 2,100\par \par 03/26/19\par CMP: Glucose 122, BUN 41, Creatinine 1.71, Globulin 2.8, eGFR 37\par LDH: 205\par Magnesium: 2.2\par Cyclosporine: 35\par \par

## 2019-04-26 NOTE — ADDENDUM
[FreeTextEntry1] : Documented by Keaton Roach acting as a scribe for Dr. Jad Yeager on 04/26/2019 \par \par All medical record entries made by the Scribe were at my, Dr. Jad Yeager's, direction and personally dictated by me on 04/26/2019. I have reviewed the chart and agree that the record accurately reflects my personal performance of the history, physical exam, results, assessment and plan. I have also personally directed, reviewed, and agree with the discharge instructions.\par

## 2019-04-26 NOTE — HISTORY OF PRESENT ILLNESS
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 Occasional barometric headaches. Notes chronic neck pain persists. Still with intermittent pain, numbness and swelling in his hands. No other complaints. No new fever, bleeding, jaundice, hematuria, dark urine, visual problems, chest pain, SOB, abdominal pain, melena, BRBPR. Weight has been stable. Bactrim held for renal dysfunction. [FreeTextEntry1] : 7/2013 -- 6/2014 Sandimmune/Prednisone;  7/2015- 10/15 Sandimmune; 10/30/15 Eltrombopag; 12/18 Rabbit ATG/Sandimmune/prednisone

## 2019-04-26 NOTE — ASSESSMENT
[Palliative Care Plan] : not applicable at this time [FreeTextEntry1] : 84 YO M with aplastic anemia plus a PNH subpopulation. Last marrow suggestive of dyspoietic changes. 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 Dr. Tomas is observing this expectantly. His PNH screen is now normal. Following normalization of his platelets with Promacta, he became markedly thrombocytopenic. Marrow showed decreased megakaryocytes. 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 We opted to treat with rabbit ATG, Sandimmune and prednisone in attempt to regain remission.  He appears to be responding.\par \par Plan:\par Sandimmune 50 mg bid\par Acyclovir/Diflucan\par Hold Bactrim. Consider atovaquone.\par Alprazolam 0.5 mg , #30, refilled. I-STOP done.\par Anticoagulation for PAF as per Cardiology. Platelet count is adequate.\par Cyclosporine, CMP, LDH, Mg\par PI-linked antigen\par RTC in 4 weeks

## 2019-05-14 ENCOUNTER — APPOINTMENT (OUTPATIENT)
Dept: HEMATOLOGY ONCOLOGY | Facility: CLINIC | Age: 84
End: 2019-05-14

## 2019-05-17 LAB
ALBUMIN SERPL ELPH-MCNC: 4.2 G/DL
ALP BLD-CCNC: 83 U/L
ALT SERPL-CCNC: 24 U/L
ANION GAP SERPL CALC-SCNC: 13 MMOL/L
AST SERPL-CCNC: 29 U/L
BILIRUB SERPL-MCNC: 0.4 MG/DL
BUN SERPL-MCNC: 41 MG/DL
CALCIUM SERPL-MCNC: 9.4 MG/DL
CHLORIDE SERPL-SCNC: 103 MMOL/L
CO2 SERPL-SCNC: 26 MMOL/L
CREAT SERPL-MCNC: 1.71 MG/DL
CYCLOSPORINE SER-MCNC: 35 NG/ML
CYCLOSPORINE SER-MCNC: 51 NG/ML
GLUCOSE SERPL-MCNC: 122 MG/DL
LDH SERPL-CCNC: 205 U/L
MAGNESIUM SERPL-MCNC: 2.2 MG/DL
POTASSIUM SERPL-SCNC: 4.6 MMOL/L
PROT SERPL-MCNC: 7 G/DL
SODIUM SERPL-SCNC: 142 MMOL/L

## 2019-05-21 ENCOUNTER — RESULT REVIEW (OUTPATIENT)
Age: 84
End: 2019-05-21

## 2019-05-21 ENCOUNTER — LABORATORY RESULT (OUTPATIENT)
Age: 84
End: 2019-05-21

## 2019-05-21 ENCOUNTER — APPOINTMENT (OUTPATIENT)
Dept: HEMATOLOGY ONCOLOGY | Facility: CLINIC | Age: 84
End: 2019-05-21
Payer: MEDICARE

## 2019-05-21 VITALS
SYSTOLIC BLOOD PRESSURE: 110 MMHG | WEIGHT: 225.97 LBS | OXYGEN SATURATION: 93 % | BODY MASS INDEX: 28.24 KG/M2 | DIASTOLIC BLOOD PRESSURE: 75 MMHG | RESPIRATION RATE: 16 BRPM | TEMPERATURE: 97.4 F | HEART RATE: 92 BPM

## 2019-05-21 LAB
BASOPHILS # BLD AUTO: 0 K/UL — SIGNIFICANT CHANGE UP (ref 0–0.2)
BASOPHILS NFR BLD AUTO: 1 % — SIGNIFICANT CHANGE UP (ref 0–2)
EOSINOPHIL # BLD AUTO: 0.1 K/UL — SIGNIFICANT CHANGE UP (ref 0–0.5)
EOSINOPHIL NFR BLD AUTO: 3.9 % — SIGNIFICANT CHANGE UP (ref 0–6)
HCT VFR BLD CALC: 44.7 % — SIGNIFICANT CHANGE UP (ref 39–50)
HGB BLD-MCNC: 15.6 G/DL — SIGNIFICANT CHANGE UP (ref 13–17)
LYMPHOCYTES # BLD AUTO: 0.4 K/UL — LOW (ref 1–3.3)
LYMPHOCYTES # BLD AUTO: 13.7 % — SIGNIFICANT CHANGE UP (ref 13–44)
MCHC RBC-ENTMCNC: 34.3 PG — HIGH (ref 27–34)
MCHC RBC-ENTMCNC: 35 G/DL — SIGNIFICANT CHANGE UP (ref 32–36)
MCV RBC AUTO: 97.8 FL — SIGNIFICANT CHANGE UP (ref 80–100)
MONOCYTES # BLD AUTO: 0.4 K/UL — SIGNIFICANT CHANGE UP (ref 0–0.9)
MONOCYTES NFR BLD AUTO: 13.4 % — SIGNIFICANT CHANGE UP (ref 2–14)
NEUTROPHILS # BLD AUTO: 2.2 K/UL — SIGNIFICANT CHANGE UP (ref 1.8–7.4)
NEUTROPHILS NFR BLD AUTO: 68 % — SIGNIFICANT CHANGE UP (ref 43–77)
PLATELET # BLD AUTO: 96 K/UL — LOW (ref 150–400)
RBC # BLD: 4.56 M/UL — SIGNIFICANT CHANGE UP (ref 4.2–5.8)
RBC # FLD: 13.2 % — SIGNIFICANT CHANGE UP (ref 10.3–14.5)
WBC # BLD: 3.2 K/UL — LOW (ref 3.8–10.5)
WBC # FLD AUTO: 3.2 K/UL — LOW (ref 3.8–10.5)

## 2019-05-21 PROCEDURE — 99214 OFFICE O/P EST MOD 30 MIN: CPT

## 2019-05-21 NOTE — ASSESSMENT
[Palliative Care Plan] : not applicable at this time [FreeTextEntry1] : 84 YO M with aplastic anemia plus a PNH subpopulation. Last marrow suggestive of dyspoietic changes. 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 Dr. Tomas is observing this expectantly. His PNH screen is now normal. Following normalization of his platelets with Promacta, he became markedly thrombocytopenic. Marrow showed decreased megakaryocytes. 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 We opted to treat with rabbit ATG, Sandimmune and prednisone in attempt to regain remission.  He appears to be responding.\par \par Plan:\par Sandimmune 50 mg bid\par Acyclovir/Diflucan, renewed \par Hold Bactrim. Consider atovaquone.\par Cyclosporine, CMP, LDH, Mg\par RTC in 4 weeks

## 2019-05-21 NOTE — PHYSICAL EXAM
[Restricted in physically strenuous activity but ambulatory and able to carry out work of a light or sedentary nature] : Status 1- Restricted in physically strenuous activity but ambulatory and able to carry out work of a light or sedentary nature, e.g., light house work, office work [Normal] : affect appropriate [de-identified] : AICD left anterior chest wall. RRR. Irreg irreg.No murmurs, gallops. [de-identified] : Senile purpura on arms with brawny changes.

## 2019-05-21 NOTE — HISTORY OF PRESENT ILLNESS
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  Notes chronic neck pain persists. Deep massages help.  Still with intermittent pain, numbness and swelling in his hands. No other complaints. No new fever, bleeding, jaundice,HA,  hematuria, dark urine, visual problems, chest pain, SOB, abdominal pain, melena, BRBPR. Weight has been stable. Bactrim held for renal dysfunction. Sees his cardiologist every two, and Derm every 4  months.

## 2019-05-21 NOTE — RESULTS/DATA
[FreeTextEntry1] : WBC 3,200, Hgb 15.6, Hct 44.7, MCV 97.8, Plts 96,000, Diff:  normal  ANC 2,200\par \par \par \par 04/26/19\par CMP: Glucose 116, BUN 49, Creatinine 1.76,  eGFR 35\par LDH: 222\par Magnesium: 2.8\par Cyclosporine: 38\par \par Flow cytometry immunophenotype, Normal, no PNH detected  \par \par

## 2019-05-24 LAB
ALBUMIN SERPL ELPH-MCNC: 4 G/DL
ALBUMIN SERPL ELPH-MCNC: 4.1 G/DL
ALBUMIN SERPL ELPH-MCNC: 4.4 G/DL
ALP BLD-CCNC: 73 U/L
ALP BLD-CCNC: 78 U/L
ALP BLD-CCNC: 84 U/L
ALT SERPL-CCNC: 13 U/L
ALT SERPL-CCNC: 19 U/L
ALT SERPL-CCNC: 29 U/L
ANION GAP SERPL CALC-SCNC: 11 MMOL/L
ANION GAP SERPL CALC-SCNC: 15 MMOL/L
ANION GAP SERPL CALC-SCNC: 1717 MMOL/L
AST SERPL-CCNC: 20 U/L
AST SERPL-CCNC: 25 U/L
AST SERPL-CCNC: 30 U/L
BILIRUB SERPL-MCNC: 0.4 MG/DL
BILIRUB SERPL-MCNC: 0.5 MG/DL
BILIRUB SERPL-MCNC: 0.7 MG/DL
BUN SERPL-MCNC: 48 MG/DL
BUN SERPL-MCNC: 48 MG/DL
BUN SERPL-MCNC: 54 MG/DL
CALCIUM SERPL-MCNC: 10.4 MG/DL
CALCIUM SERPL-MCNC: 8.8 MG/DL
CALCIUM SERPL-MCNC: 9.5 MG/DL
CHLORIDE SERPL-SCNC: 103 MMOL/L
CHLORIDE SERPL-SCNC: 104 MMOL/L
CHLORIDE SERPL-SCNC: 108 MMOL/L
CO2 SERPL-SCNC: 22 MMOL/L
CO2 SERPL-SCNC: 28 MMOL/L
CO2 SERPL-SCNC: 28 MMOL/L
CREAT SERPL-MCNC: 1.41 MG/DL
CREAT SERPL-MCNC: 1.76 MG/DL
CREAT SERPL-MCNC: 1.83 MG/DL
GLUCOSE SERPL-MCNC: 128 MG/DL
GLUCOSE SERPL-MCNC: 90 MG/DL
GLUCOSE SERPL-MCNC: 98 MG/DL
LDH SERPL-CCNC: 157 U/L
LDH SERPL-CCNC: 188 U/L
LDH SERPL-CCNC: 208 U/L
MAGNESIUM SERPL-MCNC: 2.3 MG/DL
POTASSIUM SERPL-SCNC: 4.6 MMOL/L
POTASSIUM SERPL-SCNC: 4.8 MMOL/L
POTASSIUM SERPL-SCNC: 5.2 MMOL/L
PROT SERPL-MCNC: 6.6 G/DL
PROT SERPL-MCNC: 7 G/DL
PROT SERPL-MCNC: 7.1 G/DL
SODIUM SERPL-SCNC: 143 MMOL/L
SODIUM SERPL-SCNC: 146 MMOL/L
SODIUM SERPL-SCNC: 147 MMOL/L

## 2019-06-07 ENCOUNTER — APPOINTMENT (OUTPATIENT)
Dept: HEMATOLOGY ONCOLOGY | Facility: CLINIC | Age: 84
End: 2019-06-07

## 2019-06-14 ENCOUNTER — OUTPATIENT (OUTPATIENT)
Dept: OUTPATIENT SERVICES | Facility: HOSPITAL | Age: 84
LOS: 1 days | Discharge: HOME | End: 2019-06-14

## 2019-06-14 DIAGNOSIS — D59.5: ICD-10-CM

## 2019-06-14 DIAGNOSIS — Z98.890 OTHER SPECIFIED POSTPROCEDURAL STATES: Chronic | ICD-10-CM

## 2019-06-14 DIAGNOSIS — Z95.810 PRESENCE OF AUTOMATIC (IMPLANTABLE) CARDIAC DEFIBRILLATOR: Chronic | ICD-10-CM

## 2019-06-14 DIAGNOSIS — D61.9 APLASTIC ANEMIA, UNSPECIFIED: ICD-10-CM

## 2019-06-19 ENCOUNTER — RESULT REVIEW (OUTPATIENT)
Age: 84
End: 2019-06-19

## 2019-06-19 ENCOUNTER — APPOINTMENT (OUTPATIENT)
Dept: HEMATOLOGY ONCOLOGY | Facility: CLINIC | Age: 84
End: 2019-06-19
Payer: MEDICARE

## 2019-06-19 VITALS
SYSTOLIC BLOOD PRESSURE: 114 MMHG | TEMPERATURE: 97.6 F | RESPIRATION RATE: 17 BRPM | WEIGHT: 226.41 LBS | OXYGEN SATURATION: 98 % | BODY MASS INDEX: 28.3 KG/M2 | DIASTOLIC BLOOD PRESSURE: 80 MMHG | HEART RATE: 84 BPM

## 2019-06-19 LAB
BASOPHILS # BLD AUTO: 0 K/UL — SIGNIFICANT CHANGE UP (ref 0–0.2)
BASOPHILS NFR BLD AUTO: 0.5 % — SIGNIFICANT CHANGE UP (ref 0–2)
EOSINOPHIL # BLD AUTO: 0 K/UL — SIGNIFICANT CHANGE UP (ref 0–0.5)
EOSINOPHIL NFR BLD AUTO: 1.1 % — SIGNIFICANT CHANGE UP (ref 0–6)
HCT VFR BLD CALC: 43.9 % — SIGNIFICANT CHANGE UP (ref 39–50)
HGB BLD-MCNC: 14.8 G/DL — SIGNIFICANT CHANGE UP (ref 13–17)
LYMPHOCYTES # BLD AUTO: 0.5 K/UL — LOW (ref 1–3.3)
LYMPHOCYTES # BLD AUTO: 13.7 % — SIGNIFICANT CHANGE UP (ref 13–44)
MCHC RBC-ENTMCNC: 33.6 G/DL — SIGNIFICANT CHANGE UP (ref 32–36)
MCHC RBC-ENTMCNC: 33.6 PG — SIGNIFICANT CHANGE UP (ref 27–34)
MCV RBC AUTO: 100 FL — SIGNIFICANT CHANGE UP (ref 80–100)
MONOCYTES # BLD AUTO: 0.4 K/UL — SIGNIFICANT CHANGE UP (ref 0–0.9)
MONOCYTES NFR BLD AUTO: 12.5 % — SIGNIFICANT CHANGE UP (ref 2–14)
NEUTROPHILS # BLD AUTO: 2.4 K/UL — SIGNIFICANT CHANGE UP (ref 1.8–7.4)
NEUTROPHILS NFR BLD AUTO: 72.3 % — SIGNIFICANT CHANGE UP (ref 43–77)
PLATELET # BLD AUTO: 75 K/UL — LOW (ref 150–400)
RBC # BLD: 4.39 M/UL — SIGNIFICANT CHANGE UP (ref 4.2–5.8)
RBC # FLD: 14.1 % — SIGNIFICANT CHANGE UP (ref 10.3–14.5)
WBC # BLD: 3.3 K/UL — LOW (ref 3.8–10.5)
WBC # FLD AUTO: 3.3 K/UL — LOW (ref 3.8–10.5)

## 2019-06-19 PROCEDURE — 99214 OFFICE O/P EST MOD 30 MIN: CPT

## 2019-06-19 NOTE — HISTORY OF PRESENT ILLNESS
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 Reports occasional barometric headaches. Treated with Tylenol. Chronic neck pain persists. Intermittent pain, numbness and swelling in his hands. Decreased appetite. No other complaints. He notes no fever, bleeding, jaundice, hematuria, dark urine, visual problems, chest pain, SOB, abdominal pain, melena, BRBPR. Weight has been stable.

## 2019-06-19 NOTE — ADDENDUM
[FreeTextEntry1] : Documented by Keaton Roach acting as a scribe for Dr. Jad Yeager on 06/19/2019 \par \par All medical record entries made by the Scribe were at my, Dr. Jad Yeager's, direction and personally dictated by me on 06/19/2019. I have reviewed the chart and agree that the record accurately reflects my personal performance of the history, physical exam, results, assessment and plan. I have also personally directed, reviewed, and agree with the discharge instructions.

## 2019-06-19 NOTE — PHYSICAL EXAM
[Restricted in physically strenuous activity but ambulatory and able to carry out work of a light or sedentary nature] : Status 1- Restricted in physically strenuous activity but ambulatory and able to carry out work of a light or sedentary nature, e.g., light house work, office work [de-identified] : Senile purpura on arms with brawny changes.  [de-identified] : AICD left anterior chest wall. [Normal] : affect appropriate

## 2019-06-19 NOTE — RESULTS/DATA
[FreeTextEntry1] : WBC 3,300, Hgb 14.8, Hct 43.9, Plts 75,000, Diff normal, ANC 2,400\par \par 19\par CMP: Sodium 146, Glu 128, BUN 54, Creatinine 1.83, eGFR 34\par \par Mg 2.3\par Cyclosporine: 44\par \par 19\par CMP: Glu 116, BUN 49, Creatinine 1.76, eGFR 35\par \par M.8\par Cyclosporine: 38\par PNH: Normal phenotyping results.  No PNH clone is detected in RBC, granulocytes, or monocytes.\par

## 2019-06-19 NOTE — ASSESSMENT
[Palliative Care Plan] : not applicable at this time [FreeTextEntry1] : 83 year old male with aplastic anemia plus a PNH subpopulation. Last marrow suggestive of dyspoietic changes. 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 Dr. Tomas is observing this expectantly. His PNH screen is now normal. Following normalization of his platelets with Promacta, he became markedly thrombocytopenic. Marrow showed decreased megakaryocytes. 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 We opted to treat with rabbit ATG, Sandimmune and prednisone in attempt to regain remission.  He appears to be responding.\par \par Plan:\par Sandimmune 50 mg bid\par Acyclovir/Diflucan\par Hold Bactrim. Consider atovaquone.\par Anticoagulation for PAF as per Cardiology. Platelet count is adequate.\par Cyclosporine, CMP, LDH, Mg\par RTC in 1 month

## 2019-07-12 ENCOUNTER — APPOINTMENT (OUTPATIENT)
Dept: HEMATOLOGY ONCOLOGY | Facility: CLINIC | Age: 84
End: 2019-07-12

## 2019-07-17 ENCOUNTER — OUTPATIENT (OUTPATIENT)
Dept: OUTPATIENT SERVICES | Facility: HOSPITAL | Age: 84
LOS: 1 days | Discharge: HOME | End: 2019-07-17

## 2019-07-17 DIAGNOSIS — D59.5: ICD-10-CM

## 2019-07-17 DIAGNOSIS — Z95.810 PRESENCE OF AUTOMATIC (IMPLANTABLE) CARDIAC DEFIBRILLATOR: Chronic | ICD-10-CM

## 2019-07-17 DIAGNOSIS — D61.9 APLASTIC ANEMIA, UNSPECIFIED: ICD-10-CM

## 2019-07-17 DIAGNOSIS — Z98.890 OTHER SPECIFIED POSTPROCEDURAL STATES: Chronic | ICD-10-CM

## 2019-07-19 ENCOUNTER — RESULT REVIEW (OUTPATIENT)
Age: 84
End: 2019-07-19

## 2019-07-19 ENCOUNTER — APPOINTMENT (OUTPATIENT)
Dept: HEMATOLOGY ONCOLOGY | Facility: CLINIC | Age: 84
End: 2019-07-19
Payer: MEDICARE

## 2019-07-19 LAB
ALBUMIN SERPL ELPH-MCNC: 4 G/DL
ALBUMIN SERPL ELPH-MCNC: 4 G/DL
ALP BLD-CCNC: 80 U/L
ALP BLD-CCNC: 82 U/L
ALT SERPL-CCNC: 18 U/L
ALT SERPL-CCNC: 23 U/L
ANION GAP SERPL CALC-SCNC: 13 MMOL/L
ANION GAP SERPL CALC-SCNC: 14 MMOL/L
AST SERPL-CCNC: 27 U/L
AST SERPL-CCNC: 29 U/L
BASOPHILS # BLD AUTO: 0 K/UL — SIGNIFICANT CHANGE UP (ref 0–0.2)
BASOPHILS NFR BLD AUTO: 0 % — SIGNIFICANT CHANGE UP (ref 0–2)
BILIRUB SERPL-MCNC: 1 MG/DL
BILIRUB SERPL-MCNC: 1.1 MG/DL
BUN SERPL-MCNC: 44 MG/DL
BUN SERPL-MCNC: 54 MG/DL
CALCIUM SERPL-MCNC: 10 MG/DL
CALCIUM SERPL-MCNC: 9.8 MG/DL
CHLORIDE SERPL-SCNC: 101 MMOL/L
CHLORIDE SERPL-SCNC: 103 MMOL/L
CO2 SERPL-SCNC: 28 MMOL/L
CO2 SERPL-SCNC: 30 MMOL/L
CREAT SERPL-MCNC: 1.88 MG/DL
CREAT SERPL-MCNC: 2.2 MG/DL
CYCLOSPORINE SER-MCNC: 193 NG/ML
CYCLOSPORINE SER-MCNC: 77 NG/ML
EOSINOPHIL # BLD AUTO: 0 K/UL — SIGNIFICANT CHANGE UP (ref 0–0.5)
EOSINOPHIL NFR BLD AUTO: 0.9 % — SIGNIFICANT CHANGE UP (ref 0–6)
GLUCOSE SERPL-MCNC: 126 MG/DL
GLUCOSE SERPL-MCNC: 168 MG/DL
HCT VFR BLD CALC: 49.1 % — SIGNIFICANT CHANGE UP (ref 39–50)
HGB BLD-MCNC: 15 G/DL — SIGNIFICANT CHANGE UP (ref 13–17)
LDH SERPL-CCNC: 214 U/L
LDH SERPL-CCNC: 239 U/L
LYMPHOCYTES # BLD AUTO: 0.5 K/UL — LOW (ref 1–3.3)
LYMPHOCYTES # BLD AUTO: 17.3 % — SIGNIFICANT CHANGE UP (ref 13–44)
MAGNESIUM SERPL-MCNC: 2 MG/DL
MAGNESIUM SERPL-MCNC: 2.1 MG/DL
MCHC RBC-ENTMCNC: 30.5 G/DL — LOW (ref 32–36)
MCHC RBC-ENTMCNC: 30.9 PG — SIGNIFICANT CHANGE UP (ref 27–34)
MCV RBC AUTO: 101 FL — HIGH (ref 80–100)
MONOCYTES # BLD AUTO: 0.5 K/UL — SIGNIFICANT CHANGE UP (ref 0–0.9)
MONOCYTES NFR BLD AUTO: 15.5 % — HIGH (ref 2–14)
NEUTROPHILS # BLD AUTO: 2.1 K/UL — SIGNIFICANT CHANGE UP (ref 1.8–7.4)
NEUTROPHILS NFR BLD AUTO: 66.4 % — SIGNIFICANT CHANGE UP (ref 43–77)
PLATELET # BLD AUTO: 80 K/UL — LOW (ref 150–400)
POTASSIUM SERPL-SCNC: 4.3 MMOL/L
POTASSIUM SERPL-SCNC: 4.4 MMOL/L
PROT SERPL-MCNC: 6.4 G/DL
PROT SERPL-MCNC: 6.4 G/DL
RBC # BLD: 4.85 M/UL — SIGNIFICANT CHANGE UP (ref 4.2–5.8)
RBC # FLD: 14.1 % — SIGNIFICANT CHANGE UP (ref 10.3–14.5)
SODIUM SERPL-SCNC: 144 MMOL/L
SODIUM SERPL-SCNC: 145 MMOL/L
WBC # BLD: 3.1 K/UL — LOW (ref 3.8–10.5)
WBC # FLD AUTO: 3.1 K/UL — LOW (ref 3.8–10.5)

## 2019-07-19 PROCEDURE — 99213 OFFICE O/P EST LOW 20 MIN: CPT

## 2019-07-24 LAB
ABR COMMENT: NORMAL
ALBUMIN SERPL ELPH-MCNC: 4.2 G/DL
ALP BLD-CCNC: 88 U/L
ALT SERPL-CCNC: 33 U/L
ANION GAP SERPL CALC-SCNC: 14 MMOL/L
AST SERPL-CCNC: 35 U/L
BILIRUB SERPL-MCNC: 0.4 MG/DL
BUN SERPL-MCNC: 49 MG/DL
CALCIUM SERPL-MCNC: 9.6 MG/DL
CHLORIDE SERPL-SCNC: 102 MMOL/L
CO2 SERPL-SCNC: 28 MMOL/L
CREAT SERPL-MCNC: 1.76 MG/DL
CYCLOSPORINE SER-MCNC: 38 NG/ML
GLUCOSE SERPL-MCNC: 116 MG/DL
LDH SERPL-CCNC: 222 U/L
MAGNESIUM SERPL-MCNC: 2.8 MG/DL
PNH GRANULOCYTES: 0 %
PNH MONOCYTES: 0 %
PNH RBC - COMPLETE: 0 %
PNH RBC - PARTIAL: 0.01 %
POTASSIUM SERPL-SCNC: 4.7 MMOL/L
PROT SERPL-MCNC: 7.1 G/DL
SODIUM SERPL-SCNC: 144 MMOL/L

## 2019-07-29 NOTE — ASSESSMENT
[Palliative Care Plan] : not applicable at this time [FreeTextEntry1] : 84 year old male with aplastic anemia plus a PNH subpopulation. Last marrow suggestive of dyspoietic changes. 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 Dr. Tomas is observing this expectantly. His PNH screen is now normal. Following normalization of his platelets with Promacta, he became markedly thrombocytopenic. Marrow showed decreased megakaryocytes. 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 We opted to treat with rabbit ATG, Sandimmune and prednisone in attempt to regain remission.  He appears to be responding.\par \par \par Plan:\par Sandimmune 50 mg bid\par Acyclovir/Diflucan\par Hold Bactrim. Consider atovaquone.\par Anticoagulation for PAF as per Cardiology. Platelet count is adequate.\par Cyclosporine, CMP, LDH, Mg\par ASA 81 mg qd plts >50,000\par RTC in 1 month

## 2019-07-29 NOTE — HISTORY OF PRESENT ILLNESS
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 Saw his cardiologist last week, he would like him to start on ASA.  Reports occasional barometric headaches. Treated with Tylenol. Chronic neck pain persists. Intermittent pain, numbness and swelling in his hands. Decreased appetite. No other complaints. He notes no fever, bleeding, jaundice, hematuria, dark urine, visual problems, chest pain, SOB, abdominal pain, melena, BRBPR. Weight has been stable.

## 2019-08-15 ENCOUNTER — FORM ENCOUNTER (OUTPATIENT)
Age: 84
End: 2019-08-15

## 2019-08-16 ENCOUNTER — APPOINTMENT (OUTPATIENT)
Dept: HEMATOLOGY ONCOLOGY | Facility: CLINIC | Age: 84
End: 2019-08-16
Payer: MEDICARE

## 2019-08-16 ENCOUNTER — APPOINTMENT (OUTPATIENT)
Dept: ULTRASOUND IMAGING | Facility: IMAGING CENTER | Age: 84
End: 2019-08-16
Payer: MEDICARE

## 2019-08-16 ENCOUNTER — RESULT REVIEW (OUTPATIENT)
Age: 84
End: 2019-08-16

## 2019-08-16 ENCOUNTER — OUTPATIENT (OUTPATIENT)
Dept: OUTPATIENT SERVICES | Facility: HOSPITAL | Age: 84
LOS: 1 days | End: 2019-08-16
Payer: MEDICARE

## 2019-08-16 VITALS
BODY MASS INDEX: 28 KG/M2 | WEIGHT: 223.99 LBS | OXYGEN SATURATION: 98 % | HEART RATE: 81 BPM | TEMPERATURE: 98.4 F | RESPIRATION RATE: 16 BRPM | DIASTOLIC BLOOD PRESSURE: 73 MMHG | SYSTOLIC BLOOD PRESSURE: 104 MMHG

## 2019-08-16 DIAGNOSIS — Z98.890 OTHER SPECIFIED POSTPROCEDURAL STATES: Chronic | ICD-10-CM

## 2019-08-16 DIAGNOSIS — Z00.8 ENCOUNTER FOR OTHER GENERAL EXAMINATION: ICD-10-CM

## 2019-08-16 DIAGNOSIS — Z95.810 PRESENCE OF AUTOMATIC (IMPLANTABLE) CARDIAC DEFIBRILLATOR: Chronic | ICD-10-CM

## 2019-08-16 LAB
EOSINOPHIL # BLD AUTO: 0.1 K/UL — SIGNIFICANT CHANGE UP (ref 0–0.5)
EOSINOPHIL NFR BLD AUTO: 4 % — SIGNIFICANT CHANGE UP (ref 0–6)
HCT VFR BLD CALC: 45.4 % — SIGNIFICANT CHANGE UP (ref 39–50)
HGB BLD-MCNC: 15.1 G/DL — SIGNIFICANT CHANGE UP (ref 13–17)
LYMPHOCYTES # BLD AUTO: 0.4 K/UL — LOW (ref 1–3.3)
LYMPHOCYTES # BLD AUTO: 17 % — SIGNIFICANT CHANGE UP (ref 13–44)
MCHC RBC-ENTMCNC: 33.1 G/DL — SIGNIFICANT CHANGE UP (ref 32–36)
MCHC RBC-ENTMCNC: 33.6 PG — SIGNIFICANT CHANGE UP (ref 27–34)
MCV RBC AUTO: 101 FL — HIGH (ref 80–100)
MONOCYTES # BLD AUTO: 0.4 K/UL — SIGNIFICANT CHANGE UP (ref 0–0.9)
MONOCYTES NFR BLD AUTO: 11 % — SIGNIFICANT CHANGE UP (ref 2–14)
NEUTROPHILS # BLD AUTO: 2.5 K/UL — SIGNIFICANT CHANGE UP (ref 1.8–7.4)
NEUTROPHILS NFR BLD AUTO: 68 % — SIGNIFICANT CHANGE UP (ref 43–77)
PLAT MORPH BLD: NORMAL — SIGNIFICANT CHANGE UP
PLATELET # BLD AUTO: 111 K/UL — LOW (ref 150–400)
RBC # BLD: 4.48 M/UL — SIGNIFICANT CHANGE UP (ref 4.2–5.8)
RBC # FLD: 13.9 % — SIGNIFICANT CHANGE UP (ref 10.3–14.5)
RBC BLD AUTO: SIGNIFICANT CHANGE UP
WBC # BLD: 3.4 K/UL — LOW (ref 3.8–10.5)
WBC # FLD AUTO: 3.4 K/UL — LOW (ref 3.8–10.5)

## 2019-08-16 PROCEDURE — 99214 OFFICE O/P EST MOD 30 MIN: CPT

## 2019-08-16 PROCEDURE — 93971 EXTREMITY STUDY: CPT

## 2019-08-16 PROCEDURE — 93971 EXTREMITY STUDY: CPT | Mod: 26,RT

## 2019-08-26 NOTE — HISTORY OF PRESENT ILLNESS
Radha Holm           6/25/19 2:42 PM   Note      FYI:     PSR called patient to rescheduled her missed appointment on 6/25/19 and patient stated that she was not feeling well and will call back to reschedule.     PSR place appointment on the wait list.     Patient can be reached at [phone] for any questions.           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  No new  complaints. Just celebrated his 40 wedding anniversary this weekend.   No new HAs, fever, bleeding, jaundice, hematuria, dark urine, visual problems, chest pain, SOB, abdominal pain, melena, BRBPR. Weight has been stable.  Tolerating the treatment well. Weight stable.

## 2019-09-11 ENCOUNTER — OUTPATIENT (OUTPATIENT)
Dept: OUTPATIENT SERVICES | Facility: HOSPITAL | Age: 84
LOS: 1 days | Discharge: HOME | End: 2019-09-11

## 2019-09-11 DIAGNOSIS — Z95.810 PRESENCE OF AUTOMATIC (IMPLANTABLE) CARDIAC DEFIBRILLATOR: Chronic | ICD-10-CM

## 2019-09-11 DIAGNOSIS — D61.9 APLASTIC ANEMIA, UNSPECIFIED: ICD-10-CM

## 2019-09-11 DIAGNOSIS — D59.5: ICD-10-CM

## 2019-09-11 DIAGNOSIS — Z98.890 OTHER SPECIFIED POSTPROCEDURAL STATES: Chronic | ICD-10-CM

## 2019-09-13 ENCOUNTER — RESULT REVIEW (OUTPATIENT)
Age: 84
End: 2019-09-13

## 2019-09-13 ENCOUNTER — APPOINTMENT (OUTPATIENT)
Dept: HEMATOLOGY ONCOLOGY | Facility: CLINIC | Age: 84
End: 2019-09-13
Payer: MEDICARE

## 2019-09-13 VITALS
HEART RATE: 104 BPM | DIASTOLIC BLOOD PRESSURE: 66 MMHG | BODY MASS INDEX: 27.56 KG/M2 | RESPIRATION RATE: 18 BRPM | WEIGHT: 220.46 LBS | TEMPERATURE: 97.3 F | OXYGEN SATURATION: 99 % | SYSTOLIC BLOOD PRESSURE: 96 MMHG

## 2019-09-13 DIAGNOSIS — F41.8 OTHER SPECIFIED ANXIETY DISORDERS: ICD-10-CM

## 2019-09-13 DIAGNOSIS — Z78.9 OTHER SPECIFIED HEALTH STATUS: ICD-10-CM

## 2019-09-13 LAB
ALBUMIN SERPL ELPH-MCNC: 3.8 G/DL
ALP BLD-CCNC: 132 U/L
ALT SERPL-CCNC: 23 U/L
ANION GAP SERPL CALC-SCNC: 14 MMOL/L
AST SERPL-CCNC: 31 U/L
BASOPHILS # BLD AUTO: 0 K/UL — SIGNIFICANT CHANGE UP (ref 0–0.2)
BASOPHILS NFR BLD AUTO: 0.6 % — SIGNIFICANT CHANGE UP (ref 0–2)
BILIRUB SERPL-MCNC: 1.1 MG/DL
BUN SERPL-MCNC: 65 MG/DL
CALCIUM SERPL-MCNC: 9.5 MG/DL
CHLORIDE SERPL-SCNC: 98 MMOL/L
CO2 SERPL-SCNC: 28 MMOL/L
CREAT SERPL-MCNC: 2.2 MG/DL
CYCLOSPORINE SER-MCNC: 310 NG/ML
EOSINOPHIL # BLD AUTO: 0.1 K/UL — SIGNIFICANT CHANGE UP (ref 0–0.5)
EOSINOPHIL NFR BLD AUTO: 1.6 % — SIGNIFICANT CHANGE UP (ref 0–6)
GLUCOSE SERPL-MCNC: 131 MG/DL
HCT VFR BLD CALC: 44.8 % — SIGNIFICANT CHANGE UP (ref 39–50)
HGB BLD-MCNC: 15.2 G/DL — SIGNIFICANT CHANGE UP (ref 13–17)
LDH SERPL-CCNC: 221 U/L
LYMPHOCYTES # BLD AUTO: 0.4 K/UL — LOW (ref 1–3.3)
LYMPHOCYTES # BLD AUTO: 10.8 % — LOW (ref 13–44)
MAGNESIUM SERPL-MCNC: 2.5 MG/DL
MCHC RBC-ENTMCNC: 34 G/DL — SIGNIFICANT CHANGE UP (ref 32–36)
MCHC RBC-ENTMCNC: 34.5 PG — HIGH (ref 27–34)
MCV RBC AUTO: 101 FL — HIGH (ref 80–100)
MONOCYTES # BLD AUTO: 0.5 K/UL — SIGNIFICANT CHANGE UP (ref 0–0.9)
MONOCYTES NFR BLD AUTO: 14 % — SIGNIFICANT CHANGE UP (ref 2–14)
NEUTROPHILS # BLD AUTO: 2.5 K/UL — SIGNIFICANT CHANGE UP (ref 1.8–7.4)
NEUTROPHILS NFR BLD AUTO: 73 % — SIGNIFICANT CHANGE UP (ref 43–77)
PLAT MORPH BLD: NORMAL — SIGNIFICANT CHANGE UP
PLATELET # BLD AUTO: 90 K/UL — LOW (ref 150–400)
POTASSIUM SERPL-SCNC: 4.9 MMOL/L
PROT SERPL-MCNC: 6.4 G/DL
RBC # BLD: 4.42 M/UL — SIGNIFICANT CHANGE UP (ref 4.2–5.8)
RBC # FLD: 16 % — HIGH (ref 10.3–14.5)
RBC BLD AUTO: SIGNIFICANT CHANGE UP
SODIUM SERPL-SCNC: 140 MMOL/L
WBC # BLD: 3.5 K/UL — LOW (ref 3.8–10.5)
WBC # FLD AUTO: 3.5 K/UL — LOW (ref 3.8–10.5)

## 2019-09-13 PROCEDURE — 99213 OFFICE O/P EST LOW 20 MIN: CPT

## 2019-09-15 NOTE — REASON FOR VISIT
[Follow-Up Visit] : a follow-up visit for [Blood Count Assessment] : blood count assessment [Spouse] : spouse [FreeTextEntry2] : aplastic anemia

## 2019-09-15 NOTE — ADDENDUM
[FreeTextEntry1] : Documented by Keaton Roach acting as a scribe for Dr. Jad Yeager on 09/13/2019 \par \par All medical record entries made by the Scribe were at my, Dr. Jad Yeager's, direction and personally dictated by me on 09/13/2019. I have reviewed the chart and agree that the record accurately reflects my personal performance of the history, physical exam, results, assessment and plan. I have also personally directed, reviewed, and agree with the discharge instructions.

## 2019-09-15 NOTE — CONSULT LETTER
[Dear  ___] : Dear  [unfilled], [Courtesy Letter:] : I had the pleasure of seeing your patient, [unfilled], in my office today. [Sincerely,] : Sincerely, [Please see my note below.] : Please see my note below. [DrDouglas  ___] : Dr. ALMEIDA [DrDouglas ___] : Dr. ALMEIDA [FreeTextEntry2] : Venkat Lopez MD [FreeTextEntry3] : Jad Yeager MD

## 2019-09-15 NOTE — REVIEW OF SYSTEMS
[Joint Pain] : joint pain [Negative] : Allergic/Immunologic [Constipation] : constipation [Lower Ext Edema] : lower extremity edema [FreeTextEntry5] : scratch regina on legs  [FreeTextEntry7] : appetite fair  [FreeTextEntry9] : neck pain [de-identified] : had infected toe nail resolving with soaks and betadine / fungal rash groin area wears diapers [de-identified] : new since D/c hosp   August fluid retention  weak balance not stable / headache tylenol helps

## 2019-09-15 NOTE — HISTORY OF PRESENT ILLNESS
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 Reports occasional barometric headaches. Treated with Tylenol. Chronic neck pain persists. Intermittent pain, numbness and swelling in his hands. Decreased appetite. No other complaints. He notes no fever, bleeding, jaundice, hematuria, dark urine, visual problems, chest pain, SOB, abdominal pain, melena, BRBPR. Weight has been stable.\par creat rising follow labs

## 2019-09-15 NOTE — ASSESSMENT
[Palliative Care Plan] : not applicable at this time [Palliative] : Goals of care discussed with patient: Palliative [FreeTextEntry1] : 84 year old male with aplastic anemia plus a PNH subpopulation. Last marrow suggestive of dyspoietic changes. 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 Dr. Tomas is observing this expectantly. His PNH screen is now normal. Following normalization of his platelets with Promacta, he became markedly thrombocytopenic. Marrow showed decreased megakaryocytes. 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 We opted to treat with rabbit ATG, Sandimmune and prednisone in attempt to regain remission.  He appears to be responding.\par \par Plan:\par Sandimmune 50 mg bid\par Acyclovir/Diflucan\par Hold Bactrim. Consider atovaquone.\par Anticoagulation for PAF as per Cardiology. Platelet count is adequate.\par Cyclosporine, CMP, LDH, Mg\par RTC in 1 month

## 2019-09-16 ENCOUNTER — RX RENEWAL (OUTPATIENT)
Age: 84
End: 2019-09-16

## 2019-09-16 LAB
ALBUMIN SERPL ELPH-MCNC: 3.9 G/DL
ALP BLD-CCNC: 123 U/L
ALT SERPL-CCNC: 21 U/L
ANION GAP SERPL CALC-SCNC: 16 MMOL/L
AST SERPL-CCNC: 25 U/L
BILIRUB SERPL-MCNC: 1.1 MG/DL
BUN SERPL-MCNC: 65 MG/DL
CALCIUM SERPL-MCNC: 9.9 MG/DL
CHLORIDE SERPL-SCNC: 100 MMOL/L
CO2 SERPL-SCNC: 30 MMOL/L
CREAT SERPL-MCNC: 2.54 MG/DL
CYCLOSPORINE SER-MCNC: 89 NG/ML
GLUCOSE SERPL-MCNC: 119 MG/DL
LDH SERPL-CCNC: 197 U/L
MAGNESIUM SERPL-MCNC: 2.5 MG/DL
POTASSIUM SERPL-SCNC: 4.3 MMOL/L
PROT SERPL-MCNC: 6.4 G/DL
SODIUM SERPL-SCNC: 146 MMOL/L

## 2019-09-16 RX ORDER — CYCLOSPORINE 100 MG/ML
100 SOLUTION ORAL
Qty: 1 | Refills: 2 | Status: DISCONTINUED | COMMUNITY
Start: 2018-12-07 | End: 2019-09-16

## 2019-09-19 ENCOUNTER — CLINICAL ADVICE (OUTPATIENT)
Age: 84
End: 2019-09-19

## 2019-09-19 RX ORDER — FLUCONAZOLE 100 MG/1
100 TABLET ORAL DAILY
Qty: 30 | Refills: 5 | Status: DISCONTINUED | COMMUNITY
Start: 2018-12-07 | End: 2019-09-19

## 2019-09-19 RX ORDER — FLUCONAZOLE 100 MG/1
100 TABLET ORAL DAILY
Qty: 30 | Refills: 2 | Status: DISCONTINUED | COMMUNITY
Start: 2019-09-13 | End: 2019-09-19

## 2019-09-19 RX ORDER — ACYCLOVIR 400 MG/1
400 TABLET ORAL TWICE DAILY
Qty: 60 | Refills: 5 | Status: DISCONTINUED | COMMUNITY
Start: 2018-12-07 | End: 2019-09-19

## 2019-10-12 ENCOUNTER — OUTPATIENT (OUTPATIENT)
Dept: OUTPATIENT SERVICES | Facility: HOSPITAL | Age: 84
LOS: 1 days | Discharge: HOME | End: 2019-10-12

## 2019-10-12 DIAGNOSIS — D61.9 APLASTIC ANEMIA, UNSPECIFIED: ICD-10-CM

## 2019-10-12 DIAGNOSIS — Z95.810 PRESENCE OF AUTOMATIC (IMPLANTABLE) CARDIAC DEFIBRILLATOR: Chronic | ICD-10-CM

## 2019-10-12 DIAGNOSIS — Z98.890 OTHER SPECIFIED POSTPROCEDURAL STATES: Chronic | ICD-10-CM

## 2019-10-12 DIAGNOSIS — D59.5: ICD-10-CM

## 2019-10-15 ENCOUNTER — RESULT REVIEW (OUTPATIENT)
Age: 84
End: 2019-10-15

## 2019-10-15 ENCOUNTER — APPOINTMENT (OUTPATIENT)
Dept: HEMATOLOGY ONCOLOGY | Facility: CLINIC | Age: 84
End: 2019-10-15
Payer: MEDICARE

## 2019-10-15 VITALS
RESPIRATION RATE: 17 BRPM | HEART RATE: 90 BPM | WEIGHT: 224.21 LBS | DIASTOLIC BLOOD PRESSURE: 74 MMHG | OXYGEN SATURATION: 100 % | TEMPERATURE: 97.2 F | SYSTOLIC BLOOD PRESSURE: 112 MMHG | BODY MASS INDEX: 28.02 KG/M2

## 2019-10-15 LAB
BASOPHILS # BLD AUTO: 0 K/UL — SIGNIFICANT CHANGE UP (ref 0–0.2)
BASOPHILS NFR BLD AUTO: 0.8 % — SIGNIFICANT CHANGE UP (ref 0–2)
EOSINOPHIL # BLD AUTO: 0.1 K/UL — SIGNIFICANT CHANGE UP (ref 0–0.5)
EOSINOPHIL NFR BLD AUTO: 2.8 % — SIGNIFICANT CHANGE UP (ref 0–6)
HCT VFR BLD CALC: 47.3 % — SIGNIFICANT CHANGE UP (ref 39–50)
HGB BLD-MCNC: 16 G/DL — SIGNIFICANT CHANGE UP (ref 13–17)
LYMPHOCYTES # BLD AUTO: 0.5 K/UL — LOW (ref 1–3.3)
LYMPHOCYTES # BLD AUTO: 15.8 % — SIGNIFICANT CHANGE UP (ref 13–44)
MCHC RBC-ENTMCNC: 33.8 G/DL — SIGNIFICANT CHANGE UP (ref 32–36)
MCHC RBC-ENTMCNC: 34.1 PG — HIGH (ref 27–34)
MCV RBC AUTO: 101 FL — HIGH (ref 80–100)
MONOCYTES # BLD AUTO: 0.5 K/UL — SIGNIFICANT CHANGE UP (ref 0–0.9)
MONOCYTES NFR BLD AUTO: 13.2 % — SIGNIFICANT CHANGE UP (ref 2–14)
NEUTROPHILS # BLD AUTO: 2.3 K/UL — SIGNIFICANT CHANGE UP (ref 1.8–7.4)
NEUTROPHILS NFR BLD AUTO: 67.5 % — SIGNIFICANT CHANGE UP (ref 43–77)
PLATELET # BLD AUTO: 99 K/UL — LOW (ref 150–400)
RBC # BLD: 4.7 M/UL — SIGNIFICANT CHANGE UP (ref 4.2–5.8)
RBC # FLD: 15.8 % — HIGH (ref 10.3–14.5)
WBC # BLD: 3.4 K/UL — LOW (ref 3.8–10.5)
WBC # FLD AUTO: 3.4 K/UL — LOW (ref 3.8–10.5)

## 2019-10-15 PROCEDURE — 99214 OFFICE O/P EST MOD 30 MIN: CPT

## 2019-10-15 RX ORDER — CARVEDILOL 12.5 MG/1
12.5 TABLET, FILM COATED ORAL
Qty: 60 | Refills: 0 | Status: DISCONTINUED | COMMUNITY
Start: 2019-08-05 | End: 2019-10-15

## 2019-10-15 RX ORDER — TORSEMIDE 20 MG/1
20 TABLET ORAL DAILY
Refills: 0 | Status: ACTIVE | COMMUNITY
Start: 2018-12-12

## 2019-10-15 RX ORDER — ALPRAZOLAM 0.5 MG/1
0.5 TABLET ORAL
Qty: 30 | Refills: 0 | Status: DISCONTINUED | COMMUNITY
Start: 2019-09-13 | End: 2019-10-15

## 2019-10-15 NOTE — HISTORY OF PRESENT ILLNESS
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 Feels tired. Reports problem with balance. He has fallen several times. Ambulates with walker. He was recently admitted to hospital after fall in podiatrist's office. Spent 8 days in the hospital. Discharged with [facility] and PT.  Reports occasional barometric headaches. Chronic neck pain persists. Intermittent pain, numbness and swelling in his hands. Decreased appetite. No other complaints. He notes no fever, bleeding, jaundice, hematuria, dark urine, visual problems, chest pain, SOB, abdominal pain, melena, BRBPR. Weight has been stable. [FreeTextEntry1] : 7/2013 -- 6/2014 Sandimmune/Prednisone;  7/2015- 10/15 Sandimmune; 10/30/15 Eltrombopag; 12/18 Rabbit ATG/Sandimmune/prednisone

## 2019-10-15 NOTE — ADDENDUM
[FreeTextEntry1] : Documented by Keaton Roach acting as a scribe for Dr. Jad Yeager on 10/15/2019 \par \par All medical record entries made by the Scribe were at my, Dr. Jad Yeager's, direction and personally dictated by me on 10/15/2019. I have reviewed the chart and agree that the record accurately reflects my personal performance of the history, physical exam, results, assessment and plan. I have also personally directed, reviewed, and agree with the discharge instructions.

## 2019-10-15 NOTE — ASSESSMENT
[Palliative Care Plan] : not applicable at this time [FreeTextEntry1] : 84 year old male with aplastic anemia plus a PNH subpopulation. Last marrow suggestive of dyspoietic changes. 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 Dr. Tomas is observing this expectantly. His PNH screen is now normal. Following normalization of his platelets with Promacta, he became markedly thrombocytopenic. Marrow showed decreased megakaryocytes. 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 We opted to treat with rabbit ATG, Sandimmune and prednisone in attempt to regain remission.  He appears to be responding well and is now on low dose Sandimmune maintenance. Antifunga/herpes/PCP prophylaxis has been held.\par \par Plan:\par Sandimmune 50 mg bid\par Anticoagulation with ASA for PAF as per Cardiology. Platelet count is adequate.\par Cyclosporine, CMP, LDH, Mg\par RTC in 1 month

## 2019-10-15 NOTE — REVIEW OF SYSTEMS
[Joint Pain] : joint pain [Negative] : Allergic/Immunologic [Difficulty Walking] : difficulty walking [de-identified] : HAs

## 2019-10-15 NOTE — RESULTS/DATA
[FreeTextEntry1] : WBC 3,400, Hgb 16.0, Hct 47.3, , Plts 99,000, Diff normal, ANC 2,300.\par \par 09/13/19\par CMP: Sodium 146, Glu 119, BUN 65, Creatinine 2.54, Globulin 2.5, ALK Phos 123, eGFR 23\par \par Magnesium 2.5\par Cyclosporine 89\par  \par 08/16/19\par CMP: Glu 131, BUN 65, Creatinine 2.20, Globulin 2.6, ALK Phos 132, eGFR 27\par \par Magnesium 2.5\par Cyclosporine 310\par U/S Duplex Lower Extremity:No evidence of right lower extremity deep venous thrombosis.

## 2019-10-15 NOTE — PHYSICAL EXAM
[Normal] : affect appropriate [Ambulatory and capable of all self care but unable to carry out any work activities] : Status 2- Ambulatory and capable of all self care but unable to carry out any work activities. Up and about more than 50% of waking hours [de-identified] : AICD left anterior chest wall. [de-identified] : 2+ edema half to knees bilaterally. No cords. [de-identified] : Senile purpura on arms with brawny changes. Resolving bruise under right nipple.

## 2019-10-23 ENCOUNTER — CHART COPY (OUTPATIENT)
Age: 84
End: 2019-10-23

## 2019-11-05 ENCOUNTER — OUTPATIENT (OUTPATIENT)
Dept: OUTPATIENT SERVICES | Facility: HOSPITAL | Age: 84
LOS: 1 days | Discharge: HOME | End: 2019-11-05

## 2019-11-05 DIAGNOSIS — D59.5: ICD-10-CM

## 2019-11-05 DIAGNOSIS — Z98.890 OTHER SPECIFIED POSTPROCEDURAL STATES: Chronic | ICD-10-CM

## 2019-11-05 DIAGNOSIS — Z95.810 PRESENCE OF AUTOMATIC (IMPLANTABLE) CARDIAC DEFIBRILLATOR: Chronic | ICD-10-CM

## 2019-11-05 DIAGNOSIS — D61.9 APLASTIC ANEMIA, UNSPECIFIED: ICD-10-CM

## 2019-11-15 ENCOUNTER — RESULT REVIEW (OUTPATIENT)
Age: 84
End: 2019-11-15

## 2019-11-15 ENCOUNTER — APPOINTMENT (OUTPATIENT)
Dept: HEMATOLOGY ONCOLOGY | Facility: CLINIC | Age: 84
End: 2019-11-15
Payer: MEDICARE

## 2019-11-15 VITALS
OXYGEN SATURATION: 97 % | SYSTOLIC BLOOD PRESSURE: 119 MMHG | HEART RATE: 97 BPM | RESPIRATION RATE: 17 BRPM | DIASTOLIC BLOOD PRESSURE: 79 MMHG | BODY MASS INDEX: 28.46 KG/M2 | WEIGHT: 227.73 LBS

## 2019-11-15 LAB
BASOPHILS # BLD AUTO: 0 K/UL — SIGNIFICANT CHANGE UP (ref 0–0.2)
BASOPHILS NFR BLD AUTO: 0.5 % — SIGNIFICANT CHANGE UP (ref 0–2)
EOSINOPHIL # BLD AUTO: 0.1 K/UL — SIGNIFICANT CHANGE UP (ref 0–0.5)
EOSINOPHIL NFR BLD AUTO: 3 % — SIGNIFICANT CHANGE UP (ref 0–6)
HCT VFR BLD CALC: 45 % — SIGNIFICANT CHANGE UP (ref 39–50)
HGB BLD-MCNC: 15 G/DL — SIGNIFICANT CHANGE UP (ref 13–17)
LYMPHOCYTES # BLD AUTO: 0.4 K/UL — LOW (ref 1–3.3)
LYMPHOCYTES # BLD AUTO: 13.2 % — SIGNIFICANT CHANGE UP (ref 13–44)
MCHC RBC-ENTMCNC: 33.4 G/DL — SIGNIFICANT CHANGE UP (ref 32–36)
MCHC RBC-ENTMCNC: 33.4 PG — SIGNIFICANT CHANGE UP (ref 27–34)
MCV RBC AUTO: 99.9 FL — SIGNIFICANT CHANGE UP (ref 80–100)
MONOCYTES # BLD AUTO: 0.3 K/UL — SIGNIFICANT CHANGE UP (ref 0–0.9)
MONOCYTES NFR BLD AUTO: 10 % — SIGNIFICANT CHANGE UP (ref 2–14)
NEUTROPHILS # BLD AUTO: 2.4 K/UL — SIGNIFICANT CHANGE UP (ref 1.8–7.4)
NEUTROPHILS NFR BLD AUTO: 73.4 % — SIGNIFICANT CHANGE UP (ref 43–77)
PLATELET # BLD AUTO: 78 K/UL — LOW (ref 150–400)
RBC # BLD: 4.5 M/UL — SIGNIFICANT CHANGE UP (ref 4.2–5.8)
RBC # FLD: 15.4 % — HIGH (ref 10.3–14.5)
WBC # BLD: 3.2 K/UL — LOW (ref 3.8–10.5)
WBC # FLD AUTO: 3.2 K/UL — LOW (ref 3.8–10.5)

## 2019-11-15 PROCEDURE — 99214 OFFICE O/P EST MOD 30 MIN: CPT

## 2019-11-15 NOTE — HISTORY OF PRESENT ILLNESS
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 Feels tired, but less. Problem with balance persists. He has fallen once since last visit - tripped. Chronic barometric headaches persist. Chronic neck pain persists. Intermittent pain, numbness and swelling in his hands. Decreased appetite. No other complaints. He notes no fever, bleeding, jaundice, hematuria, dark urine, visual problems, chest pain, SOB, abdominal pain, melena, BRBPR. Weight has been stable. Received Flu vaccine 2019.  [FreeTextEntry1] : 7/2013 -- 6/2014 Sandimmune/Prednisone;  7/2015- 10/15 Sandimmune; 10/30/15 Eltrombopag; 12/18 Rabbit ATG/Sandimmune/prednisone

## 2019-11-15 NOTE — ADDENDUM
[FreeTextEntry1] : Documented by Keaton Roach acting as a scribe for Dr. Jad Yeager on 11/15/2019 \par \par All medical record entries made by the Scribe were at my, Dr. Jad Yeager's, direction and personally dictated by me on 11/15/2019. I have reviewed the chart and agree that the record accurately reflects my personal performance of the history, physical exam, results, assessment and plan. I have also personally directed, reviewed, and agree with the discharge instructions.

## 2019-11-15 NOTE — PHYSICAL EXAM
[Ambulatory and capable of all self care but unable to carry out any work activities] : Status 2- Ambulatory and capable of all self care but unable to carry out any work activities. Up and about more than 50% of waking hours [Normal] : affect appropriate [de-identified] : 4+ edema to knees bilaterally, L>R. No cords. [de-identified] : AICD left anterior chest wall. [de-identified] : Senile purpura on arms with brawny changes.

## 2019-11-15 NOTE — ASSESSMENT
[Palliative Care Plan] : not applicable at this time [FreeTextEntry1] : 84 year old male with aplastic anemia plus a PNH subpopulation. Last marrow suggestive of dyspoietic changes. 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 Dr. Tomas is observing this expectantly. His PNH screen is now normal. Following normalization of his platelets with Promacta, he became markedly thrombocytopenic. Marrow showed decreased megakaryocytes. 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 We opted to treat with rabbit ATG, Sandimmune and prednisone in attempt to regain remission.  He appears to be responding well and is now on low dose Sandimmune maintenance. Antifungal/herpes/PCP prophylaxis has been held.\par \par Plan:\par Sandimmune 50 mg bid\par Anticoagulation with ASA for PAF as per Cardiology. Platelet count is adequate.\par Cyclosporine, CMP, LDH, Mg\par RTC in 1 month

## 2019-11-15 NOTE — REVIEW OF SYSTEMS
[Joint Pain] : joint pain [Difficulty Walking] : difficulty walking [Negative] : Allergic/Immunologic [de-identified] : HAs

## 2019-11-15 NOTE — RESULTS/DATA
[FreeTextEntry1] : WBC 3,200, Hgb 15, Hct 45, MCV 99.9, Plts 78,000, Diff normal, ANC 2,400.\par \par 10/15/19\par CMP: Glu 142, BUN 59, Creatinine 2.19, Globulin 1.5, eGFR 27\par \par Magnesium 2.4\par Cyclosporine 64

## 2019-11-26 ENCOUNTER — MEDICATION RENEWAL (OUTPATIENT)
Age: 84
End: 2019-11-26

## 2019-12-10 ENCOUNTER — OUTPATIENT (OUTPATIENT)
Dept: OUTPATIENT SERVICES | Facility: HOSPITAL | Age: 84
LOS: 1 days | Discharge: HOME | End: 2019-12-10

## 2019-12-10 DIAGNOSIS — Z98.890 OTHER SPECIFIED POSTPROCEDURAL STATES: Chronic | ICD-10-CM

## 2019-12-10 DIAGNOSIS — D59.5: ICD-10-CM

## 2019-12-10 DIAGNOSIS — D61.9 APLASTIC ANEMIA, UNSPECIFIED: ICD-10-CM

## 2019-12-10 DIAGNOSIS — Z95.810 PRESENCE OF AUTOMATIC (IMPLANTABLE) CARDIAC DEFIBRILLATOR: Chronic | ICD-10-CM

## 2019-12-16 ENCOUNTER — CLINICAL ADVICE (OUTPATIENT)
Age: 84
End: 2019-12-16

## 2019-12-17 ENCOUNTER — APPOINTMENT (OUTPATIENT)
Dept: HEMATOLOGY ONCOLOGY | Facility: CLINIC | Age: 84
End: 2019-12-17
Payer: MEDICARE

## 2019-12-17 ENCOUNTER — RESULT REVIEW (OUTPATIENT)
Age: 84
End: 2019-12-17

## 2019-12-17 VITALS
BODY MASS INDEX: 28.66 KG/M2 | RESPIRATION RATE: 17 BRPM | DIASTOLIC BLOOD PRESSURE: 75 MMHG | SYSTOLIC BLOOD PRESSURE: 117 MMHG | TEMPERATURE: 97.4 F | WEIGHT: 229.28 LBS | HEART RATE: 94 BPM | OXYGEN SATURATION: 96 %

## 2019-12-17 LAB
BASOPHILS NFR BLD AUTO: 2 % — SIGNIFICANT CHANGE UP (ref 0–2)
EOSINOPHIL NFR BLD AUTO: 2 % — SIGNIFICANT CHANGE UP (ref 0–6)
GIANT PLATELETS BLD QL SMEAR: PRESENT — SIGNIFICANT CHANGE UP
HCT VFR BLD CALC: 46.2 % — SIGNIFICANT CHANGE UP (ref 39–50)
HGB BLD-MCNC: 15.1 G/DL — SIGNIFICANT CHANGE UP (ref 13–17)
LG PLATELETS BLD QL AUTO: SLIGHT — SIGNIFICANT CHANGE UP
LYMPHOCYTES # BLD AUTO: 0.4 K/UL — LOW (ref 1–3.3)
LYMPHOCYTES # BLD AUTO: 8 % — LOW (ref 13–44)
MCHC RBC-ENTMCNC: 32.7 G/DL — SIGNIFICANT CHANGE UP (ref 32–36)
MCHC RBC-ENTMCNC: 33.1 PG — SIGNIFICANT CHANGE UP (ref 27–34)
MCV RBC AUTO: 101 FL — HIGH (ref 80–100)
MONOCYTES # BLD AUTO: 0.3 K/UL — SIGNIFICANT CHANGE UP (ref 0–0.9)
MONOCYTES NFR BLD AUTO: 12 % — SIGNIFICANT CHANGE UP (ref 2–14)
NEUTROPHILS # BLD AUTO: 2.1 K/UL — SIGNIFICANT CHANGE UP (ref 1.8–7.4)
NEUTROPHILS NFR BLD AUTO: 76 % — SIGNIFICANT CHANGE UP (ref 43–77)
PLAT MORPH BLD: ABNORMAL
PLATELET # BLD AUTO: 79 K/UL — LOW (ref 150–400)
RBC # BLD: 4.56 M/UL — SIGNIFICANT CHANGE UP (ref 4.2–5.8)
RBC # FLD: 15.9 % — HIGH (ref 10.3–14.5)
RBC BLD AUTO: SIGNIFICANT CHANGE UP
WBC # BLD: 2.9 K/UL — LOW (ref 3.8–10.5)
WBC # FLD AUTO: 2.9 K/UL — LOW (ref 3.8–10.5)

## 2019-12-17 PROCEDURE — 99214 OFFICE O/P EST MOD 30 MIN: CPT

## 2019-12-17 NOTE — ADDENDUM
[FreeTextEntry1] : Documented by Keaton Roach acting as a scribe for Dr. Jad Yeager on 12/17/2019 \par \par All medical record entries made by the Scribe were at my, Dr. Jad Yeager's, direction and personally dictated by me on 12/17/2019. I have reviewed the chart and agree that the record accurately reflects my personal performance of the history, physical exam, results, assessment and plan. I have also personally directed, reviewed, and agree with the discharge instructions.

## 2019-12-17 NOTE — PHYSICAL EXAM
[Ambulatory and capable of all self care but unable to carry out any work activities] : Status 2- Ambulatory and capable of all self care but unable to carry out any work activities. Up and about more than 50% of waking hours [Normal] : affect appropriate [de-identified] : AICD left anterior chest wall. [de-identified] : 4+ edema 2/3 to knees bilaterally, L>R. No cords. [de-identified] : Senile purpura on arms with brawny changes.

## 2019-12-17 NOTE — RESULTS/DATA
[FreeTextEntry1] : WBC 2,900, Hgb 15.1, Hct 46.2, , Plts 79,000, Diff  76 P, 8 L, 12 M, 2 Eos, 2 Ba, ANC 2,100\par \par 11/15/19\par CMP: Sodium 146, Glu 131, BUN 49, Creatinine 2.06, Globulin 2.7, eGFR 29\par \par Cyclosporine level: 57\par Phosphorous 3.7\par Magnesium 2.4

## 2019-12-17 NOTE — CONSULT LETTER
[Dear  ___] : Dear  [unfilled], [Please see my note below.] : Please see my note below. [Courtesy Letter:] : I had the pleasure of seeing your patient, [unfilled], in my office today. [Sincerely,] : Sincerely, [DrDouglas  ___] : Dr. ALMEIDA [DrDouglas ___] : Dr. ALMEIDA [FreeTextEntry2] : Venkat Lopez MD [FreeTextEntry3] : Jad Yeager MD

## 2019-12-17 NOTE — ASSESSMENT
[Palliative Care Plan] : not applicable at this time [FreeTextEntry1] : 84 year old male with aplastic anemia plus a PNH subpopulation. Last marrow suggestive of dyspoietic changes. 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 Dr. Tomas is observing this expectantly. His PNH screen is now normal. Following normalization of his platelets with Promacta, he became markedly thrombocytopenic. Marrow showed decreased megakaryocytes. 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 We opted to treat with rabbit ATG, Sandimmune and prednisone in attempt to regain remission.  He appears to be responding well and is now on low dose Sandimmune maintenance. Antifungal/herpes/PCP prophylaxis has been held.\par \par Plan:\par Sandimmune 50 mg bid\par Anticoagulation with ASA for PAF as per Cardiology. Platelet count is adequate.\par CMP, LDH, Mg, Cyclosporine level, Phosphorus.\par RTC in 1 month

## 2019-12-17 NOTE — REVIEW OF SYSTEMS
[Joint Pain] : joint pain [Difficulty Walking] : difficulty walking [Negative] : Heme/Lymph [Recent Change In Weight] : ~T recent weight change [FreeTextEntry2] : weight gain [de-identified] : HAs

## 2019-12-17 NOTE — HISTORY OF PRESENT ILLNESS
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 Feels tired. No recent falls. Chronic barometric headaches persist. Chronic neck pain persists. Intermittent pain, numbness and swelling in his hands stable. Improved appetite. Reports increased eructation following meals or Sandimmune ingestion. Mild bruising. No other complaints. He notes no fever, bleeding, jaundice, hematuria, dark urine, visual problems, chest pain, SOB, abdominal pain, melena, BRBPR. Reports weight gain.

## 2019-12-24 ENCOUNTER — OTHER (OUTPATIENT)
Age: 84
End: 2019-12-24

## 2020-01-01 ENCOUNTER — RESULT REVIEW (OUTPATIENT)
Age: 85
End: 2020-01-01

## 2020-01-01 ENCOUNTER — OUTPATIENT (OUTPATIENT)
Dept: OUTPATIENT SERVICES | Facility: HOSPITAL | Age: 85
LOS: 1 days | Discharge: HOME | End: 2020-01-01

## 2020-01-01 ENCOUNTER — APPOINTMENT (OUTPATIENT)
Dept: HEMATOLOGY ONCOLOGY | Facility: CLINIC | Age: 85
End: 2020-01-01
Payer: MEDICARE

## 2020-01-01 ENCOUNTER — LABORATORY RESULT (OUTPATIENT)
Age: 85
End: 2020-01-01

## 2020-01-01 ENCOUNTER — APPOINTMENT (OUTPATIENT)
Dept: HEMATOLOGY ONCOLOGY | Facility: CLINIC | Age: 85
End: 2020-01-01

## 2020-01-01 VITALS
SYSTOLIC BLOOD PRESSURE: 118 MMHG | TEMPERATURE: 97.8 F | OXYGEN SATURATION: 98 % | WEIGHT: 231.04 LBS | BODY MASS INDEX: 28.88 KG/M2 | RESPIRATION RATE: 17 BRPM | DIASTOLIC BLOOD PRESSURE: 77 MMHG | HEART RATE: 92 BPM

## 2020-01-01 VITALS
TEMPERATURE: 96.4 F | OXYGEN SATURATION: 99 % | WEIGHT: 220.99 LBS | BODY MASS INDEX: 28.36 KG/M2 | SYSTOLIC BLOOD PRESSURE: 120 MMHG | RESPIRATION RATE: 14 BRPM | DIASTOLIC BLOOD PRESSURE: 70 MMHG | HEART RATE: 96 BPM | HEIGHT: 74 IN

## 2020-01-01 VITALS
BODY MASS INDEX: 30 KG/M2 | HEART RATE: 86 BPM | OXYGEN SATURATION: 97 % | TEMPERATURE: 97.6 F | DIASTOLIC BLOOD PRESSURE: 72 MMHG | RESPIRATION RATE: 16 BRPM | WEIGHT: 240 LBS | SYSTOLIC BLOOD PRESSURE: 118 MMHG

## 2020-01-01 DIAGNOSIS — Z95.810 PRESENCE OF AUTOMATIC (IMPLANTABLE) CARDIAC DEFIBRILLATOR: Chronic | ICD-10-CM

## 2020-01-01 DIAGNOSIS — D59.5: ICD-10-CM

## 2020-01-01 DIAGNOSIS — Z98.890 OTHER SPECIFIED POSTPROCEDURAL STATES: Chronic | ICD-10-CM

## 2020-01-01 DIAGNOSIS — D61.9 APLASTIC ANEMIA, UNSPECIFIED: ICD-10-CM

## 2020-01-01 DIAGNOSIS — D3A.8 OTHER BENIGN NEUROENDOCRINE TUMORS: ICD-10-CM

## 2020-01-01 LAB
ALBUMIN SERPL ELPH-MCNC: 3.9 G/DL
ALBUMIN SERPL ELPH-MCNC: 3.9 G/DL
ALBUMIN SERPL ELPH-MCNC: 4 G/DL
ALP BLD-CCNC: 128 U/L
ALP BLD-CCNC: 144 U/L
ALP BLD-CCNC: 153 U/L
ALT SERPL-CCNC: 15 U/L
ALT SERPL-CCNC: 18 U/L
ALT SERPL-CCNC: 18 U/L
ANION GAP SERPL CALC-SCNC: 12 MMOL/L
ANION GAP SERPL CALC-SCNC: 13 MMOL/L
ANION GAP SERPL CALC-SCNC: 14 MMOL/L
AST SERPL-CCNC: 25 U/L
AST SERPL-CCNC: 25 U/L
AST SERPL-CCNC: 27 U/L
BASOPHILS # BLD AUTO: 0.09 K/UL — SIGNIFICANT CHANGE UP (ref 0–0.2)
BASOPHILS # BLD AUTO: 0.11 K/UL — SIGNIFICANT CHANGE UP (ref 0–0.2)
BASOPHILS # BLD AUTO: 0.16 K/UL — SIGNIFICANT CHANGE UP (ref 0–0.2)
BASOPHILS NFR BLD AUTO: 2.6 % — HIGH (ref 0–2)
BASOPHILS NFR BLD AUTO: 2.7 % — HIGH (ref 0–2)
BASOPHILS NFR BLD AUTO: 5 % — HIGH (ref 0–2)
BILIRUB SERPL-MCNC: 1.1 MG/DL
BILIRUB SERPL-MCNC: 1.2 MG/DL
BILIRUB SERPL-MCNC: 1.5 MG/DL
BUN SERPL-MCNC: 49 MG/DL
BUN SERPL-MCNC: 53 MG/DL
BUN SERPL-MCNC: 56 MG/DL
CALCIUM SERPL-MCNC: 8.8 MG/DL
CALCIUM SERPL-MCNC: 9.5 MG/DL
CALCIUM SERPL-MCNC: 9.6 MG/DL
CHLORIDE SERPL-SCNC: 101 MMOL/L
CHLORIDE SERPL-SCNC: 102 MMOL/L
CHLORIDE SERPL-SCNC: 104 MMOL/L
CO2 SERPL-SCNC: 28 MMOL/L
CO2 SERPL-SCNC: 29 MMOL/L
CO2 SERPL-SCNC: 30 MMOL/L
CREAT SERPL-MCNC: 1.74 MG/DL
CREAT SERPL-MCNC: 1.92 MG/DL
CREAT SERPL-MCNC: 2.07 MG/DL
CYCLOSPORINE SER-MCNC: 51 NG/ML
CYCLOSPORINE SER-MCNC: 56 NG/ML
CYCLOSPORINE SER-MCNC: 60 NG/ML
EOSINOPHIL # BLD AUTO: 0.03 K/UL — SIGNIFICANT CHANGE UP (ref 0–0.5)
EOSINOPHIL # BLD AUTO: 0.05 K/UL — SIGNIFICANT CHANGE UP (ref 0–0.5)
EOSINOPHIL # BLD AUTO: 0.12 K/UL — SIGNIFICANT CHANGE UP (ref 0–0.5)
EOSINOPHIL NFR BLD AUTO: 1 % — SIGNIFICANT CHANGE UP (ref 0–6)
EOSINOPHIL NFR BLD AUTO: 1.5 % — SIGNIFICANT CHANGE UP (ref 0–6)
EOSINOPHIL NFR BLD AUTO: 3 % — SIGNIFICANT CHANGE UP (ref 0–6)
FERRITIN SERPL-MCNC: 160 NG/ML
GLUCOSE SERPL-MCNC: 112 MG/DL
GLUCOSE SERPL-MCNC: 123 MG/DL
GLUCOSE SERPL-MCNC: 126 MG/DL
HCT VFR BLD CALC: 41.9 % — SIGNIFICANT CHANGE UP (ref 39–50)
HCT VFR BLD CALC: 44.5 % — SIGNIFICANT CHANGE UP (ref 39–50)
HCT VFR BLD CALC: 44.6 % — SIGNIFICANT CHANGE UP (ref 39–50)
HGB BLD-MCNC: 13.3 G/DL — SIGNIFICANT CHANGE UP (ref 13–17)
HGB BLD-MCNC: 14.2 G/DL — SIGNIFICANT CHANGE UP (ref 13–17)
HGB BLD-MCNC: 14.5 G/DL — SIGNIFICANT CHANGE UP (ref 13–17)
IMM GRANULOCYTES NFR BLD AUTO: 0.2 % — SIGNIFICANT CHANGE UP (ref 0–1.5)
IMM GRANULOCYTES NFR BLD AUTO: 0.6 % — SIGNIFICANT CHANGE UP (ref 0–1.5)
IRON SATN MFR SERPL: 22 %
IRON SERPL-MCNC: 50 UG/DL
LDH SERPL-CCNC: 188 U/L
LDH SERPL-CCNC: 189 U/L
LDH SERPL-CCNC: 219 U/L
LYMPHOCYTES # BLD AUTO: 0.19 K/UL — LOW (ref 1–3.3)
LYMPHOCYTES # BLD AUTO: 0.31 K/UL — LOW (ref 1–3.3)
LYMPHOCYTES # BLD AUTO: 0.34 K/UL — LOW (ref 1–3.3)
LYMPHOCYTES # BLD AUTO: 10 % — LOW (ref 13–44)
LYMPHOCYTES # BLD AUTO: 6 % — LOW (ref 13–44)
LYMPHOCYTES # BLD AUTO: 7.7 % — LOW (ref 13–44)
MAGNESIUM SERPL-MCNC: 2.3 MG/DL
MAGNESIUM SERPL-MCNC: 2.3 MG/DL
MCHC RBC-ENTMCNC: 31.7 GM/DL — LOW (ref 32–36)
MCHC RBC-ENTMCNC: 31.9 G/DL — LOW (ref 32–36)
MCHC RBC-ENTMCNC: 32.5 GM/DL — SIGNIFICANT CHANGE UP (ref 32–36)
MCHC RBC-ENTMCNC: 32.7 PG — SIGNIFICANT CHANGE UP (ref 27–34)
MCHC RBC-ENTMCNC: 33.2 PG — SIGNIFICANT CHANGE UP (ref 27–34)
MCHC RBC-ENTMCNC: 33.5 PG — SIGNIFICANT CHANGE UP (ref 27–34)
MCV RBC AUTO: 100.7 FL — HIGH (ref 80–100)
MCV RBC AUTO: 104 FL — HIGH (ref 80–100)
MCV RBC AUTO: 105.5 FL — HIGH (ref 80–100)
MONOCYTES # BLD AUTO: 0.29 K/UL — SIGNIFICANT CHANGE UP (ref 0–0.9)
MONOCYTES # BLD AUTO: 0.31 K/UL — SIGNIFICANT CHANGE UP (ref 0–0.9)
MONOCYTES # BLD AUTO: 0.39 K/UL — SIGNIFICANT CHANGE UP (ref 0–0.9)
MONOCYTES NFR BLD AUTO: 11.5 % — SIGNIFICANT CHANGE UP (ref 2–14)
MONOCYTES NFR BLD AUTO: 7.7 % — SIGNIFICANT CHANGE UP (ref 2–14)
MONOCYTES NFR BLD AUTO: 9 % — SIGNIFICANT CHANGE UP (ref 2–14)
NEUTROPHILS # BLD AUTO: 2.51 K/UL — SIGNIFICANT CHANGE UP (ref 1.8–7.4)
NEUTROPHILS # BLD AUTO: 2.56 K/UL — SIGNIFICANT CHANGE UP (ref 1.8–7.4)
NEUTROPHILS # BLD AUTO: 3.16 K/UL — SIGNIFICANT CHANGE UP (ref 1.8–7.4)
NEUTROPHILS NFR BLD AUTO: 73.8 % — SIGNIFICANT CHANGE UP (ref 43–77)
NEUTROPHILS NFR BLD AUTO: 78.7 % — HIGH (ref 43–77)
NEUTROPHILS NFR BLD AUTO: 79 % — HIGH (ref 43–77)
NRBC # BLD: 0 /100 WBCS — SIGNIFICANT CHANGE UP (ref 0–0)
NRBC # BLD: 0 /100 WBCS — SIGNIFICANT CHANGE UP (ref 0–0)
NRBC # BLD: 0 /100 — SIGNIFICANT CHANGE UP (ref 0–0)
NRBC # BLD: SIGNIFICANT CHANGE UP /100 WBCS (ref 0–0)
PHOSPHATE SERPL-MCNC: 3.5 MG/DL
PHOSPHATE SERPL-MCNC: 3.6 MG/DL
PLAT MORPH BLD: NORMAL — SIGNIFICANT CHANGE UP
PLATELET # BLD AUTO: 108 K/UL — LOW (ref 150–400)
PLATELET # BLD AUTO: 109 K/UL — LOW (ref 150–400)
PLATELET # BLD AUTO: 96 K/UL — LOW (ref 150–400)
POTASSIUM SERPL-SCNC: 4.1 MMOL/L
POTASSIUM SERPL-SCNC: 4.4 MMOL/L
POTASSIUM SERPL-SCNC: 4.6 MMOL/L
PROT SERPL-MCNC: 6.3 G/DL
PROT SERPL-MCNC: 6.4 G/DL
PROT SERPL-MCNC: 6.5 G/DL
RBC # BLD: 3.97 M/UL — LOW (ref 4.2–5.8)
RBC # BLD: 4.28 M/UL — SIGNIFICANT CHANGE UP (ref 4.2–5.8)
RBC # BLD: 4.43 M/UL — SIGNIFICANT CHANGE UP (ref 4.2–5.8)
RBC # FLD: 15.8 % — HIGH (ref 10.3–14.5)
RBC # FLD: 17.1 % — HIGH (ref 10.3–14.5)
RBC # FLD: 18.5 % — HIGH (ref 10.3–14.5)
RBC BLD AUTO: SIGNIFICANT CHANGE UP
SARS-COV-2 IGG SERPL IA-ACNC: <3.8 AU/ML
SARS-COV-2 IGG SERPL QL IA: NEGATIVE
SODIUM SERPL-SCNC: 144 MMOL/L
SODIUM SERPL-SCNC: 145 MMOL/L
SODIUM SERPL-SCNC: 145 MMOL/L
TIBC SERPL-MCNC: 228 UG/DL
UIBC SERPL-MCNC: 178 UG/DL
WBC # BLD: 3.24 K/UL — LOW (ref 3.8–10.5)
WBC # BLD: 3.4 K/UL — LOW (ref 3.8–10.5)
WBC # BLD: 4.02 K/UL — SIGNIFICANT CHANGE UP (ref 3.8–10.5)
WBC # FLD AUTO: 3.24 K/UL — LOW (ref 3.8–10.5)
WBC # FLD AUTO: 3.4 K/UL — LOW (ref 3.8–10.5)
WBC # FLD AUTO: 4.02 K/UL — SIGNIFICANT CHANGE UP (ref 3.8–10.5)

## 2020-01-01 PROCEDURE — 99213 OFFICE O/P EST LOW 20 MIN: CPT

## 2020-01-01 PROCEDURE — 99214 OFFICE O/P EST MOD 30 MIN: CPT

## 2020-01-01 RX ORDER — ALPRAZOLAM 0.5 MG/1
0.5 TABLET ORAL
Qty: 30 | Refills: 0 | Status: DISCONTINUED | COMMUNITY
Start: 2019-01-09 | End: 2020-01-01

## 2020-01-01 RX ORDER — NYSTATIN 100000 1/G
100000 POWDER TOPICAL 3 TIMES DAILY
Qty: 1 | Refills: 2 | Status: ACTIVE | COMMUNITY
Start: 2020-01-01 | End: 1900-01-01

## 2020-01-01 RX ORDER — DIPHENHYDRAMINE HCL 25 MG/1
25 TABLET ORAL
Refills: 0 | Status: DISCONTINUED | COMMUNITY
Start: 2019-04-26 | End: 2020-01-01

## 2020-01-01 RX ORDER — DIPHENHYDRAMINE HCL 25 MG/1
25 TABLET ORAL EVERY 6 HOURS
Refills: 0 | Status: ACTIVE | COMMUNITY
Start: 2020-01-01

## 2020-01-13 ENCOUNTER — OUTPATIENT (OUTPATIENT)
Dept: OUTPATIENT SERVICES | Facility: HOSPITAL | Age: 85
LOS: 1 days | Discharge: HOME | End: 2020-01-13

## 2020-01-13 DIAGNOSIS — Z98.890 OTHER SPECIFIED POSTPROCEDURAL STATES: Chronic | ICD-10-CM

## 2020-01-13 DIAGNOSIS — Z95.810 PRESENCE OF AUTOMATIC (IMPLANTABLE) CARDIAC DEFIBRILLATOR: Chronic | ICD-10-CM

## 2020-01-13 DIAGNOSIS — D59.5: ICD-10-CM

## 2020-01-17 ENCOUNTER — RESULT REVIEW (OUTPATIENT)
Age: 85
End: 2020-01-17

## 2020-01-17 ENCOUNTER — APPOINTMENT (OUTPATIENT)
Dept: HEMATOLOGY ONCOLOGY | Facility: CLINIC | Age: 85
End: 2020-01-17
Payer: MEDICARE

## 2020-01-17 VITALS
TEMPERATURE: 97.4 F | HEART RATE: 94 BPM | WEIGHT: 229.28 LBS | RESPIRATION RATE: 17 BRPM | DIASTOLIC BLOOD PRESSURE: 76 MMHG | SYSTOLIC BLOOD PRESSURE: 114 MMHG | OXYGEN SATURATION: 97 % | BODY MASS INDEX: 28.66 KG/M2

## 2020-01-17 LAB
BASOPHILS # BLD AUTO: 0 K/UL — SIGNIFICANT CHANGE UP (ref 0–0.2)
BASOPHILS NFR BLD AUTO: 0.1 % — SIGNIFICANT CHANGE UP (ref 0–2)
EOSINOPHIL # BLD AUTO: 0 K/UL — SIGNIFICANT CHANGE UP (ref 0–0.5)
EOSINOPHIL NFR BLD AUTO: 1.6 % — SIGNIFICANT CHANGE UP (ref 0–6)
HCT VFR BLD CALC: 42.7 % — SIGNIFICANT CHANGE UP (ref 39–50)
HGB BLD-MCNC: 14 G/DL — SIGNIFICANT CHANGE UP (ref 13–17)
LYMPHOCYTES # BLD AUTO: 0.4 K/UL — LOW (ref 1–3.3)
LYMPHOCYTES # BLD AUTO: 13.3 % — SIGNIFICANT CHANGE UP (ref 13–44)
MCHC RBC-ENTMCNC: 32.9 G/DL — SIGNIFICANT CHANGE UP (ref 32–36)
MCHC RBC-ENTMCNC: 33.6 PG — SIGNIFICANT CHANGE UP (ref 27–34)
MCV RBC AUTO: 102 FL — HIGH (ref 80–100)
MONOCYTES # BLD AUTO: 0.3 K/UL — SIGNIFICANT CHANGE UP (ref 0–0.9)
MONOCYTES NFR BLD AUTO: 12.6 % — SIGNIFICANT CHANGE UP (ref 2–14)
NEUTROPHILS # BLD AUTO: 2 K/UL — SIGNIFICANT CHANGE UP (ref 1.8–7.4)
NEUTROPHILS NFR BLD AUTO: 72.4 % — SIGNIFICANT CHANGE UP (ref 43–77)
PLAT MORPH BLD: NORMAL — SIGNIFICANT CHANGE UP
PLATELET # BLD AUTO: 81 K/UL — LOW (ref 150–400)
RBC # BLD: 4.18 M/UL — LOW (ref 4.2–5.8)
RBC # FLD: 15.7 % — HIGH (ref 10.3–14.5)
RBC BLD AUTO: SIGNIFICANT CHANGE UP
WBC # BLD: 2.8 K/UL — LOW (ref 3.8–10.5)
WBC # FLD AUTO: 2.8 K/UL — LOW (ref 3.8–10.5)

## 2020-01-17 PROCEDURE — 99214 OFFICE O/P EST MOD 30 MIN: CPT

## 2020-01-17 NOTE — ASSESSMENT
[Palliative Care Plan] : not applicable at this time [FreeTextEntry1] : 84 year old male with aplastic anemia plus a PNH subpopulation. Last marrow suggestive of dyspoietic changes. 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 Dr. Tomas is observing this expectantly. His PNH screen is now normal. Following normalization of his platelets with Promacta, he became markedly thrombocytopenic. Marrow showed decreased megakaryocytes. 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 We opted to treat with rabbit ATG, Sandimmune and prednisone in attempt to regain remission.  He appears to be responding well and is now on low dose Sandimmune maintenance. Antifungal/herpes/PCP prophylaxis has been held.\par \par Plan:\par Sandimmune 50 mg bid\par Renew alprazolam 0.5 mg, #30. I-STOP done.\par Anticoagulation with ASA for PAF as per Cardiology. Platelet count is adequate.\par CMP, LDH, Mg, Cyclosporine level, Phosphorus.\par RTC in 5 weeks.

## 2020-01-17 NOTE — ADDENDUM
[FreeTextEntry1] : Documented by Dora Gan acting as a scribe for Dr. Yeager on 01/17/2020\par \par All medical record entries made by the Scribe were at my, Dr. Yeager's, direction and\par personally dictated by me on 01/17/2020. I have reviewed the chart and agree that the record\par accurately reflects my personal performance of the history, physical exam, procedure and imaging.

## 2020-01-17 NOTE — REVIEW OF SYSTEMS
[Joint Pain] : joint pain [Difficulty Walking] : difficulty walking [Negative] : Heme/Lymph [Recent Change In Weight] : ~T no recent weight change [de-identified] : HAs

## 2020-01-17 NOTE — PHYSICAL EXAM
[Ambulatory and capable of all self care but unable to carry out any work activities] : Status 2- Ambulatory and capable of all self care but unable to carry out any work activities. Up and about more than 50% of waking hours [Normal] : affect appropriate [de-identified] : AICD left anterior chest wall. [de-identified] : Senile purpura on arms with brawny changes.  [de-identified] : 2+ edema to left knee. No cords.

## 2020-01-17 NOTE — RESULTS/DATA
[FreeTextEntry1] : WBC 2,800, Hgb 14.0, Hct 42.7, , Plts 81,000, Diff  72 P, 13.3 L, 12.6 M, 1.6 Eos, 0.1 Ba, ANC 2,000\par \par

## 2020-01-17 NOTE — CONSULT LETTER
[Dear  ___] : Dear  [unfilled], [Sincerely,] : Sincerely, [Please see my note below.] : Please see my note below. [Courtesy Letter:] : I had the pleasure of seeing your patient, [unfilled], in my office today. [DrDouglas  ___] : Dr. ALMEIDA [DrDouglas ___] : Dr. ALMEIDA [FreeTextEntry2] : Venkat Lopez MD [FreeTextEntry3] : Jad Yeager MD

## 2020-01-17 NOTE — HISTORY OF PRESENT ILLNESS
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 Feels tired. No recent falls. Tripped once, but no injury, LOC.Chronic barometric headaches persist. Chronic neck pain persists. Intermittent pain, numbness and swelling in his hands stable. Improved appetite. No other complaints. He notes no fever, bruising, bleeding, jaundice, hematuria, dark urine, visual problems, chest pain, SOB, palpation, abdominal pain, melena, BRBPR.

## 2020-02-12 ENCOUNTER — OUTPATIENT (OUTPATIENT)
Dept: OUTPATIENT SERVICES | Facility: HOSPITAL | Age: 85
LOS: 1 days | Discharge: HOME | End: 2020-02-12

## 2020-02-12 DIAGNOSIS — Z98.890 OTHER SPECIFIED POSTPROCEDURAL STATES: Chronic | ICD-10-CM

## 2020-02-12 DIAGNOSIS — D59.5: ICD-10-CM

## 2020-02-12 DIAGNOSIS — Z95.810 PRESENCE OF AUTOMATIC (IMPLANTABLE) CARDIAC DEFIBRILLATOR: Chronic | ICD-10-CM

## 2020-02-12 DIAGNOSIS — D61.9 APLASTIC ANEMIA, UNSPECIFIED: ICD-10-CM

## 2020-02-18 ENCOUNTER — RESULT REVIEW (OUTPATIENT)
Age: 85
End: 2020-02-18

## 2020-02-18 ENCOUNTER — APPOINTMENT (OUTPATIENT)
Dept: HEMATOLOGY ONCOLOGY | Facility: CLINIC | Age: 85
End: 2020-02-18
Payer: MEDICARE

## 2020-02-18 VITALS
RESPIRATION RATE: 18 BRPM | SYSTOLIC BLOOD PRESSURE: 120 MMHG | HEART RATE: 85 BPM | OXYGEN SATURATION: 97 % | TEMPERATURE: 97.4 F | BODY MASS INDEX: 29.48 KG/M2 | WEIGHT: 235.89 LBS | DIASTOLIC BLOOD PRESSURE: 79 MMHG

## 2020-02-18 DIAGNOSIS — I48.91 UNSPECIFIED ATRIAL FIBRILLATION: ICD-10-CM

## 2020-02-18 LAB
BASOPHILS # BLD AUTO: 0 K/UL — SIGNIFICANT CHANGE UP (ref 0–0.2)
BASOPHILS NFR BLD AUTO: 0 % — SIGNIFICANT CHANGE UP (ref 0–2)
EOSINOPHIL # BLD AUTO: 0.1 K/UL — SIGNIFICANT CHANGE UP (ref 0–0.5)
EOSINOPHIL NFR BLD AUTO: 3.3 % — SIGNIFICANT CHANGE UP (ref 0–6)
HCT VFR BLD CALC: 42.2 % — SIGNIFICANT CHANGE UP (ref 39–50)
HGB BLD-MCNC: 14.2 G/DL — SIGNIFICANT CHANGE UP (ref 13–17)
LYMPHOCYTES # BLD AUTO: 0.4 K/UL — LOW (ref 1–3.3)
LYMPHOCYTES # BLD AUTO: 15.6 % — SIGNIFICANT CHANGE UP (ref 13–44)
MCHC RBC-ENTMCNC: 33.5 G/DL — SIGNIFICANT CHANGE UP (ref 32–36)
MCHC RBC-ENTMCNC: 34.3 PG — HIGH (ref 27–34)
MCV RBC AUTO: 102 FL — HIGH (ref 80–100)
MONOCYTES # BLD AUTO: 0.3 K/UL — SIGNIFICANT CHANGE UP (ref 0–0.9)
MONOCYTES NFR BLD AUTO: 12.5 % — SIGNIFICANT CHANGE UP (ref 2–14)
NEUTROPHILS # BLD AUTO: 1.6 K/UL — LOW (ref 1.8–7.4)
NEUTROPHILS NFR BLD AUTO: 68.7 % — SIGNIFICANT CHANGE UP (ref 43–77)
PLATELET # BLD AUTO: 72 K/UL — LOW (ref 150–400)
RBC # BLD: 4.13 M/UL — LOW (ref 4.2–5.8)
RBC # FLD: 14.7 % — HIGH (ref 10.3–14.5)
WBC # BLD: 2.4 K/UL — LOW (ref 3.8–10.5)
WBC # FLD AUTO: 2.4 K/UL — LOW (ref 3.8–10.5)

## 2020-02-18 PROCEDURE — 99213 OFFICE O/P EST LOW 20 MIN: CPT

## 2020-02-18 NOTE — REVIEW OF SYSTEMS
[Joint Pain] : joint pain [Difficulty Walking] : difficulty walking [Negative] : Allergic/Immunologic [Recent Change In Weight] : ~T no recent weight change [de-identified] : HAs

## 2020-02-18 NOTE — RESULTS/DATA
[FreeTextEntry1] : WBC 2,400, Hgb 14.2, Hct 42.2, .0, Plts 72,000, Diff normal, ANC 1,600\par \par 01/17/20\par CMP: Glu 146, BUN 48, Creatinine 1.80, Globulin 2.3, Total Bilirubin 1.3, ALK phos 145, eGFR 34\par \par Magnesium 2.2\par Cyclosporine 57\par Phosphorus 4.0\par

## 2020-02-18 NOTE — HISTORY OF PRESENT ILLNESS
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 Feels tired. No recent falls. Chronic barometric headaches persist. Chronic neck pain persists. Intermittent pain, numbness and swelling in his hands stable. No other complaints. He notes no fever, visual problems, chest pain, shortness of breath, palpation, abdominal pain, bruising, bleeding, jaundice, hematuria, dark urine, melena, BRBPR.

## 2020-02-18 NOTE — ADDENDUM
[FreeTextEntry1] : Documented by Keaton Roach acting as a scribe for Dr. Jad Yeager on 02/18/2020 \par \par All medical record entries made by the Scribe were at my, Dr. Jad Yeager's, direction and personally dictated by me on 02/18/2020. I have reviewed the chart and agree that the record accurately reflects my personal performance of the history, physical exam, results, assessment and plan. I have also personally directed, reviewed, and agree with the discharge instructions.

## 2020-02-18 NOTE — ASSESSMENT
[Palliative Care Plan] : not applicable at this time [FreeTextEntry1] : 84 year old male with aplastic anemia plus a PNH subpopulation. Last marrow suggestive of dyspoietic changes. 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 Dr. Tomas is observing this expectantly. His PNH screen is now normal. Following normalization of his platelets with Promacta, he became markedly thrombocytopenic. Marrow showed decreased megakaryocytes. 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 We opted to treat with rabbit ATG, Sandimmune and prednisone in attempt to regain remission. He appears to be responding well and is now on low dose Sandimmune maintenance. Antifungal/herpes/PCP prophylaxis has been held.\par \par Plan:\par Sandimmune 50 mg bid\par Anticoagulation with ASA for PAF as per Cardiology. Platelet count is adequate.\par RTC in 4 weeks.

## 2020-03-16 LAB
ALBUMIN SERPL ELPH-MCNC: 4 G/DL
ALP BLD-CCNC: 109 U/L
ALP BLD-CCNC: 113 U/L
ALP BLD-CCNC: 115 U/L
ALP BLD-CCNC: 145 U/L
ALT SERPL-CCNC: 20 U/L
ALT SERPL-CCNC: 21 U/L
ALT SERPL-CCNC: 21 U/L
ALT SERPL-CCNC: 26 U/L
ANION GAP SERPL CALC-SCNC: 13 MMOL/L
ANION GAP SERPL CALC-SCNC: 14 MMOL/L
ANION GAP SERPL CALC-SCNC: 14 MMOL/L
ANION GAP SERPL CALC-SCNC: 15 MMOL/L
AST SERPL-CCNC: 25 U/L
AST SERPL-CCNC: 28 U/L
AST SERPL-CCNC: 30 U/L
AST SERPL-CCNC: 35 U/L
BILIRUB SERPL-MCNC: 1.2 MG/DL
BILIRUB SERPL-MCNC: 1.2 MG/DL
BILIRUB SERPL-MCNC: 1.3 MG/DL
BILIRUB SERPL-MCNC: 1.4 MG/DL
BUN SERPL-MCNC: 48 MG/DL
BUN SERPL-MCNC: 49 MG/DL
BUN SERPL-MCNC: 54 MG/DL
BUN SERPL-MCNC: 59 MG/DL
CALCIUM SERPL-MCNC: 9.4 MG/DL
CALCIUM SERPL-MCNC: 9.4 MG/DL
CALCIUM SERPL-MCNC: 9.7 MG/DL
CALCIUM SERPL-MCNC: 9.8 MG/DL
CHLORIDE SERPL-SCNC: 102 MMOL/L
CHLORIDE SERPL-SCNC: 102 MMOL/L
CHLORIDE SERPL-SCNC: 103 MMOL/L
CHLORIDE SERPL-SCNC: 104 MMOL/L
CO2 SERPL-SCNC: 27 MMOL/L
CO2 SERPL-SCNC: 28 MMOL/L
CO2 SERPL-SCNC: 29 MMOL/L
CO2 SERPL-SCNC: 29 MMOL/L
CREAT SERPL-MCNC: 1.8 MG/DL
CREAT SERPL-MCNC: 2.01 MG/DL
CREAT SERPL-MCNC: 2.06 MG/DL
CREAT SERPL-MCNC: 2.19 MG/DL
CYCLOSPORINE SER-MCNC: 57 NG/ML
CYCLOSPORINE SER-MCNC: 57 NG/ML
CYCLOSPORINE SER-MCNC: 64 NG/ML
CYCLOSPORINE SER-MCNC: 64 NG/ML
GLUCOSE SERPL-MCNC: 131 MG/DL
GLUCOSE SERPL-MCNC: 142 MG/DL
GLUCOSE SERPL-MCNC: 146 MG/DL
GLUCOSE SERPL-MCNC: 154 MG/DL
LDH SERPL-CCNC: 199 U/L
LDH SERPL-CCNC: 208 U/L
LDH SERPL-CCNC: 220 U/L
LDH SERPL-CCNC: 224 U/L
MAGNESIUM SERPL-MCNC: 2.2 MG/DL
MAGNESIUM SERPL-MCNC: 2.4 MG/DL
PHOSPHATE SERPL-MCNC: 3.7 MG/DL
PHOSPHATE SERPL-MCNC: 3.8 MG/DL
PHOSPHATE SERPL-MCNC: 4 MG/DL
POTASSIUM SERPL-SCNC: 4.4 MMOL/L
POTASSIUM SERPL-SCNC: 4.5 MMOL/L
PROT SERPL-MCNC: 6.3 G/DL
PROT SERPL-MCNC: 6.3 G/DL
PROT SERPL-MCNC: 6.5 G/DL
PROT SERPL-MCNC: 6.7 G/DL
SODIUM SERPL-SCNC: 143 MMOL/L
SODIUM SERPL-SCNC: 144 MMOL/L
SODIUM SERPL-SCNC: 146 MMOL/L
SODIUM SERPL-SCNC: 146 MMOL/L

## 2020-03-19 ENCOUNTER — OUTPATIENT (OUTPATIENT)
Dept: OUTPATIENT SERVICES | Facility: HOSPITAL | Age: 85
LOS: 1 days | Discharge: HOME | End: 2020-03-19

## 2020-03-19 DIAGNOSIS — D59.5: ICD-10-CM

## 2020-03-19 DIAGNOSIS — Z95.810 PRESENCE OF AUTOMATIC (IMPLANTABLE) CARDIAC DEFIBRILLATOR: Chronic | ICD-10-CM

## 2020-03-19 DIAGNOSIS — Z98.890 OTHER SPECIFIED POSTPROCEDURAL STATES: Chronic | ICD-10-CM

## 2020-03-25 ENCOUNTER — APPOINTMENT (OUTPATIENT)
Dept: HEMATOLOGY ONCOLOGY | Facility: CLINIC | Age: 85
End: 2020-03-25

## 2020-03-28 NOTE — RESULTS/DATA
[FreeTextEntry1] : WBC 3,400, Hgb 15.1, Hct 45.4, Plts 111,000, Diff  normal,  ANC 2,500\par \par \par \par 07/19/19\par CMP:  Glu 126, BUN 54, Creatinine 2.20, eGFR 27\par \par Mg 2.1\par Cyclosporine: 77\par

## 2020-03-28 NOTE — PHYSICAL EXAM
[Ambulatory and capable of all self care but unable to carry out any work activities] : Status 2- Ambulatory and capable of all self care but unable to carry out any work activities. Up and about more than 50% of waking hours [de-identified] : +3 pedal edema Right, chronic venous stasis bilateral legs,

## 2020-03-28 NOTE — HISTORY OF PRESENT ILLNESS
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   He had a fall two days prior hospitalization.  Had  CT scan of head  showed no acute bleed and x-ray showed no  fracture.  He  was dialyzed  with Lasix.  BP medication was cut to half due to low BP.  He had a fall at home a week ago, hit his left knee and arm into the bed.  He  had a f/u with his cardiologist last week.  His right leg is more swollen than left.  He started a PT at home. He has a visiting nurse coming in twice a week. He is on a low sodium diet. He check his weight every day. He was instructed to double up Torsemide if weight increases more than a pound.  He notes no fever, bleeding, jaundice, hematuria, dark urine, visual problems, chest pain, SOB, abdominal pain, melena, BRBPR. Walks with walker.

## 2020-03-28 NOTE — ASSESSMENT
[Palliative Care Plan] : not applicable at this time [FreeTextEntry1] : 84 year old male with aplastic anemia plus a PNH subpopulation. Last marrow suggestive of dyspoietic changes. 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 Dr. Tomas is observing this expectantly. His PNH screen is now normal. Following normalization of his platelets with Promacta, he became markedly thrombocytopenic. Marrow showed decreased megakaryocytes. 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 We opted to treat with rabbit ATG, Sandimmune and prednisone in attempt to regain remission.  He appears to be responding.\par \par \par Plan:\par Doppler R LL Ext\par Sandimmune 50 mg bid\par Acyclovir/Diflucan\par Hold Bactrim. Consider atovaquone.\par Anticoagulation for PAF as per Cardiology. Platelet count is adequate.\par Cyclosporine, CMP, LDH, Mg\par F/U cardiology, Cr increasing\par ASA 81 mg qd plts >50,000\par RTC in 1 month

## 2020-03-28 NOTE — REVIEW OF SYSTEMS
[Joint Pain] : joint pain [Negative] : Allergic/Immunologic [Fatigue] : fatigue [SOB on Exertion] : shortness of breath during exertion [de-identified] : HAs

## 2020-06-16 NOTE — ASSESSMENT
[Palliative Care Plan] : not applicable at this time [FreeTextEntry1] : 84 year old male with aplastic anemia plus a PNH subpopulation. Last marrow suggestive of dyspoietic changes. 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 Dr. Tomas is observing this expectantly. His PNH screen is now normal. Following normalization of his platelets with Promacta, he became markedly thrombocytopenic. Marrow showed decreased megakaryocytes. 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 We opted to treat with rabbit ATG, Sandimmune and prednisone in attempt to regain remission. He appears to be responding well and is now on low dose Sandimmune maintenance. Antifungal/herpes/PCP prophylaxis has been held.\par \par Plan:\par Sandimmune 50 mg bid\par Anticoagulation with ASA for PAF as per Cardiology. Platelet count is adequate.\par CMP, LDH, CSA level, Mg, PO4\par Repeat labs in 4 weeks\par PI linked antigen next visit\par RTC in 8 weeks. \par

## 2020-06-16 NOTE — HISTORY OF PRESENT ILLNESS
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 Feels tired. No recent falls. Skin tears easily. Bruises easily. Chronic barometric headaches persist. Chronic neck pain persists. Intermittent pain, numbness and swelling in his hands stable. No other complaints. He notes no fever, visual problems, chest pain, shortness of breath, palpitation, abdominal pain, bruising, bleeding, jaundice, hematuria, dark urine, melena, BRBPR.

## 2020-06-16 NOTE — PHYSICAL EXAM
[Ambulatory and capable of all self care but unable to carry out any work activities] : Status 2- Ambulatory and capable of all self care but unable to carry out any work activities. Up and about more than 50% of waking hours [Normal] : affect appropriate [de-identified] : AICD left anterior chest wall. [de-identified] : 4+ edema to knee bilateraly. No cords. [de-identified] : Senile purpura on arms with brawny changes.

## 2020-06-16 NOTE — CONSULT LETTER
[Dear  ___] : Dear  [unfilled], [Please see my note below.] : Please see my note below. [Sincerely,] : Sincerely, [Courtesy Letter:] : I had the pleasure of seeing your patient, [unfilled], in my office today. [DrDouglas  ___] : Dr. ALMEIDA [DrDouglas ___] : Dr. ALMEIDA [FreeTextEntry2] : Venkat Lopez MD [FreeTextEntry3] : Jad Yeager MD

## 2020-06-16 NOTE — REVIEW OF SYSTEMS
[Fatigue] : fatigue [Joint Pain] : joint pain [Skin Wound] : skin wound [Easy Bruising] : a tendency for easy bruising [Negative] : Allergic/Immunologic

## 2020-08-24 NOTE — REVIEW OF SYSTEMS
[Joint Pain] : joint pain [Skin Wound] : skin wound [Easy Bruising] : a tendency for easy bruising [Negative] : Constitutional

## 2020-09-30 NOTE — H&P ADULT - PROBLEM SELECTOR PROBLEM 5
Assessment/Plan:  Anxiety with panic Xanax affective PDMP reviewed no concerns  No evidence of abuse or divergence patient is employed as a respiratory therapist     Chronic low back pain gabapentin and ibuprofen affective    Herpes Valtrex affective    Asthma uses inhaler infrequently    Problem List Items Addressed This Visit     None      Visit Diagnoses     Anxiety    -  Primary    Relevant Medications    ALPRAZolam (XANAX) 1 mg tablet    Neuropathy        Relevant Medications    gabapentin (NEURONTIN) 300 mg capsule    Chronic bilateral low back pain without sciatica        Relevant Medications    ibuprofen (MOTRIN) 800 mg tablet    Herpes        Relevant Medications    valACYclovir (VALTREX) 500 mg tablet    Lipid screening        Relevant Orders    CBC and differential    Comprehensive metabolic panel    Lipid Panel with Direct LDL reflex    Vitamin D deficiency        Relevant Orders    Vitamin D 25 hydroxy           Diagnoses and all orders for this visit:    Anxiety  -     ALPRAZolam (XANAX) 1 mg tablet; Take 1 tablet (1 mg total) by mouth daily as needed for anxiety    Neuropathy  -     gabapentin (NEURONTIN) 300 mg capsule; Take 1 capsule (300 mg total) by mouth 3 (three) times a day    Chronic bilateral low back pain without sciatica  -     ibuprofen (MOTRIN) 800 mg tablet; Take 1 tablet (800 mg total) by mouth 3 (three) times a day    Herpes  -     valACYclovir (VALTREX) 500 mg tablet; Take 1 tablet (500 mg total) by mouth 2 (two) times a day as needed (cold sores) for up to 60 doses    Lipid screening  -     CBC and differential; Future  -     Comprehensive metabolic panel; Future  -     Lipid Panel with Direct LDL reflex; Future    Vitamin D deficiency  -     Vitamin D 25 hydroxy; Future    Other orders  -     albuterol (PROVENTIL HFA,VENTOLIN HFA) 90 mcg/act inhaler;  Inhale 2 puffs every 6 (six) hours as needed  -     albuterol (2 5 mg/3 mL) 0 083 % nebulizer solution; inhale contents of 1 vial ( 3 milliliters ) in nebulizer by mouth and INTO THE LUNGS every 4 hours  -     Discontinue: ALPRAZolam (Xanax) 0 25 mg tablet; Take 1 mg by mouth daily as needed for anxiety   -     cetirizine (ZyrTEC Allergy) 10 mg tablet; Daily  -     Discontinue: gabapentin (NEURONTIN) 300 mg capsule; Take 300 mg by mouth 3 (three) times a day   -     Discontinue: glyBURIDE (DIABETA) 5 mg tablet; Take by mouth  -     Discontinue: ibuprofen (MOTRIN) 800 mg tablet; Take 800 mg by mouth 3 (three) times a day   -     zolpidem (Ambien) 10 mg tablet; Ambien 10 mg tablet   Take 1 tablet as needed by oral route  -     Discontinue: valACYclovir (VALTREX) 500 mg tablet; Take 500 mg by mouth 2 (two) times a day        No problem-specific Assessment & Plan notes found for this encounter  PHQ-9 Depression Screening    PHQ-9:    Frequency of the following problems over the past two weeks: Body mass index is 25 69 kg/m²  BMI Counseling: Body mass index is 25 69 kg/m²  The BMI   Subjective:      Patient ID: Kane Burton is a 63 y o  female  Patient is here for routine checkup      The following portions of the patient's history were reviewed and updated as appropriate:   She has no past medical history on file  ,  does not have a problem list on file  ,   has no past surgical history on file  ,  family history is not on file  ,   reports that she has never smoked  She has never used smokeless tobacco  She reports current alcohol use  She reports that she does not use drugs  ,  is allergic to oxycodone-acetaminophen     Current Outpatient Medications   Medication Sig Dispense Refill    albuterol (2 5 mg/3 mL) 0 083 % nebulizer solution inhale contents of 1 vial ( 3 milliliters ) in nebulizer by mouth and INTO THE LUNGS every 4 hours      albuterol (PROVENTIL HFA,VENTOLIN HFA) 90 mcg/act inhaler Inhale 2 puffs every 6 (six) hours as needed      ALPRAZolam (XANAX) 1 mg tablet Take 1 tablet (1 mg total) by mouth daily as needed for anxiety 30 tablet 5    cetirizine (ZyrTEC Allergy) 10 mg tablet Daily      gabapentin (NEURONTIN) 300 mg capsule Take 1 capsule (300 mg total) by mouth 3 (three) times a day 180 capsule 1    ibuprofen (MOTRIN) 800 mg tablet Take 1 tablet (800 mg total) by mouth 3 (three) times a day 90 tablet 5    valACYclovir (VALTREX) 500 mg tablet Take 1 tablet (500 mg total) by mouth 2 (two) times a day as needed (cold sores) for up to 60 doses 60 tablet 2    zolpidem (Ambien) 10 mg tablet Ambien 10 mg tablet   Take 1 tablet as needed by oral route  No current facility-administered medications for this visit  Review of Systems   Constitutional: Negative  HENT: Negative  Eyes: Negative  Respiratory: Negative  Cardiovascular: Negative  Gastrointestinal: Negative  Endocrine: Negative  Genitourinary: Negative  Musculoskeletal: Positive for back pain  Skin: Negative  Allergic/Immunologic: Negative  Neurological: Negative  Hematological: Negative  Psychiatric/Behavioral: Negative  Objective:    /80   Pulse 68   Temp (!) 97 3 °F (36 3 °C)   Resp 16   Ht 5' 3" (1 6 m)   Wt 65 8 kg (145 lb)   BMI 25 69 kg/m²   Body mass index is 25 69 kg/m²  Physical Exam  Constitutional:       Appearance: She is well-developed  HENT:      Head: Normocephalic  Eyes:      Pupils: Pupils are equal, round, and reactive to light  Neck:      Musculoskeletal: Normal range of motion  Cardiovascular:      Rate and Rhythm: Normal rate and regular rhythm  Heart sounds: Normal heart sounds  Pulmonary:      Effort: Pulmonary effort is normal       Breath sounds: Normal breath sounds  Abdominal:      General: Bowel sounds are normal       Palpations: Abdomen is soft  Tenderness: There is no abdominal tenderness  Skin:     General: Skin is warm  Neurological:      Mental Status: She is alert and oriented to person, place, and time  Chronic atrial fibrillation

## 2020-10-21 PROBLEM — D3A.8 NEUROENDOCRINE TUMOR: Status: ACTIVE | Noted: 2017-10-05

## 2020-10-21 NOTE — REVIEW OF SYSTEMS
[Fatigue] : fatigue [Joint Pain] : joint pain [Difficulty Walking] : difficulty walking [Easy Bruising] : a tendency for easy bruising [Negative] : Allergic/Immunologic

## 2020-10-21 NOTE — CONSULT LETTER
[Dear  ___] : Dear  [unfilled], [Courtesy Letter:] : I had the pleasure of seeing your patient, [unfilled], in my office today. [Please see my note below.] : Please see my note below. [Sincerely,] : Sincerely, [FreeTextEntry2] : Venkat Lopez MD [FreeTextEntry3] : Jad Yeager MD [DrDouglas  ___] : Dr. ALMEIDA [DrDouglas ___] : Dr. ALMEIDA

## 2020-10-21 NOTE — PHYSICAL EXAM
[Capable of only limited self care, confined to bed or chair more than 50% of waking hours] : Status 3- Capable of only limited self care, confined to bed or chair more than 50% of waking hours [Normal] : affect appropriate [de-identified] : AICD left anterior chest wall. [de-identified] : 2+ edema to knee bilateraly. No cords. [de-identified] : Senile purpura on arms with brawny changes. 12 x 6 cm fading bruise on back.

## 2020-10-21 NOTE — ASSESSMENT
[Palliative Care Plan] : not applicable at this time [FreeTextEntry1] : 85 year old male with aplastic anemia plus a PNH subpopulation. Last marrow suggestive of dyspoietic changes. 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 Dr. Tomas is observing this expectantly. His PNH screen is now normal. Following normalization of his platelets with Promacta, he became markedly thrombocytopenic. Marrow showed decreased megakaryocytes. 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 We opted to treat with rabbit ATG, Sandimmune and prednisone in attempt to regain remission. He appears to be responding well and is now on low dose Sandimmune maintenance. Antifungal/herpes/PCP prophylaxis has been held.\par \par Plan:\par Sandimmune 50 mg bid\par Anticoagulation with ASA for PAF as per Cardiology. Platelet count is adequate.\par CMP, LDH, CSA level, Mg, PO4, Fe/TIBC, ferritin\par Flu vaccine this Fall\par RTC in 8 weeks. \par

## 2020-10-21 NOTE — HISTORY OF PRESENT ILLNESS
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 Legs are wobbly. Able to ambulate well with walker. Feels tired. Left heel hurts. Saw podiatrist. Still occasionally falls. Skin tears easily. Bruises easily. Chronic barometric headaches persist. Chronic neck pain persists. Intermittent pain, numbness and swelling in his hands stable. He notes no fever, visual problems, chest pain, shortness of breath, palpitation, abdominal pain, bleeding, jaundice, hematuria, dark urine, melena, BRBPR.

## 2021-01-01 ENCOUNTER — OUTPATIENT (OUTPATIENT)
Dept: OUTPATIENT SERVICES | Facility: HOSPITAL | Age: 86
LOS: 1 days | Discharge: HOME | End: 2021-01-01

## 2021-01-01 ENCOUNTER — RESULT REVIEW (OUTPATIENT)
Age: 86
End: 2021-01-01

## 2021-01-01 ENCOUNTER — APPOINTMENT (OUTPATIENT)
Dept: HEMATOLOGY ONCOLOGY | Facility: CLINIC | Age: 86
End: 2021-01-01
Payer: MEDICARE

## 2021-01-01 ENCOUNTER — APPOINTMENT (OUTPATIENT)
Dept: HEMATOLOGY ONCOLOGY | Facility: CLINIC | Age: 86
End: 2021-01-01

## 2021-01-01 VITALS
HEART RATE: 80 BPM | BODY MASS INDEX: 29.14 KG/M2 | DIASTOLIC BLOOD PRESSURE: 78 MMHG | SYSTOLIC BLOOD PRESSURE: 120 MMHG | HEIGHT: 73.98 IN | OXYGEN SATURATION: 98 % | WEIGHT: 227.05 LBS | TEMPERATURE: 97.3 F | RESPIRATION RATE: 18 BRPM

## 2021-01-01 DIAGNOSIS — D61.9 APLASTIC ANEMIA, UNSPECIFIED: ICD-10-CM

## 2021-01-01 DIAGNOSIS — Z98.890 OTHER SPECIFIED POSTPROCEDURAL STATES: Chronic | ICD-10-CM

## 2021-01-01 DIAGNOSIS — D72.820 LYMPHOCYTOSIS (SYMPTOMATIC): ICD-10-CM

## 2021-01-01 DIAGNOSIS — D59.5: ICD-10-CM

## 2021-01-01 DIAGNOSIS — D50.0 IRON DEFICIENCY ANEMIA SECONDARY TO BLOOD LOSS (CHRONIC): ICD-10-CM

## 2021-01-01 DIAGNOSIS — Z95.810 PRESENCE OF AUTOMATIC (IMPLANTABLE) CARDIAC DEFIBRILLATOR: Chronic | ICD-10-CM

## 2021-01-01 LAB
ALBUMIN SERPL ELPH-MCNC: 4 G/DL
ALP BLD-CCNC: 156 U/L
ALT SERPL-CCNC: 17 U/L
ANION GAP SERPL CALC-SCNC: 11 MMOL/L
AST SERPL-CCNC: 23 U/L
BASOPHILS # BLD AUTO: 0 K/UL — SIGNIFICANT CHANGE UP (ref 0–0.2)
BASOPHILS NFR BLD AUTO: 0 % — SIGNIFICANT CHANGE UP (ref 0–2)
BILIRUB SERPL-MCNC: 0.8 MG/DL
BUN SERPL-MCNC: 55 MG/DL
CALCIUM SERPL-MCNC: 9.6 MG/DL
CHLORIDE SERPL-SCNC: 104 MMOL/L
CO2 SERPL-SCNC: 27 MMOL/L
CREAT SERPL-MCNC: 2.03 MG/DL
CYCLOSPORINE SER-MCNC: 42 NG/ML
EOSINOPHIL # BLD AUTO: 0.06 K/UL — SIGNIFICANT CHANGE UP (ref 0–0.5)
EOSINOPHIL NFR BLD AUTO: 2 % — SIGNIFICANT CHANGE UP (ref 0–6)
FERRITIN SERPL-MCNC: 126 NG/ML
GLUCOSE SERPL-MCNC: 108 MG/DL
HCT VFR BLD CALC: 39.7 % — SIGNIFICANT CHANGE UP (ref 39–50)
HGB BLD-MCNC: 12.3 G/DL — LOW (ref 13–17)
IRON SATN MFR SERPL: 20 %
IRON SERPL-MCNC: 55 UG/DL
LDH SERPL-CCNC: 153 U/L
LYMPHOCYTES # BLD AUTO: 0.28 K/UL — LOW (ref 1–3.3)
LYMPHOCYTES # BLD AUTO: 10 % — LOW (ref 13–44)
MAGNESIUM SERPL-MCNC: 2.3 MG/DL
MCHC RBC-ENTMCNC: 31 G/DL — LOW (ref 32–36)
MCHC RBC-ENTMCNC: 32 PG — SIGNIFICANT CHANGE UP (ref 27–34)
MCV RBC AUTO: 103.4 FL — HIGH (ref 80–100)
MONOCYTES # BLD AUTO: 0.14 K/UL — SIGNIFICANT CHANGE UP (ref 0–0.9)
MONOCYTES NFR BLD AUTO: 5 % — SIGNIFICANT CHANGE UP (ref 2–14)
NEUTROPHILS # BLD AUTO: 2.31 K/UL — SIGNIFICANT CHANGE UP (ref 1.8–7.4)
NEUTROPHILS NFR BLD AUTO: 83 % — HIGH (ref 43–77)
NRBC # BLD: 0 /100 — SIGNIFICANT CHANGE UP (ref 0–0)
NRBC # BLD: SIGNIFICANT CHANGE UP /100 WBCS (ref 0–0)
PHOSPHATE SERPL-MCNC: 3.8 MG/DL
PLAT MORPH BLD: NORMAL — SIGNIFICANT CHANGE UP
PLATELET # BLD AUTO: 103 K/UL — LOW (ref 150–400)
POTASSIUM SERPL-SCNC: 4.4 MMOL/L
PROT SERPL-MCNC: 6.6 G/DL
RBC # BLD: 3.84 M/UL — LOW (ref 4.2–5.8)
RBC # FLD: 16.5 % — HIGH (ref 10.3–14.5)
RBC BLD AUTO: SIGNIFICANT CHANGE UP
SODIUM SERPL-SCNC: 143 MMOL/L
TIBC SERPL-MCNC: 269 UG/DL
UIBC SERPL-MCNC: 215 UG/DL
WBC # BLD: 2.78 K/UL — LOW (ref 3.8–10.5)
WBC # FLD AUTO: 2.78 K/UL — LOW (ref 3.8–10.5)

## 2021-01-01 PROCEDURE — 99214 OFFICE O/P EST MOD 30 MIN: CPT

## 2021-01-01 PROCEDURE — 99072 ADDL SUPL MATRL&STAF TM PHE: CPT

## 2021-01-01 RX ORDER — CYCLOSPORINE 100 MG/ML
100 SOLUTION ORAL
Qty: 1 | Refills: 5 | Status: ACTIVE | COMMUNITY
Start: 2019-09-16 | End: 1900-01-01

## 2021-01-01 RX ORDER — NYSTATIN 100000 1/G
100000 POWDER TOPICAL 3 TIMES DAILY
Qty: 1 | Refills: 2 | Status: ACTIVE | COMMUNITY
Start: 2021-01-01 | End: 1900-01-01

## 2021-01-01 RX ORDER — NYSTATIN 100000 1/G
100000 POWDER TOPICAL
Qty: 1 | Refills: 5 | Status: DISCONTINUED | COMMUNITY
Start: 2019-09-13 | End: 2021-01-01

## 2021-01-01 RX ORDER — ALPRAZOLAM 0.5 MG/1
0.5 TABLET ORAL
Qty: 30 | Refills: 0 | Status: ACTIVE | COMMUNITY
Start: 2020-01-01 | End: 1900-01-01

## 2021-01-01 RX ORDER — MULTIVITAMIN
CAPSULE ORAL
Qty: 30 | Refills: 5 | Status: ACTIVE | COMMUNITY
Start: 2017-11-17

## 2021-01-18 NOTE — HISTORY OF PRESENT ILLNESS
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  \par \par Patient reports no new medical changes since last visit 10/21/2020. He c/o fatigue and feeling weak (not new). He reports his legs are unsteady but he is able to manage to walk short distances with he walker.  He fell two days after [place] and sustained some skin abrasions to his BLE.  His wife reports that the skin abrasions are slowly improving.  He also reports some new skin lesions on chest and RLE.  He plans to schedule an appt with dermatology.  He has left heel pain secondary to heel spur.  He has follow-up with podiatrist next month.   He bruises easily.  He has chronic barometric headaches and chronic neck pain.  He reports intermittent pain, numbness and swelling in his hands are stable. He denies any fever, recent infections, visual problems, chest pain, shortness of breath, palpitations, abdominal pain, bleeding, jaundice, hematuria, dark urine, melena, or BRBPR. He received a flu vaccine this season.

## 2021-01-18 NOTE — RESULTS/DATA
[FreeTextEntry1] : CBC today\par WBC 2.78\par HGB 12.3 (decreased from 14.2 last visit)\par HCT 39.7\par ,000\par ALC 0.28\par

## 2021-01-18 NOTE — ASSESSMENT
[Palliative Care Plan] : not applicable at this time [FreeTextEntry1] : 85 year old man with aplastic anemia plus a PNH subpopulation. Last marrow suggestive of dyspoietic changes. 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 Dr. Tomas is observing this expectantly. His PNH screen is now normal. Following normalization of his platelets with Promacta, he became markedly thrombocytopenic. Marrow showed decreased megakaryocytes. 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 We opted to treat with rabbit ATG, Sandimmune and prednisone in attempt to regain remission. He appears to be responding well and is now on low dose Sandimmune maintenance. Antifungal/herpes/PCP prophylaxis has been held.\par \par Plan:\par Continue Sandimmune 50 mg bid\par Anticoagulation with ASA for PAF as per Cardiology. Platelet count is adequate.\par CMP, LDH, CSA level, Mg, PO4, Fe/TIBC, ferritin\par Follow-up with dermatology/wound care.  Call if any worsening symptoms. \par Stool guaiac cards given to patient. \par RV in 1 month. \par Discussed with Dr. Yeager. \par

## 2021-01-18 NOTE — REVIEW OF SYSTEMS
[Fatigue] : fatigue [Joint Pain] : joint pain [Difficulty Walking] : difficulty walking [Easy Bruising] : a tendency for easy bruising [Negative] : Allergic/Immunologic [de-identified] : skin wounds BLE

## 2021-01-18 NOTE — PHYSICAL EXAM
[Capable of only limited self care, confined to bed or chair more than 50% of waking hours] : Status 3- Capable of only limited self care, confined to bed or chair more than 50% of waking hours [Normal] : affect appropriate [de-identified] : AICD left anterior chest wall. [de-identified] : No cervical, axillary or inguinal LAD noted [de-identified] : 2+ edema to knee bilaterally. No cords. Venous stasis changes. Weeping edema.  [de-identified] : Senile purpura on arms with brawny changes. Small approx 1cm abrasion to RLE. 3-4 cm abrasion to LLE with granulation tissues, weeping.  No surrounding erythema, no increased warmth

## 2021-01-24 PROBLEM — D50.0 IRON DEFICIENCY ANEMIA DUE TO CHRONIC BLOOD LOSS: Status: ACTIVE | Noted: 2017-05-16

## 2021-01-24 PROBLEM — D72.820 MONOCLONAL B-CELL LYMPHOCYTOSIS OF UNKNOWN SIGNIFICANCE: Status: ACTIVE | Noted: 2018-11-13

## 2021-04-01 NOTE — PHYSICAL EXAM
[Ambulatory and capable of all self care but unable to carry out any work activities] : Status 2- Ambulatory and capable of all self care but unable to carry out any work activities. Up and about more than 50% of waking hours [Normal] : affect appropriate [de-identified] : AICD left anterior chest wall. [de-identified] : 4+ edema to knee bilateraly. No cords. [de-identified] : Senile purpura on arms with brawny changes. Skin wound on L shin area.

## 2021-04-01 NOTE — HISTORY OF PRESENT ILLNESS
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 No recent falls. Skin tears easily. Bruises easily. Chronic barometric headaches persist. Chronic neck pain persists. Intermittent pain, numbness and swelling in his hands stable. No other complaints. He notes no fever, visual problems, chest pain, dyspnea, palpitation, abdominal pain, bruising, bleeding, jaundice, hematuria, dark urine, BRBPR.   Denies any recent hospitalizations.  \par \par Has been maintaining social distancing during COVID pandemic.  Lives at home with wife.  Skin wound on L shin persists, states it is getting better.  Dressing changed daily by wife.

## 2021-04-01 NOTE — ASSESSMENT
[Palliative Care Plan] : not applicable at this time [FreeTextEntry1] : 84 year old male with aplastic anemia plus a PNH subpopulation. Last marrow suggestive of dyspoietic changes. 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 Dr. Tomas is observing this expectantly. His PNH screen is now normal. Following normalization of his platelets with Promacta, he became markedly thrombocytopenic. Marrow showed decreased megakaryocytes. 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 Pt had taken rabbit ATG, Sandimmune and prednisone in attempt to regain remission. He responded well and is now on low dose Sandimmune maintenance.  Off antifungal/herpes/PCP prophylaxis.  L mackay wound-wife changes dressing daily; being followed by wound care.\par \par Plan:\par Sandimmune 50 mg bid\par Anticoagulation with ASA for PAF as per Cardiology. Platelet count 109 today.\par CMP, LDH, CSA level, Mg, PO4\par Repeat labs in 4 weeks\par PNH, PI linked antigen done today.\par RTC in 8 weeks. \par

## 2021-04-13 ENCOUNTER — APPOINTMENT (OUTPATIENT)
Dept: HEMATOLOGY ONCOLOGY | Facility: CLINIC | Age: 86
End: 2021-04-13

## 2023-11-11 NOTE — REASON FOR VISIT
Report was  verbally given at bedside. . [Follow-Up Visit] : a follow-up visit for [Blood Count Assessment] : blood count assessment

## 2023-11-27 NOTE — DISCHARGE NOTE ADULT - CARE PROVIDER_API CALL
PAST MEDICAL HISTORY:  Afib     CAD in native artery     CHF (congestive heart failure)     Essential hypertension, hypertension with unspecified goal     Former smoker     Hematoma of leg     Hyperlipidemia, unspecified hyperlipidemia type     Stented coronary artery     Type 2 diabetes mellitus      Issac Felix), ColonRectal Surgery; Surgery  [address] [facility]  [address]  [place] [address]  Phone: [phone]  Fax: [phone]

## 2024-03-12 NOTE — PROVIDER CONTACT NOTE (OTHER) - REASON
Writer spoke with daughter Shira, and made a follow up appointment for her mother, Concepcion. Follow up scheduled for 3/26/24 at 10:30 am. All questions and concerns answered.  
discrepancy found in PCA dilaudid pump, MD made aware to take down PCA pump
pt HR elevated
pt has been belching
Pt failed DTV at 1600

## 2024-11-26 NOTE — REASON FOR VISIT
Please continue Tylenol and ibuprofen at home for pain.  Please use the prescription medications as needed if they are helpful.    Your lab testing including urine are reassuring today.  You may have a viral illness that is causing you some back pain.  If this is the case I expect it to improve over the next few days.  If you have a back injury from an unknown aggravation the prescription medications may be helpful.  Please follow-up with your PCP if still having symptoms after 1 to 2 weeks.   [Follow-Up Visit] : a follow-up visit for [Blood Count Assessment] : blood count assessment
